# Patient Record
Sex: FEMALE | Race: WHITE | NOT HISPANIC OR LATINO | Employment: UNEMPLOYED | URBAN - METROPOLITAN AREA
[De-identification: names, ages, dates, MRNs, and addresses within clinical notes are randomized per-mention and may not be internally consistent; named-entity substitution may affect disease eponyms.]

---

## 2019-08-23 LAB
HPV ABSTRACT: NORMAL
PAP-ABSTRACT: ABNORMAL

## 2021-03-01 LAB
CHOLESTEROL (EXTERNAL): 238 MG/DL
HDLC SERPL-MCNC: 83 MG/DL
HEP C HIM: NORMAL
HIV 1&2 EXT: NORMAL
LDL CHOLESTEROL CALCULATED (EXTERNAL): 138 MG/DL
NON HDL CHOLESTEROL (EXTERNAL): 155 MG/DL
TRIGLYCERIDES (EXTERNAL): 87 MG/DL

## 2022-06-06 LAB — INR (EXTERNAL): 0.9

## 2022-10-21 ENCOUNTER — TRANSFERRED RECORDS (OUTPATIENT)
Dept: HEALTH INFORMATION MANAGEMENT | Facility: CLINIC | Age: 47
End: 2022-10-21

## 2023-01-31 ENCOUNTER — TRANSFERRED RECORDS (OUTPATIENT)
Dept: HEALTH INFORMATION MANAGEMENT | Facility: CLINIC | Age: 48
End: 2023-01-31

## 2023-02-08 ENCOUNTER — TRANSFERRED RECORDS (OUTPATIENT)
Dept: HEALTH INFORMATION MANAGEMENT | Facility: CLINIC | Age: 48
End: 2023-02-08

## 2023-04-19 ENCOUNTER — TRANSCRIBE ORDERS (OUTPATIENT)
Dept: OTHER | Age: 48
End: 2023-04-19

## 2023-04-19 ENCOUNTER — PATIENT OUTREACH (OUTPATIENT)
Dept: ONCOLOGY | Facility: CLINIC | Age: 48
End: 2023-04-19
Payer: COMMERCIAL

## 2023-04-19 DIAGNOSIS — C54.1 ENDOMETRIAL STROMAL SARCOMA (H): Primary | ICD-10-CM

## 2023-04-19 NOTE — PROGRESS NOTES
"New Patient Hematology / Oncology Nurse Navigator Note     Referral Date: 4/19/23    Referring provider: Self-referred    Referring Clinic/Organization: Self Referred. Currently receiving care at [facility]. Requesting 2nd opinion with Dr. Chowdary      Referred to: GynOnc    Requested provider (if applicable): Dr. 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  No findings of recurrence in the abdomen or pelvis.   2.  A CT chest was performed at the same time which will be reported separately.  IMPRESSION:   1.  No thoracic metastatic disease.   2.  Interval left breast implant rupture.    3/20/23 Office Visit with Oncology LEÓN at  [facility]:  \"Plan will be to see her back in 6 months for exam only. I encouraged her to start walking and doing some lower intensity workouts for her swelling and weight gain, as she was doing cross fit but became nauseous doing cross fit, a may need a little bit lighter intensity work out. I feel the achiness in her right lower abdomen is likely from scar tissue\"-- BOOKMARKED     2/10/20 Path (see report for details)-- BOOKMARKED    Clinical Assessment / Barriers to Care (Per Nurse):  Pt lives in [place], [place]      Records Location: Care Everywhere     Records Needed:   Records from [facility] per protocol    Additional testing needed prior to consult:   N/A    Referral updates and Plan:   OUTGOING CALL to pt:  Introduced my role as nurse navigator with [facility] Hematology/Oncology dept and that we have recd the self-referral to Dr Chowdary.   Pt confirms they are aware of the referral and ready to schedule  Provided contact information if future questions arise.     -Transferred pt to NPS line [phone] to schedule appt per scheduling instructions.    Will review with Dr. Chowdary to confirm appropriate to see pt vs scheduling with sarcoma specialist.     Rosalee " Milton, BSN, RN, PHN, OCN  Hematology/Oncology Nurse Navigator  [facility]  [phone]

## 2023-05-09 NOTE — TELEPHONE ENCOUNTER
Endometrial stromal sarcoma (H) [C54.1]  RECORDS STATUS - ALL OTHER DIAGNOSIS      Action May 9, 2023 3:47 PM TJ   Action Taken Call to PT and she stated that she was originally Dx'd at [facility], had one CT and Consult at [facility] (12/2019) and has had all of her Tx at [facility].  PT stated she has never had chemo or radiation.  PT has no access to a printer, so she is planning to sign an AMOS for [facility] when she comes in on Thursday.  Will plan coordination with check in-IB to Rosalee with this info.    May 11, 2023  The plan for PT to sign AMOS today was unsuccessful.  I call her and she stated that she just bought a printer today and to email her the form.  Her plan is to get this back to me in a couple days after she hooks up the printer.  Form has been emailed to [email or web address]     Records Requested     May 9, 2023 3:50 PM    TJ   Clinic name Comments Request Status Complete/  Received   [facility] All imaging over last 5 years Faxed 5.16.23  2nd Request 5.16.23    [facility] Pathology Case: [id number] Uterus Faxed 5.9.23  Tracking #: [id number]      Imaging Received  May 17, 2023    9:50 AM  TJ   Action: Images from [facility] received and resolved to PACS.  Originally under the name Orquideasanjeev Arevalo     RECORDS RECEIVED FROM: [facility]; [facility]; [facility]   DATE RECEIVED: [facility]   NOTES STATUS DETAILS   OFFICE NOTE from referring provider 4.19.23 [facility] Self-2nd Opinion   OFFICE NOTE from medical oncologist 3.20.23 [facility] Isatu Porras   OFFICE NOTE from other specialist [facility]  7.29.21   [place]:  1.13.20    Noni Stoner, Gyn/Onc      Marcos Toro, Gynecology       OPERATIVE/PROCEDURE REPORT 7.23.21 [facility]/ Colonoscopy   DISCHARGE REPORT from the ER  [facility]  1.1.23; 6.11.22; 6.6.22    MEDICATION LIST [facility]    LABS     PATHOLOGY REPORTS [facility]   12.4.19  Tracking #: [id number]  [facility]:  2.10.20 Re-read, not requested as report in [facility]   Case: [id number] Uterus          Case: JR- Pelvic   ANYTHING RELATED TO  DIAGNOSIS 3.20.23 Most recent labs   IMAGING (NEED IMAGES & REPORT)     CT SCANS [facility]:  3.20.23; 6.11.22; 2.25.22; 3.17.21; 10.2.20; 3.9.20  [facility]:   12.27.19 Report from [place] in [facility] Chest/Abdomen Pelvis       MRI [facility]:  10.21.22   Brain   ULTRASOUND [facility]:  3.24.20 Kidnesy w/Bladder

## 2023-05-10 NOTE — PROGRESS NOTES
Consult Notes on Referred Patient    Date: 2023       Dr. Samantha Anderson MD  No address on file       RE: Orquidea Diggs  : 1975  EVER: 2023    Dear Dr. Referred Self:    I had the pleasure of seeing your patient Orquidea Diggs here at the Gynecologic Cancer Clinic at the [facility] on 2023.  As you know she is a very pleasant 47 year old woman with a recent diagnosis of  ESS.  Given these findings she was subsequently sent to the Gynecologic Cancer Clinic for new patient consultation.     HPI  Ms. Arevalo is a very pleasant 47 y.o. woman with the following oncologic history:    Oncology History   Sarcoma Endometrial Stromal (HCC)   Genetic Testing and Tumor Genotyping   None    Initial Diagnosis   Sarcoma Endometrial Stromal (HCC)    Symptoms began in 2019, right lower quadrant abdominal pain, normal menstrual period. Ultrasound revealed multiple fibroids. Managed with Lupron considered but patient declined.    2019 Surgery and Procedures with OB/GYN  total laparoscopic hysterectomy, bilateral salpingectomy with only right oophorectomy. A presumed fibroid mass wrapped around a ligament. Left ovary appeared normal. Pathology reviewed at [facility] shows low-grade endometrial stromal sarcoma arising in the uterus. [facility] did not see it involving the fallopian tube but such was recorded/reported.    ([facility] Path: 14.5 cm LGESS extending into 1 FT; LVSI +)    2019, CT chest abdomen pelvis shows right and left pelvic masses possibly representing metastatic disease. Chest imaging was not definitive for metastases with some enlarged but not significantly enlarged nodes:  Postoperative changes of hysterectomy, bilateral salpingectomy, and right  oophorectomy.     Within the right adnexal region there is a 3.9 x 3.3 cm hyperenhancing soft  tissue mass concerning for metastatic disease or local recurrence. Expansive  filling defect within the  right internal iliac vein that extends cranially to  the level of the mid right common iliac vein (series 4, image 166; series 605,  image 65). The density is similar to the right adnexal mass, which is concerning  for tumor thrombus. No evidence of DVT in the right external iliac or common  femoral veins.    Within the left adnexa there is a 7.1 x 3.7 cm loculated cystic lesion.     Multiple tiny hypodense hepatic lesions most likely represent benign  hemangiomas/cysts, but are indeterminate for metastases. The gallbladder,  pancreas, adrenal glands, and kidneys are normal. Small splenules. No  lymphadenopathy in the abdomen or pelvis. Normal caliber small and large bowel.  No aggressive osseous lesions.    1/17/2020 Tumor Board   Path review: LGESS arising from the myometrium perhaps extending to uterine cornua but no evidence of the slides submitted that there is any disease on a fallopian tube or ovary  Rad review: Some LGESS may have FDG update on PET; mediastinal node is suspicious   Biopsy of the mediastinal node and if positive, consider resection vs. adjuvant treatment (hormone therapy) after surgery  Return to the OR for resection of residual disease and LSO    2/10/2020 Surgery and Procedures:02/10/2020 EXPLORATORY LAPAROTOMY., LEFT OOPHORECTOMY, RESECTION PELVIC MASS., UPPER VAGINECTOMY, CYSTOSCOPY, INSERTION STENT URETER., Resection of right internal iliac vein with intravenous tumor. Repair of right common iliac vein., Lymphadenectomy     Surgery  Exlap, resection of pelvic disease remaining from original surgery elsewhere. Resection of tumor mass within the right pelvic ileac vein system.   Complete gross resection.   Stage IIB    3/7/2020 - Biological/Targeted/Hormone Therapy   Letrozole 2.5 daily. At first month, nausea is minimal. Hot flashes are ok. Working from home and staying active. 2-3 mi walking 5 days. CrossFit 40 min three times weekly. Baseline bone mineral density is good, no  osteopenia.    3/2023: Letrozole stopped due to side effects     3/20/23: CT CAP    COMPARISON:  Multiple priors, most recently CT 06/11/2022     FINDINGS:     Prior hysterectomy, bilateral salpingo-oophorectomy, right iliac vein resection, and partial   vaginectomy for endometrial stromal sarcoma. Postsurgical changes and surgical clips in the pelvis.     There is tethering of the right distal ureter in the pelvis with mild associated urothelial   thickening and enhancement, for example series 1, image 111. Mild upstream hydroureter without   hydronephrosis. The contralateral ureter is normal. The bladder is within expected limits. No   recurrent soft tissue in the pelvis or along the iliac veins. No new adenopathy or free fluid.     Multiple hypoattenuating lesions in the liver are stable dating back to 01/22/2020 and likely   represent benign cysts. No suspicious hepatic masses. Non-cirrhotic morphology of the liver with   patent portal and hepatic veins.     The gallbladder and biliary tree are normal. No suspicious pancreatic masses. The adrenal glands and   kidneys are normal. Normal spleen with 2 small splenules. The bowel is within expected limits.     Tiny fat-containing umbilical hernia. No aggressive osseous lesions.     A CT chest was performed at the same time which will be reported separately.     IMPRESSION:   1.  No findings of recurrence in the abdomen or pelvis.   2.  A CT chest was performed at the same time which will be reported separately.    CT chest:  FINDINGS:   This examination was performed in conjunction with a CT of the abdomen, which will be reported   separately.     No suspicious lung nodule. No bulky thoracic lymphadenopathy. No suspicious osseous lesion. No   pleural effusion. Interval left breast implant rupture. Right breast implant appears similar since   prior exam.     IMPRESSION:   1.  No thoracic metastatic disease.   2.  Interval left breast implant rupture.        Today:  Feels better off Letrozole, no more joint pain and was swollen in fingers and her feet-completely improved. Came off the Effexor which she was taking for hot flashes. Still having hot flashes now but they were not better on the Effexor. Has hot flashes constantly: sweating or freezing. Has had TSH checked in past but normal. Had a consult with holistic NP-feels terrible mentally and physically. Was on 75 mg Effexor weaned off and was terrible for her. Has inner ear issues. Has a hx of anxiety been managing with ativan prn. Has a primary MD who manages that. Has not been pleased with her care team. Attended survivorship conference here which is why she is here. Was previously on anti-depressant : lexapro but hated it and stopped it. Just met with a mental health coordinator with [employer].Was laid off in Feb. Has adult children at home (2 living at home age 32 and 23) causes her anxiety. Hoping to hear from outpatient clinic today. Has back pain some nausea and vertigo and dizziness. Feels like everything 3 x more difficult than it should be. Had some pain in right groin when bends and pressure. Naproxen gives her heart burn but helped the discomfort. Has diarrhea and constipation. Saw GI doctor at [place]. 1 year ago had blood in urine and had cystoscopy June 2022 and Sept 2022-told UTI, took antibiotics.    Review of Systems:    I have reviewed the pertinent positive findings in the review of symptoms with the patient.    Past Medical History:     IBS: diarrhea and constipation  anxiety    Past Surgical History:  See above    Health Maintenance:  Health Maintenance Due   Topic Date Due     YEARLY PREVENTIVE VISIT  Never done     ADVANCE CARE PLANNING  Never done     MAMMO SCREENING  Never done     HEPATITIS B IMMUNIZATION (1 of 3 - 3-dose series) Never done     COLORECTAL CANCER SCREENING  Never done     HIV SCREENING  Never done     HEPATITIS C SCREENING  Never done     PAP  Never done     LIPID  Never done     COVID-19  "Vaccine (3 - Booster for Pfizer series) 12/22/2021     INFLUENZA VACCINE (1) Never done     DTAP/TDAP/TD IMMUNIZATION (2 - Td or Tdap) 10/31/2022     PHQ-2 (once per calendar year)  Never done          She has had a history of abnormal Pap smears.  ASCUS    Last Mammogram: 2022             Result: normal      She has not had a history of abnormal mammograms.    Last Colonoscopy: 2021              Result: abnormal: polyps repeat in 5 years.     TSH 3.09 10/2020         Current Medications:     has a current medication list which includes the following prescription(s): buspirone, lorazepam, meclizine, melatonin, and ondansetron.       Allergies:     Ciprofloxacin: dizzy       Social History:     Social History     Tobacco Use     Smoking status: Not on file     Smokeless tobacco: Not on file   Substance Use Topics     Alcohol use: Not on file       History   Drug Use Not on file       No tobacco, no drugs, occ ETOH    Family History:     The patient's family history is notable for the following:    No breast, colon or ovarian cancer    Physical Exam:     /85 (BP Location: Right arm, Patient Position: Sitting, Cuff Size: Adult Large)   Pulse 67   Temp 97.9  F (36.6  C) (Oral)   Resp 18   Ht 1.644 m (5' 4.72\")   Wt 80 kg (176 lb 6.4 oz)   SpO2 96%   BMI 29.61 kg/m    Body mass index is 29.61 kg/m .    General Appearance: healthy and alert, appears anxious, sweaty with hot flashes     HEENT:   , no palpable nodules or masses        Cardiovascular: regular rate and rhythm, no gallops, rubs or murmurs     Respiratory: lungs clear, no rales, rhonchi or wheezes, normal diaphragmatic excursion    Musculoskeletal: extremities non tender and without edema    Neurological: normal gait, no gross defects     Psychiatric: appropriate mood and affect                               Hematological: normal cervical, supraclavicular and inguinal lymph nodes     Gastrointestinal:       abdomen soft, non-tender, non-distended, " no organomegaly or masses, vertical midline incision well healed. Palpation of right lower quadrant can reproduce pain with palpation to medial aspect of Ant superior iliac spine.    Genitourinary: External genitalia and urethral meatus appears normal.  Vagina is smooth without nodularity or masses.  Cervix absent.  Bimanual exam reveal no masses, nodularity or fullness.         Assessment:    Orquidea Diggs is a 47 year old woman with a diagnosis of ESS with history of letrozole x 3 years. Recently stopped in 3/2023 due to menopausal side effects       Plan:     1.)    ESS: Recently stopped letrozole. Anxiety issues and hot flashes, discussed celexa or restarting Effexor. Patient would benefit from NP survivorship visit and Dr. Dobson consult. Based on imaging [facility]. I reviewed these today with the patient.  -Recommend q 6 mos visits and yearly CTCAP  -Right groin discomfort: encouraged 600 mg ibuprofen x 3 days with foot to see if improvement. Suspect muscle strain discomfort. Encourage PT for pain management if persists.  -NP for survivorship visit   -Primary care MD to set up with   -DXA scan for bone density  -Mental health: encouraged patient to follow up with local [facility] health clinic for counseling and possibly for initiation of anti-depressants. Patient admits she has done this and will follow up with them.   -RTC in 6 months with me.     2.) Genetic risk factors were assessed and the patient does not meet the qualifications for a referral.      3.) Labs and/or tests ordered include:  None today     4.) Health maintenance issues addressed today include colonoscopy        Thank you for allowing us to participate in the care of your patient.         Sincerely,    Sheba Chowdary MD    Department of Ob/Gyn and Women's Health  Division of Gynecologic Oncology  [facility]  [phone]      CC  Patient Care Team:  Katlin Garcia MD as PCP - General Chowdary,  Sheba Laird MD as MD (Gynecologic Oncology)  SELF, REFERRED

## 2023-05-11 ENCOUNTER — PRE VISIT (OUTPATIENT)
Dept: ONCOLOGY | Facility: CLINIC | Age: 48
End: 2023-05-11
Payer: COMMERCIAL

## 2023-05-11 ENCOUNTER — PATIENT OUTREACH (OUTPATIENT)
Dept: ONCOLOGY | Facility: CLINIC | Age: 48
End: 2023-05-11
Payer: COMMERCIAL

## 2023-05-11 ENCOUNTER — ONCOLOGY VISIT (OUTPATIENT)
Dept: ONCOLOGY | Facility: CLINIC | Age: 48
End: 2023-05-11
Attending: OBSTETRICS & GYNECOLOGY
Payer: COMMERCIAL

## 2023-05-11 VITALS
WEIGHT: 176.4 LBS | HEIGHT: 65 IN | DIASTOLIC BLOOD PRESSURE: 85 MMHG | SYSTOLIC BLOOD PRESSURE: 125 MMHG | HEART RATE: 67 BPM | OXYGEN SATURATION: 96 % | TEMPERATURE: 97.9 F | BODY MASS INDEX: 29.39 KG/M2 | RESPIRATION RATE: 18 BRPM

## 2023-05-11 DIAGNOSIS — C54.1 ENDOMETRIAL CANCER (H): Primary | ICD-10-CM

## 2023-05-11 DIAGNOSIS — M89.8X9 CANCER TREATMENT-INDUCED BONE LOSS: ICD-10-CM

## 2023-05-11 DIAGNOSIS — Z76.89 ENCOUNTER TO ESTABLISH CARE: ICD-10-CM

## 2023-05-11 DIAGNOSIS — T45.1X5A CANCER TREATMENT-INDUCED BONE LOSS: ICD-10-CM

## 2023-05-11 PROCEDURE — G0463 HOSPITAL OUTPT CLINIC VISIT: HCPCS | Performed by: OBSTETRICS & GYNECOLOGY

## 2023-05-11 PROCEDURE — 99203 OFFICE O/P NEW LOW 30 MIN: CPT | Performed by: OBSTETRICS & GYNECOLOGY

## 2023-05-11 RX ORDER — ONDANSETRON 4 MG/1
4 TABLET, FILM COATED ORAL EVERY 8 HOURS PRN
COMMUNITY
Start: 2023-05-09 | End: 2024-02-19

## 2023-05-11 RX ORDER — LORAZEPAM 0.5 MG/1
0.5 TABLET ORAL PRN
COMMUNITY
Start: 2022-09-20

## 2023-05-11 RX ORDER — BUSPIRONE HYDROCHLORIDE 7.5 MG/1
7.5 TABLET ORAL
COMMUNITY
Start: 2023-05-09 | End: 2023-10-12

## 2023-05-11 RX ORDER — PHENOL 1.4 %
10 AEROSOL, SPRAY (ML) MUCOUS MEMBRANE
COMMUNITY
End: 2023-10-12

## 2023-05-11 RX ORDER — MECLIZINE HYDROCHLORIDE 25 MG/1
25 TABLET ORAL
COMMUNITY
Start: 2023-01-01 | End: 2023-10-12

## 2023-05-11 ASSESSMENT — PAIN SCALES - GENERAL: PAINLEVEL: NO PAIN (0)

## 2023-05-11 NOTE — PROGRESS NOTES
received referral for Dr Dobson in the PM&R clinic.     Referred for: hx endometrial cancer deconditioned    Scheduling instructions updated and sent to New Patient Scheduling for completion.

## 2023-05-11 NOTE — LETTER
2023         RE: Orquidea Diggs  [address] [address]        Dear Colleague,    Thank you for referring your patient, Orquidea Diggs, to the [facility]. Please see a copy of my visit note below.                            Consult Notes on Referred Patient    Date: 2023       Dr. Samantha Anderson MD  No address on file       RE: Orquidea Diggs  : 1975  EVER: 2023    Dear Dr. Referred Self:    I had the pleasure of seeing your patient Orquidea Diggs here at the Gynecologic Cancer Clinic at the [facility] on 2023.  As you know she is a very pleasant 47 year old woman with a recent diagnosis of  ESS.  Given these findings she was subsequently sent to the Gynecologic Cancer Clinic for new patient consultation.     HPI  Ms. Arevalo is a very pleasant 47 y.o. woman with the following oncologic history:    Oncology History   Sarcoma Endometrial Stromal (HCC)   Genetic Testing and Tumor Genotyping   None    Initial Diagnosis   Sarcoma Endometrial Stromal (HCC)    Symptoms began in 2019, right lower quadrant abdominal pain, normal menstrual period. Ultrasound revealed multiple fibroids. Managed with Lupron considered but patient declined.    2019 Surgery and Procedures with OB/GYN  total laparoscopic hysterectomy, bilateral salpingectomy with only right oophorectomy. A presumed fibroid mass wrapped around a ligament. Left ovary appeared normal. 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 [facility] did not see it involving the fallopian tube but such was recorded/reported.    ([facility]: 14.5 cm LGESS extending into 1 FT; LVSI +)    2019, CT chest abdomen pelvis shows right and left pelvic masses possibly representing metastatic disease. Chest imaging was not definitive for metastases with some enlarged but not significantly enlarged  nodes:  Postoperative changes of hysterectomy, bilateral salpingectomy, and right  oophorectomy.     Within the right adnexal region there is a 3.9 x 3.3 cm hyperenhancing soft  tissue mass concerning for metastatic disease or local recurrence. Expansive  filling defect within the right internal iliac vein that extends cranially to  the level of the mid right common iliac vein (series 4, image 166; series 605,  image 65). The density is similar to the right adnexal mass, which is concerning  for tumor thrombus. No evidence of DVT in the right external iliac or common  femoral veins.    Within the left adnexa there is a 7.1 x 3.7 cm loculated cystic lesion.     Multiple tiny hypodense hepatic lesions most likely represent benign  hemangiomas/cysts, but are indeterminate for metastases. The gallbladder,  pancreas, adrenal glands, and kidneys are normal. Small splenules. No  lymphadenopathy in the abdomen or pelvis. Normal caliber small and large bowel.  No aggressive osseous lesions.    1/17/2020 Tumor Board   Path review: LGESS arising from the myometrium perhaps extending to uterine cornua but no evidence of the slides submitted that there is any disease on a fallopian tube or ovary  Rad review: Some LGESS may have FDG update on PET; mediastinal node is suspicious   Biopsy of the mediastinal node and if positive, consider resection vs. adjuvant treatment (hormone therapy) after surgery  Return to the OR for resection of residual disease and LSO    2/10/2020 Surgery and Procedures:02/10/2020 EXPLORATORY LAPAROTOMY., LEFT OOPHORECTOMY, RESECTION PELVIC MASS., UPPER VAGINECTOMY, CYSTOSCOPY, INSERTION STENT URETER., Resection of right internal iliac vein with intravenous tumor. Repair of right common iliac vein., Lymphadenectomy     Surgery  Exlap, resection of pelvic disease remaining from original surgery elsewhere. Resection of tumor mass within the right pelvic ileac vein system.   Complete gross resection.   Stage  IIB    3/7/2020 - Biological/Targeted/Hormone Therapy   Letrozole 2.5 daily. At first month, nausea is minimal. Hot flashes are ok. Working from home and staying active. 2-3 mi walking 5 days. CrossFit 40 min three times weekly. Baseline bone mineral density is good, no osteopenia.    3/2023: Letrozole stopped due to side effects     3/20/23: CT CAP    COMPARISON:  Multiple priors, most recently CT 06/11/2022     FINDINGS:     Prior hysterectomy, bilateral salpingo-oophorectomy, right iliac vein resection, and partial   vaginectomy for endometrial stromal sarcoma. Postsurgical changes and surgical clips in the pelvis.     There is tethering of the right distal ureter in the pelvis with mild associated urothelial   thickening and enhancement, for example series 1, image 111. Mild upstream hydroureter without   hydronephrosis. The contralateral ureter is normal. The bladder is within expected limits. No   recurrent soft tissue in the pelvis or along the iliac veins. No new adenopathy or free fluid.     Multiple hypoattenuating lesions in the liver are stable dating back to 01/22/2020 and likely   represent benign cysts. No suspicious hepatic masses. Non-cirrhotic morphology of the liver with   patent portal and hepatic veins.     The gallbladder and biliary tree are normal. No suspicious pancreatic masses. The adrenal glands and   kidneys are normal. Normal spleen with 2 small splenules. The bowel is within expected limits.     Tiny fat-containing umbilical hernia. No aggressive osseous lesions.     A CT chest was performed at the same time which will be reported separately.     IMPRESSION:   1.  No findings of recurrence in the abdomen or pelvis.   2.  A CT chest was performed at the same time which will be reported separately.    CT chest:  FINDINGS:   This examination was performed in conjunction with a CT of the abdomen, which will be reported   separately.     No suspicious lung nodule. No bulky thoracic  lymphadenopathy. No suspicious osseous lesion. No   pleural effusion. Interval left breast implant rupture. Right breast implant appears similar since   prior exam.     IMPRESSION:   1.  No thoracic metastatic disease.   2.  Interval left breast implant rupture.        Today: Feels better off Letrozole, no more joint pain and was swollen in fingers and her feet-completely improved. Came off the Effexor which she was taking for hot flashes. Still having hot flashes now but they were not better on the Effexor. Has hot flashes constantly: sweating or freezing. Has had TSH checked in past but normal. Had a consult with holistic NP-feels terrible mentally and physically. Was on 75 mg Effexor weaned off and was terrible for her. Has inner ear issues. Has a hx of anxiety been managing with ativan prn. Has a primary MD who manages that. Has not been pleased with her care team. Attended survivorship conference here which is why she is here. Was previously on anti-depressant : lexapro but hated it and stopped it. Just met with a mental health coordinator with [employer].Was laid off in Feb. Has adult children at home (2 living at home age 32 and 23) causes her anxiety. Hoping to hear from outpatient clinic today. Has back pain some nausea and vertigo and dizziness. Feels like everything 3 x more difficult than it should be. Had some pain in right groin when bends and pressure. Naproxen gives her heart burn but helped the discomfort. Has diarrhea and constipation. Saw GI doctor at [facility]. 1 year ago had blood in urine and had cystoscopy June 2022 and Sept 2022-told UTI, took antibiotics.    Review of Systems:    I have reviewed the pertinent positive findings in the review of symptoms with the patient.    Past Medical History:     IBS: diarrhea and constipation  anxiety    Past Surgical History:  See above    Health Maintenance:  Health Maintenance Due   Topic Date Due    YEARLY PREVENTIVE VISIT  Never done    ADVANCE CARE PLANNING   "Never done    MAMMO SCREENING  Never done    HEPATITIS B IMMUNIZATION (1 of 3 - 3-dose series) Never done    COLORECTAL CANCER SCREENING  Never done    HIV SCREENING  Never done    HEPATITIS C SCREENING  Never done    PAP  Never done    LIPID  Never done    COVID-19 Vaccine (3 - Booster for Pfizer series) 12/22/2021    INFLUENZA VACCINE (1) Never done    DTAP/TDAP/TD IMMUNIZATION (2 - Td or Tdap) 10/31/2022    PHQ-2 (once per calendar year)  Never done          She has had a history of abnormal Pap smears.  ASCUS    Last Mammogram: 2022             Result: normal      She has not had a history of abnormal mammograms.    Last Colonoscopy: 2021              Result: abnormal: polyps repeat in 5 years.     TSH 3.09 10/2020         Current Medications:     has a current medication list which includes the following prescription(s): buspirone, lorazepam, meclizine, melatonin, and ondansetron.       Allergies:     Ciprofloxacin: dizzy       Social History:     Social History     Tobacco Use    Smoking status: Not on file    Smokeless tobacco: Not on file   Substance Use Topics    Alcohol use: Not on file       History   Drug Use Not on file       No tobacco, no drugs, occ ETOH    Family History:     The patient's family history is notable for the following:    No breast, colon or ovarian cancer    Physical Exam:     /85 (BP Location: Right arm, Patient Position: Sitting, Cuff Size: Adult Large)   Pulse 67   Temp 97.9  F (36.6  C) (Oral)   Resp 18   Ht 1.644 m (5' 4.72\")   Wt 80 kg (176 lb 6.4 oz)   SpO2 96%   BMI 29.61 kg/m    Body mass index is 29.61 kg/m .    General Appearance: healthy and alert, appears anxious, sweaty with hot flashes     HEENT:   , no palpable nodules or masses        Cardiovascular: regular rate and rhythm, no gallops, rubs or murmurs     Respiratory: lungs clear, no rales, rhonchi or wheezes, normal diaphragmatic excursion    Musculoskeletal: extremities non tender and without " edema    Neurological: normal gait, no gross defects     Psychiatric: appropriate mood and affect                               Hematological: normal cervical, supraclavicular and inguinal lymph nodes     Gastrointestinal:       abdomen soft, non-tender, non-distended, no organomegaly or masses, vertical midline incision well healed. Palpation of right lower quadrant can reproduce pain with palpation to medial aspect of Ant superior iliac spine.    Genitourinary: External genitalia and urethral meatus appears normal.  Vagina is smooth without nodularity or masses.  Cervix absent.  Bimanual exam reveal no masses, nodularity or fullness.         Assessment:    Orquidea Diggs is a 47 year old woman with a diagnosis of ESS with history of letrozole x 3 years. Recently stopped in 3/2023 due to menopausal side effects       Plan:     1.)    ESS: Recently stopped letrozole. Anxiety issues and hot flashes, discussed celexa or restarting Effexor. Patient would benefit from NP survivorship visit and Dr. Dobson consult. Based on imaging JOSR. I reviewed these today with the patient.  -Recommend q 6 mos visits and yearly CTCAP  -Right groin discomfort: encouraged 600 mg ibuprofen x 3 days with foot to see if improvement. Suspect muscle strain discomfort. Encourage PT for pain management if persists.  -NP for survivorship visit   -Primary care MD to set up with   -DXA scan for bone density  -Mental health: encouraged patient to follow up with local [facility] mental health clinic for counseling and possibly for initiation of anti-depressants. Patient admits she has done this and will follow up with them.   -RTC in 6 months with me.     2.) Genetic risk factors were assessed and the patient does not meet the qualifications for a referral.      3.) Labs and/or tests ordered include:  None today     4.) Health maintenance issues addressed today include colonoscopy        Thank you for allowing us to participate in the care of your  patient.         Sincerely,    Sheba Chowdary MD    Department of Ob/Gyn and Women's Health  Division of Gynecologic Oncology  [facility]  [phone]      CC  Patient Care Team:  Katlin Garcia MD as PCP - Sheba Hutchinson MD as MD (Gynecologic Oncology)  SELF, REFERRED

## 2023-05-11 NOTE — TELEPHONE ENCOUNTER
Slides from [facility]/ [facility] received 5/11/23 and were sent to 5th floor path lab.

## 2023-05-11 NOTE — NURSING NOTE
"Oncology Rooming Note    May 11, 2023 8:20 AM   Orquidea Diggs is a 47 year old female who presents for:    Chief Complaint   Patient presents with     Oncology Clinic Visit     NEW - ENDOMETRIAL STROMAL SARCOMA     Initial Vitals: /85 (BP Location: Right arm, Patient Position: Sitting, Cuff Size: Adult Large)   Pulse 67   Temp 97.9  F (36.6  C) (Oral)   Resp 18   Ht 1.644 m (5' 4.72\")   Wt 80 kg (176 lb 6.4 oz)   SpO2 96%   BMI 29.61 kg/m   Estimated body mass index is 29.61 kg/m  as calculated from the following:    Height as of this encounter: 1.644 m (5' 4.72\").    Weight as of this encounter: 80 kg (176 lb 6.4 oz). Body surface area is 1.91 meters squared.  No Pain (0) Comment: Data Unavailable   No LMP recorded. Patient has had a hysterectomy.  Allergies reviewed: Yes  Medications reviewed: Yes    Medications: Medication refills not needed today.  Pharmacy name entered into "[employer]: [id number] - [place], [place] - [address]    Clinical concerns: New patient.        Suzan Hanks CMA              "

## 2023-05-12 ENCOUNTER — LAB REQUISITION (OUTPATIENT)
Dept: LAB | Facility: CLINIC | Age: 48
End: 2023-05-12
Payer: COMMERCIAL

## 2023-05-12 PROCEDURE — 88321 CONSLTJ&REPRT SLD PREP ELSWR: CPT | Mod: GC | Performed by: PATHOLOGY

## 2023-05-12 NOTE — NURSING NOTE
Return appt in  6 months  Radiology for dexa scan now, CT scan in 1 year,  orders entered, to be scheduled  Referral to cancer rehab, primary care, surviviorship

## 2023-05-12 NOTE — PATIENT INSTRUCTIONS
It was a pleasure seeing you in clinic today-please reach out if there are any further questions that arise following today's visit.  During business hours, you may reach me at [phone].  For urgent/emergent questions after business hours, you may reach the on-call Gynecologic Oncology Resident through the [facility]  at [phone].    All normal test results are usually communicated via letter or [employer] message.  Abnormal results (those that require a change in the previously discussed plan of care) are usually communicated via a phone call.    I recommend signing up for [employer] access if you have not already done so.  This allows you online access to your lab results and also helps you communicate efficiently with your clinic should any questions arise in your care.    Follow up appointment in 6 months with MD scheduling will call you to help make an appointment  referral to primary care, radiology for dexa scan cancer rehab, surviviorship with NP, scheduling will call you to help make an appointment    Dr Epi Goldberg RN  Phone:  [phone]  Fax:  [phone]

## 2023-05-23 ENCOUNTER — TRANSFERRED RECORDS (OUTPATIENT)
Dept: MULTI SPECIALTY CLINIC | Facility: CLINIC | Age: 48
End: 2023-05-23
Payer: COMMERCIAL

## 2023-05-23 LAB
ALT SERPL-CCNC: 64 IU/L (ref 8–45)
AST SERPL-CCNC: 46 IU/L (ref 2–40)
CREATININE (EXTERNAL): 1.04 MG/DL (ref 0.57–1.11)
GFR ESTIMATED (EXTERNAL): 66 ML/MIN/1.73M2
GLUCOSE (EXTERNAL): 87 MG/DL (ref 70–99)
POTASSIUM (EXTERNAL): 4.2 MMOL/L (ref 3.5–5)
TSH SERPL-ACNC: 1.7 UIU/ML (ref 0.35–4.94)

## 2023-05-30 LAB
PATH REPORT.COMMENTS IMP SPEC: ABNORMAL
PATH REPORT.COMMENTS IMP SPEC: YES
PATH REPORT.FINAL DX SPEC: ABNORMAL
PATH REPORT.GROSS SPEC: ABNORMAL
PATH REPORT.MICROSCOPIC SPEC OTHER STN: ABNORMAL
PATH REPORT.MICROSCOPIC SPEC OTHER STN: ABNORMAL
PATH REPORT.RELEVANT HX SPEC: ABNORMAL
PATH REPORT.RELEVANT HX SPEC: ABNORMAL
PATH REPORT.SITE OF ORIGIN SPEC: ABNORMAL

## 2023-05-31 PROBLEM — C54.1 ENDOMETRIAL STROMAL SARCOMA (H): Status: ACTIVE | Noted: 2020-01-13

## 2023-05-31 PROBLEM — K92.1 BLOOD IN STOOL: Status: ACTIVE | Noted: 2021-03-17

## 2023-05-31 PROBLEM — I82.409 DEEP VEIN THROMBOSIS (DVT) OF LOWER EXTREMITY (H): Status: ACTIVE | Noted: 2020-01-24

## 2023-05-31 PROBLEM — N13.30 HYDRONEPHROSIS: Status: ACTIVE | Noted: 2020-03-26

## 2023-05-31 PROBLEM — R59.1 LYMPHADENOPATHY: Status: ACTIVE | Noted: 2020-01-20

## 2023-05-31 PROBLEM — R87.610 ATYPICAL SQUAMOUS CELLS OF UNDETERMINED SIGNIFICANCE (ASCUS) ON PAPANICOLAOU SMEAR OF CERVIX: Status: ACTIVE | Noted: 2019-10-23

## 2023-06-02 ENCOUNTER — HEALTH MAINTENANCE LETTER (OUTPATIENT)
Age: 48
End: 2023-06-02

## 2023-06-14 ENCOUNTER — TRANSFERRED RECORDS (OUTPATIENT)
Dept: HEALTH INFORMATION MANAGEMENT | Facility: CLINIC | Age: 48
End: 2023-06-14

## 2023-09-22 ASSESSMENT — ANXIETY QUESTIONNAIRES
5. BEING SO RESTLESS THAT IT IS HARD TO SIT STILL: NOT AT ALL
1. FEELING NERVOUS, ANXIOUS, OR ON EDGE: SEVERAL DAYS
GAD7 TOTAL SCORE: 6
7. FEELING AFRAID AS IF SOMETHING AWFUL MIGHT HAPPEN: SEVERAL DAYS
2. NOT BEING ABLE TO STOP OR CONTROL WORRYING: SEVERAL DAYS
6. BECOMING EASILY ANNOYED OR IRRITABLE: SEVERAL DAYS
3. WORRYING TOO MUCH ABOUT DIFFERENT THINGS: SEVERAL DAYS
IF YOU CHECKED OFF ANY PROBLEMS ON THIS QUESTIONNAIRE, HOW DIFFICULT HAVE THESE PROBLEMS MADE IT FOR YOU TO DO YOUR WORK, TAKE CARE OF THINGS AT HOME, OR GET ALONG WITH OTHER PEOPLE: SOMEWHAT DIFFICULT
4. TROUBLE RELAXING: SEVERAL DAYS

## 2023-09-22 ASSESSMENT — PATIENT HEALTH QUESTIONNAIRE - PHQ9
SUM OF ALL RESPONSES TO PHQ QUESTIONS 1-9: 13
10. IF YOU CHECKED OFF ANY PROBLEMS, HOW DIFFICULT HAVE THESE PROBLEMS MADE IT FOR YOU TO DO YOUR WORK, TAKE CARE OF THINGS AT HOME, OR GET ALONG WITH OTHER PEOPLE: SOMEWHAT DIFFICULT
SUM OF ALL RESPONSES TO PHQ QUESTIONS 1-9: 13

## 2023-10-12 ENCOUNTER — OFFICE VISIT (OUTPATIENT)
Dept: FAMILY MEDICINE | Facility: CLINIC | Age: 48
End: 2023-10-12
Attending: OBSTETRICS & GYNECOLOGY
Payer: COMMERCIAL

## 2023-10-12 VITALS
RESPIRATION RATE: 20 BRPM | HEART RATE: 58 BPM | DIASTOLIC BLOOD PRESSURE: 76 MMHG | BODY MASS INDEX: 29.99 KG/M2 | HEIGHT: 65 IN | SYSTOLIC BLOOD PRESSURE: 118 MMHG | WEIGHT: 180 LBS | TEMPERATURE: 98 F | OXYGEN SATURATION: 96 %

## 2023-10-12 DIAGNOSIS — G89.29 CHRONIC BILATERAL LOW BACK PAIN WITHOUT SCIATICA: ICD-10-CM

## 2023-10-12 DIAGNOSIS — H92.03 OTALGIA OF BOTH EARS: ICD-10-CM

## 2023-10-12 DIAGNOSIS — M54.50 CHRONIC BILATERAL LOW BACK PAIN WITHOUT SCIATICA: ICD-10-CM

## 2023-10-12 DIAGNOSIS — Z76.89 ENCOUNTER TO ESTABLISH CARE: ICD-10-CM

## 2023-10-12 DIAGNOSIS — R42 VERTIGO: ICD-10-CM

## 2023-10-12 DIAGNOSIS — M25.50 MULTIPLE JOINT PAIN: Primary | ICD-10-CM

## 2023-10-12 DIAGNOSIS — R39.81 FUNCTIONAL URINARY INCONTINENCE: ICD-10-CM

## 2023-10-12 PROBLEM — K92.1 BLOOD IN STOOL: Status: RESOLVED | Noted: 2021-03-17 | Resolved: 2023-10-12

## 2023-10-12 LAB
ALBUMIN SERPL BCG-MCNC: 4.6 G/DL (ref 3.5–5.2)
ALP SERPL-CCNC: 89 U/L (ref 35–104)
ALT SERPL W P-5'-P-CCNC: 65 U/L (ref 0–50)
ANION GAP SERPL CALCULATED.3IONS-SCNC: 13 MMOL/L (ref 7–15)
AST SERPL W P-5'-P-CCNC: 50 U/L (ref 0–45)
BILIRUB SERPL-MCNC: 0.5 MG/DL
BUN SERPL-MCNC: 14.6 MG/DL (ref 6–20)
CALCIUM SERPL-MCNC: 9.8 MG/DL (ref 8.6–10)
CHLORIDE SERPL-SCNC: 102 MMOL/L (ref 98–107)
CREAT SERPL-MCNC: 1 MG/DL (ref 0.51–0.95)
CRP SERPL-MCNC: <3 MG/L
DEPRECATED HCO3 PLAS-SCNC: 26 MMOL/L (ref 22–29)
EGFRCR SERPLBLD CKD-EPI 2021: 69 ML/MIN/1.73M2
ERYTHROCYTE [SEDIMENTATION RATE] IN BLOOD BY WESTERGREN METHOD: 8 MM/HR (ref 0–20)
GLUCOSE SERPL-MCNC: 88 MG/DL (ref 70–99)
POTASSIUM SERPL-SCNC: 4.8 MMOL/L (ref 3.4–5.3)
PROT SERPL-MCNC: 7.6 G/DL (ref 6.4–8.3)
SODIUM SERPL-SCNC: 141 MMOL/L (ref 135–145)
VIT D+METAB SERPL-MCNC: 41 NG/ML (ref 20–50)

## 2023-10-12 PROCEDURE — 86200 CCP ANTIBODY: CPT | Performed by: FAMILY MEDICINE

## 2023-10-12 PROCEDURE — 80053 COMPREHEN METABOLIC PANEL: CPT | Performed by: FAMILY MEDICINE

## 2023-10-12 PROCEDURE — 99204 OFFICE O/P NEW MOD 45 MIN: CPT | Mod: 25 | Performed by: FAMILY MEDICINE

## 2023-10-12 PROCEDURE — 85652 RBC SED RATE AUTOMATED: CPT | Performed by: FAMILY MEDICINE

## 2023-10-12 PROCEDURE — 86140 C-REACTIVE PROTEIN: CPT | Performed by: FAMILY MEDICINE

## 2023-10-12 PROCEDURE — 90471 IMMUNIZATION ADMIN: CPT | Performed by: FAMILY MEDICINE

## 2023-10-12 PROCEDURE — 36415 COLL VENOUS BLD VENIPUNCTURE: CPT | Performed by: FAMILY MEDICINE

## 2023-10-12 PROCEDURE — 82306 VITAMIN D 25 HYDROXY: CPT | Performed by: FAMILY MEDICINE

## 2023-10-12 PROCEDURE — 86431 RHEUMATOID FACTOR QUANT: CPT | Performed by: FAMILY MEDICINE

## 2023-10-12 PROCEDURE — 90715 TDAP VACCINE 7 YRS/> IM: CPT | Performed by: FAMILY MEDICINE

## 2023-10-12 RX ORDER — MECLIZINE HYDROCHLORIDE 25 MG/1
25 TABLET ORAL 3 TIMES DAILY PRN
Qty: 20 TABLET | Refills: 1 | Status: SHIPPED | OUTPATIENT
Start: 2023-10-12 | End: 2024-02-19

## 2023-10-12 RX ORDER — BUPROPION HYDROCHLORIDE 150 MG/1
150 TABLET ORAL EVERY MORNING
COMMUNITY
Start: 2023-10-03 | End: 2024-04-25

## 2023-10-12 RX ORDER — CYCLOBENZAPRINE HCL 10 MG
10 TABLET ORAL
Qty: 30 TABLET | Refills: 3 | Status: SHIPPED | OUTPATIENT
Start: 2023-10-12 | End: 2024-02-19

## 2023-10-12 ASSESSMENT — ANXIETY QUESTIONNAIRES
5. BEING SO RESTLESS THAT IT IS HARD TO SIT STILL: NOT AT ALL
1. FEELING NERVOUS, ANXIOUS, OR ON EDGE: MORE THAN HALF THE DAYS
6. BECOMING EASILY ANNOYED OR IRRITABLE: SEVERAL DAYS
IF YOU CHECKED OFF ANY PROBLEMS ON THIS QUESTIONNAIRE, HOW DIFFICULT HAVE THESE PROBLEMS MADE IT FOR YOU TO DO YOUR WORK, TAKE CARE OF THINGS AT HOME, OR GET ALONG WITH OTHER PEOPLE: VERY DIFFICULT
4. TROUBLE RELAXING: SEVERAL DAYS
GAD7 TOTAL SCORE: 9
GAD7 TOTAL SCORE: 9
2. NOT BEING ABLE TO STOP OR CONTROL WORRYING: MORE THAN HALF THE DAYS
7. FEELING AFRAID AS IF SOMETHING AWFUL MIGHT HAPPEN: SEVERAL DAYS
3. WORRYING TOO MUCH ABOUT DIFFERENT THINGS: MORE THAN HALF THE DAYS

## 2023-10-12 NOTE — PROGRESS NOTES
Assessment & Plan     (M25.50) Multiple joint pain  (primary encounter diagnosis)  Comment: pt has multiply joints pain for years: shoulder, hands, hips, knees, ankles. No swelling and erythema. No injury. Exam: generalized joints tenderness. No swelling, erythema. Non smoker. Unknown etiology. Ddx: OA, RA, vit deficiency ?   Plan: Comprehensive metabolic panel (BMP + Alb, Alk         Phos, ALT, AST, Total. Bili, TP), Cyclic         Citrullinated Peptide Antibody IgG, Rheumatoid         factor, ESR: Erythrocyte sedimentation rate,         CRP, inflammation, Vitamin D Deficiency        Will follow-up lab. Advise regular exercise and otc multiply vitamins with minerals.     (Z76.89) Encounter to establish care  Comment: pt switch care with us  Plan: advise to have pe if she is over due.     (H92.03) Otalgia of both ears  Comment: pt has chronic ears pain comes and goes. Right ear is worse. Left ear has ringing. Denies hearing loss. Pt state she had ent consult in the past but no treatment. Exam is not remarkable. Unknown etiology.   Plan: Adult ENT  Referral        Will have ent consult.     (R39.81) Functional urinary incontinence  Comment: pt has urinary incontinence for years and getting worse. She had hysterectomy. No dysuria and blood in urine.   Plan: Adult Urology  Referral        Will have consult with urology for possible surgery    (R42) Vertigo  Comment: pt has vertigo sometimes. No history of seizure, syncope, head injury. Sometimes she has left ear ringing. No hearing loss. Exam is not remarkable. Vertigo vs meniere's disease. She takes meclizine prn with help  Plan: meclizine (ANTIVERT) 25 MG tablet        Will take meclizine prn and follow-up with ent    (M54.50,  G89.29) Chronic bilateral low back pain without sciatica  Comment: pt has chronic low back pain bilateral. No injury and heavy lifting. No shooting pain and numbness and tingling to legs. No fever and weight loss. Good  "bladder and bm control. She takes flexeril prn with help. Exam not remarkable.   Plan: cyclobenzaprine (FLEXERIL) 10 MG tablet        We discussed the pt and she declined. Back rom exercise as tolerate and avoid heavy lifting. Follow-up if pain gets worse          BMI:   Estimated body mass index is 29.95 kg/m  as calculated from the following:    Height as of this encounter: 1.651 m (5' 5\").    Weight as of this encounter: 81.6 kg (180 lb).   Weight management plan: Discussed healthy diet and exercise guidelines    See Patient Instructions    Holly Moreno MD  [facility]    Yvrose Nugent is a 47 year old, presenting for the following health issues:  Establish Care        10/12/2023     7:53 AM   Additional Questions   Roomed by Gen ANSON CMA       History of Present Illness       Reason for visit:  Looking for a new PCP    She eats 0-1 servings of fruits and vegetables daily.She consumes 0 sweetened beverage(s) daily.She exercises with enough effort to increase her heart rate 30 to 60 minutes per day.  She exercises with enough effort to increase her heart rate 4 days per week.   She is taking medications regularly.      Chronic/Recurring Back Pain Follow Up    Where is your back pain located? (Select all that apply) low back bilateral  How would you describe your back pain?  dull ache  Where does your back pain spread? nowhere  Since your last clinic visit for back pain, how has your pain changed? unchanged  Does your back pain interfere with your job? Pt is disabled due to mental disorder.   Since your last visit, have you tried any new treatment? No        Review of Systems   Constitutional, HEENT, cardiovascular, pulmonary, gi and gu systems are negative, except as otherwise noted.      Objective    /76   Pulse 58   Temp 98  F (36.7  C) (Oral)   Resp 20   Ht 1.651 m (5' 5\")   Wt 81.6 kg (180 lb)   LMP  (LMP Unknown)   SpO2 96%   BMI 29.95 kg/m    Body mass index is " 29.95 kg/m .  Physical Exam   GENERAL: healthy, alert and no distress  HENT: ear canals and TM's normal, nose and mouth without ulcers or lesions  NECK: no adenopathy, no asymmetry, masses, or scars and thyroid normal to palpation  RESP: lungs clear to auscultation - no rales, rhonchi or wheezes  CV: regular rate and rhythm, normal S1 S2, no S3 or S4, no murmur, click or rub, no peripheral edema and peripheral pulses strong  ABDOMEN: soft, nontender, no hepatosplenomegaly, no masses and bowel sounds normal  MS: no gross musculoskeletal defects noted, no edema   Cervical, thoracic and lumbar spine exam   with mild  tenderness on low lumber, no masses or kyphoscoliosis. Full range of motion without pain is noted.          Prior to immunization administration, verified patients identity using patient s name and date of birth. Please see Immunization Activity for additional information.     Screening Questionnaire for Adult Immunization    Are you sick today?   No   Do you have allergies to medications, food, a vaccine component or latex?   Yes   Have you ever had a serious reaction after receiving a vaccination?   No   Do you have a long-term health problem with heart, lung, kidney, or metabolic disease (e.g., diabetes), asthma, a blood disorder, no spleen, complement component deficiency, a cochlear implant, or a spinal fluid leak?  Are you on long-term aspirin therapy?   No   Do you have cancer, leukemia, HIV/AIDS, or any other immune system problem?   No   Do you have a parent, brother, or sister with an immune system problem?   No   In the past 3 months, have you taken medications that affect  your immune system, such as prednisone, other steroids, or anticancer drugs; drugs for the treatment of rheumatoid arthritis, Crohn s disease, or psoriasis; or have you had radiation treatments?   No   Have you had a seizure, or a brain or other nervous system problem?   No   During the past year, have you received a  transfusion of blood or blood    products, or been given immune (gamma) globulin or antiviral drug?   No   For women: Are you pregnant or is there a chance you could become       pregnant during the next month?   No   Have you received any vaccinations in the past 4 weeks?   No     Immunization questionnaire was positive for at least one answer.  Notified Dr. Moreno.      Patient instructed to remain in clinic for 15 minutes afterwards, and to report any adverse reactions.     Screening performed by Racquel Ortiz MA on 10/12/2023 at 8:28 AM.

## 2023-10-13 LAB
CCP AB SER IA-ACNC: 0.7 U/ML
RHEUMATOID FACT SER NEPH-ACNC: <6 IU/ML

## 2023-10-13 ASSESSMENT — ANXIETY QUESTIONNAIRES: GAD7 TOTAL SCORE: 9

## 2023-10-17 ENCOUNTER — MYC MEDICAL ADVICE (OUTPATIENT)
Dept: FAMILY MEDICINE | Facility: CLINIC | Age: 48
End: 2023-10-17
Payer: COMMERCIAL

## 2023-10-24 ENCOUNTER — PATIENT OUTREACH (OUTPATIENT)
Dept: ONCOLOGY | Facility: CLINIC | Age: 48
End: 2023-10-24
Payer: COMMERCIAL

## 2023-10-24 NOTE — TELEPHONE ENCOUNTER
MEDICAL RECORDS REQUEST   [facility]  Urology Clinic  [address] [place] [address]  PHONE: [phone]  Fax: [phone]        FUTURE VISIT INFORMATION                                                   Orquidea Diggs, : 1975 scheduled for future visit at [facility]    APPOINTMENT INFORMATION:  Date: 10/26/2023  Provider:  Basia Tavares MD  Reason for Visit/Diagnosis: Incontinence    REFERRAL INFORMATION:  Referring provider:  Holly Moreno MD in [facility]      RECORDS REQUESTED FOR VISIT                                                     NOTES  STATUS/DETAILS   OFFICE NOTE from referring provider  yes 10/12/2023 -- Holly Moreno MD in [facility]   MEDICATION LIST  yes   LABS     URINALYSIS (UA)  yes     PRE-VISIT CHECKLIST      Joint diagnostic appointment coordinated correctly          (ensure right order & amount of time) Yes   RECORD COLLECTION COMPLETE Yes

## 2023-10-25 ENCOUNTER — PRE VISIT (OUTPATIENT)
Dept: UROLOGY | Facility: CLINIC | Age: 48
End: 2023-10-25
Payer: COMMERCIAL

## 2023-10-25 NOTE — TELEPHONE ENCOUNTER
Reason for visit: Incontinence consult     Relevant information: history of joint pain, history of hysterectomy    Records/imaging/labs/orders: all records available    Pt called: no need for a call    At [facility]: collect a urine/pvr      Elicia Horn CMA  10/25/2023  8:27 AM

## 2023-10-26 ENCOUNTER — OFFICE VISIT (OUTPATIENT)
Dept: UROLOGY | Facility: CLINIC | Age: 48
End: 2023-10-26
Attending: FAMILY MEDICINE
Payer: COMMERCIAL

## 2023-10-26 ENCOUNTER — PRE VISIT (OUTPATIENT)
Dept: UROLOGY | Facility: CLINIC | Age: 48
End: 2023-10-26

## 2023-10-26 VITALS
HEART RATE: 74 BPM | DIASTOLIC BLOOD PRESSURE: 81 MMHG | BODY MASS INDEX: 28.93 KG/M2 | HEIGHT: 66 IN | WEIGHT: 180 LBS | SYSTOLIC BLOOD PRESSURE: 125 MMHG

## 2023-10-26 DIAGNOSIS — N39.46 MIXED STRESS AND URGE URINARY INCONTINENCE: ICD-10-CM

## 2023-10-26 DIAGNOSIS — M79.18 MYALGIA OF PELVIC FLOOR: Primary | ICD-10-CM

## 2023-10-26 DIAGNOSIS — N32.81 OAB (OVERACTIVE BLADDER): ICD-10-CM

## 2023-10-26 DIAGNOSIS — M62.89 HIGH-TONE PELVIC FLOOR DYSFUNCTION: ICD-10-CM

## 2023-10-26 PROBLEM — R39.81 FUNCTIONAL URINARY INCONTINENCE: Status: ACTIVE | Noted: 2023-10-26

## 2023-10-26 PROCEDURE — 99204 OFFICE O/P NEW MOD 45 MIN: CPT | Performed by: UROLOGY

## 2023-10-26 RX ORDER — MIRABEGRON 25 MG/1
25 TABLET, FILM COATED, EXTENDED RELEASE ORAL DAILY
Qty: 30 TABLET | Refills: 3 | Status: SHIPPED | OUTPATIENT
Start: 2023-10-26 | End: 2024-02-27

## 2023-10-26 ASSESSMENT — PAIN SCALES - GENERAL: PAINLEVEL: NO PAIN (0)

## 2023-10-26 NOTE — TELEPHONE ENCOUNTER
FUTURE VISIT INFORMATION      FUTURE VISIT INFORMATION:  Date: 12/20/23  Time: 2:50 PM  [place] [facility] [place]  REFERRAL INFORMATION:  Referring provider:  Holly Moreno MD  Referring providers clinic:    [facility]  Reason for visit/diagnosis  Otalgia of both ears. Ref by Holly Moreno MD. CSC verified. Records in [facility]     RECORDS REQUESTED FROM:       Clinic name Comments Records Status Imaging Status   [facility] 10/12/23 OV with Holly Moreno MD [facility]  MRN: [id number] 9/13/22 OV with Antonio Farmer M.D.    9/13/22 audiogram    MR brain 10/21/22 CE Pending req 11/28/23    PACS   [facility] 12/23/20 OV with Luis Alberto Grey MD   CE            November 28, 2023 12:27 PM - Request for images pushed to [place] from [facility]  November 29, 2023 1:20 PM - Image in PACS [facility]

## 2023-10-26 NOTE — PATIENT INSTRUCTIONS
Monitor your blood pressure after you start the medication    Websites with free information:    [employer] patient website: www.voicesforpfd.org    Total Control Program: www.totalcontrolprogram.com    Please see one of the dedicated pelvic floor physical therapist. If you have not heard from the scheduling office within 2 business days, please call [phone] for [employer] [facility] [place]    Please return to see me in 3 months, sooner if needed    It was a pleasure meeting with you today.  Thank you for allowing me and my team the privilege of caring for you today.  YOU are the reason we are here, and I truly hope we provided you with the excellent service you deserve.  Please let us know if there is anything else we can do for you so that we can be sure you are leaving completely satisfied with your care experience.

## 2023-10-26 NOTE — LETTER
10/26/2023       RE: Orquidea Diggs  [address] [id number]     Dear Colleague,    Thank you for referring your patient, Orquidea Diggs, to the [facility] [place] at [facility]. Please see a copy of my visit note below.    October 26, 2023    Referring Provider: Holly Moreno MD  [address]  [place] [address]    Primary Care Provider: Katlin Garcia    Assessment & Plan    Mixed stress and urge urinary incontinence  We discussed the etiologies of stress and urge incontinence and how they differ. We discussed that the options of treating stress incontinence to include observation, weight loss, pelvic floor physical therapy, incontinence pessary, urethral bulking agents, and surgical correction most commonly with midurethral sling or autologous rectus fascial sling. We then discussed that urge incontinence treatments include observation, weight loss, medications most commonly anticholinergics, physical therapy, biofeedback, intravesical botulinum toxin, percutaneous tibial nerve stimulation and sacral neuromodulation.     We discussed how her pelvic floor symptoms are related to the physical exam findings and her pelvic floor myofascial dysfunction.  We discussed how the recommended treatment is dedicated pelvic floor therapy.  We discussed how the pelvic floor physical therapy works and patient is agreeable.  Referral was placed.      - Physical Therapy Referral; Future  - mirabegron (MYRBETRIQ) 25 MG 24 hr tablet; Take 1 tablet (25 mg) by mouth daily    Myalgia of pelvic floor/High-tone pelvic floor dysfunction  We discussed how her pelvic floor symptoms are related to the physical exam findings and her pelvic floor myofascial dysfunction.  We discussed how the recommended treatment is dedicated pelvic floor therapy.  We discussed how the pelvic floor physical therapy works and patient is agreeable.   Referral was placed.      - Physical Therapy Referral; Future    OAB (overactive bladder)  Bothered enough that wants medications.  Discussed anticholinergics versus beta 3 agonists  - mirabegron (MYRBETRIQ) 25 MG 24 hr tablet; Take 1 tablet (25 mg) by mouth daily    Return in about 3 months (around 1/26/2024).    20 minutes were spent on this day of the encounter in reviewing the EMR including reviewing Dr Chowdary's note, direct patient care including prescription medications, coordination of care and documentation    Basia Tavares MD MPH  (she/her/hers)   of Urology  [facility]    HPI:  Orquidea Diggs is a 47 year old female who presents for evaluation of her pelvic floor symptoms.  She has mixed urinary incontinence for quite some time, has now been bothersome enough to come in.    History of endometrial cancer s/p JIMBO/BSO, note from Dr Chowdary from 5/11/23 reviewed.    Denies gross hematuria, UTI, vaginal bleeding, vaginal bulge    Social History     Socioeconomic History    Marital status:      Spouse name: Not on file    Number of children: Not on file    Years of education: Not on file    Highest education level: Not on file   Occupational History    Not on file   Tobacco Use    Smoking status: Never     Passive exposure: Past    Smokeless tobacco: Never   Substance and Sexual Activity    Alcohol use: Yes     Comment: occasionally    Drug use: Not on file    Sexual activity: Not on file   Other Topics Concern    Not on file   Social History Narrative    Not on file     Social Determinants of Health     Financial Resource Strain: Low Risk  (10/12/2023)    Financial Resource Strain     Within the past 12 months, have you or your family members you live with been unable to get utilities (heat, electricity) when it was really needed?: No   Food Insecurity: Low Risk  (10/12/2023)    Food Insecurity     Within the past 12 months, did you worry that your food would run out  "before you got money to buy more?: No     Within the past 12 months, did the food you bought just not last and you didn t have money to get more?: No   Transportation Needs: Low Risk  (10/12/2023)    Transportation Needs     Within the past 12 months, has lack of transportation kept you from medical appointments, getting your medicines, non-medical meetings or appointments, work, or from getting things that you need?: No   Physical Activity: Not on file   Stress: Not on file   Social Connections: Not on file   Interpersonal Safety: Low Risk  (10/12/2023)    Interpersonal Safety     Do you feel physically and emotionally safe where you currently live?: Yes     Within the past 12 months, have you been hit, slapped, kicked or otherwise physically hurt by someone?: No     Within the past 12 months, have you been humiliated or emotionally abused in other ways by your partner or ex-partner?: No   Housing Stability: Low Risk  (10/12/2023)    Housing Stability     Do you have housing? : Yes     Are you worried about losing your housing?: No     No family history on file.    ROS    Allergies   Allergen Reactions    Ciprofloxacin Dizziness     Current Outpatient Medications   Medication    mirabegron (MYRBETRIQ) 25 MG 24 hr tablet    buPROPion (WELLBUTRIN XL) 150 MG 24 hr tablet    cyclobenzaprine (FLEXERIL) 10 MG tablet    LORazepam (ATIVAN) 0.5 MG tablet    meclizine (ANTIVERT) 25 MG tablet    ondansetron (ZOFRAN) 4 MG tablet    sertraline (ZOLOFT) 50 MG tablet     No current facility-administered medications for this visit.     /81   Pulse 74   Ht 1.676 m (5' 6\")   Wt 81.6 kg (180 lb)   LMP  (LMP Unknown)   BMI 29.05 kg/m    GENERAL: healthy, alert and no distress  EYES: Eyes grossly normal to inspection, conjunctivae and sclerae normal  HENT: normal cephalic/atraumatic.  External ears, nose and mouth without ulcers or lesions.  RESP: no audible wheeze, cough, or visible cyanosis.  No visible retractions or " increased work of breathing.  Able to speak fully in complete sentences.  NEURO: Cranial nerves grossly intact, mentation intact and speech normal  PSYCH: mentation appears normal, affect normal/bright, judgement and insight intact, normal speech and appearance well-groomed  ABD: soft, nontender, nondistended, no organomegaly  : normal external genitalia.  Pelvic exam remarkabe for high tone pelvic floor with myofascial tenderness    PVR 1 mL by bladder scan    CC  Patient Care Team:  Katlin Garcia MD as PCP - General  Sheba Chowdary MD as MD (Gynecologic Oncology)  Sheba Chowdary MD as Assigned Cancer Care Provider  Holly Guerrero MD as Assigned PCP  Katie Hurd AuD (Audiology)  Effie Hanson NP as Nurse Practitioner  Holly Guerrero MD as Referring Physician (Family Medicine)  Basia Tavares MD as MD (Urology)  HOLLY GUERRERO

## 2023-10-26 NOTE — PROGRESS NOTES
October 26, 2023    Referring Provider: Holly Moreno MD  [address] [place] [address]    Primary Care Provider: Katlin Garcia    Assessment & Plan     Mixed stress and urge urinary incontinence  We discussed the etiologies of stress and urge incontinence and how they differ. We discussed that the options of treating stress incontinence to include observation, weight loss, pelvic floor physical therapy, incontinence pessary, urethral bulking agents, and surgical correction most commonly with midurethral sling or autologous rectus fascial sling. We then discussed that urge incontinence treatments include observation, weight loss, medications most commonly anticholinergics, physical therapy, biofeedback, intravesical botulinum toxin, percutaneous tibial nerve stimulation and sacral neuromodulation.     We discussed how her pelvic floor symptoms are related to the physical exam findings and her pelvic floor myofascial dysfunction.  We discussed how the recommended treatment is dedicated pelvic floor therapy.  We discussed how the pelvic floor physical therapy works and patient is agreeable.  Referral was placed.      - Physical Therapy Referral; Future  - mirabegron (MYRBETRIQ) 25 MG 24 hr tablet; Take 1 tablet (25 mg) by mouth daily    Myalgia of pelvic floor/High-tone pelvic floor dysfunction  We discussed how her pelvic floor symptoms are related to the physical exam findings and her pelvic floor myofascial dysfunction.  We discussed how the recommended treatment is dedicated pelvic floor therapy.  We discussed how the pelvic floor physical therapy works and patient is agreeable.  Referral was placed.      - Physical Therapy Referral; Future    OAB (overactive bladder)  Bothered enough that wants medications.  Discussed anticholinergics versus beta 3 agonists  - mirabegron (MYRBETRIQ) 25 MG 24 hr tablet; Take 1 tablet (25 mg) by mouth daily    Return in about 3 months (around 1/26/2024).    20  minutes were spent on this day of the encounter in reviewing the EMR including reviewing Dr Chowdary's note, direct patient care including prescription medications, coordination of care and documentation    Basia Tavares MD MPH  (she/her/hers)   of Urology  [facility]    HPI:  Orquidea Diggs is a 47 year old female who presents for evaluation of her pelvic floor symptoms.  She has mixed urinary incontinence for quite some time, has now been bothersome enough to come in.    History of endometrial cancer s/p JIMBO/BSO, note from Dr Chowdary from 5/11/23 reviewed.    Denies gross hematuria, UTI, vaginal bleeding, vaginal bulge    Social History     Socioeconomic History    Marital status:      Spouse name: Not on file    Number of children: Not on file    Years of education: Not on file    Highest education level: Not on file   Occupational History    Not on file   Tobacco Use    Smoking status: Never     Passive exposure: Past    Smokeless tobacco: Never   Substance and Sexual Activity    Alcohol use: Yes     Comment: occasionally    Drug use: Not on file    Sexual activity: Not on file   Other Topics Concern    Not on file   Social History Narrative    Not on file     Social Determinants of Health     Financial Resource Strain: Low Risk  (10/12/2023)    Financial Resource Strain     Within the past 12 months, have you or your family members you live with been unable to get utilities (heat, electricity) when it was really needed?: No   Food Insecurity: Low Risk  (10/12/2023)    Food Insecurity     Within the past 12 months, did you worry that your food would run out before you got money to buy more?: No     Within the past 12 months, did the food you bought just not last and you didn t have money to get more?: No   Transportation Needs: Low Risk  (10/12/2023)    Transportation Needs     Within the past 12 months, has lack of transportation kept you from medical appointments,  "getting your medicines, non-medical meetings or appointments, work, or from getting things that you need?: No   Physical Activity: Not on file   Stress: Not on file   Social Connections: Not on file   Interpersonal Safety: Low Risk  (10/12/2023)    Interpersonal Safety     Do you feel physically and emotionally safe where you currently live?: Yes     Within the past 12 months, have you been hit, slapped, kicked or otherwise physically hurt by someone?: No     Within the past 12 months, have you been humiliated or emotionally abused in other ways by your partner or ex-partner?: No   Housing Stability: Low Risk  (10/12/2023)    Housing Stability     Do you have housing? : Yes     Are you worried about losing your housing?: No     No family history on file.    ROS    Allergies   Allergen Reactions    Ciprofloxacin Dizziness     Current Outpatient Medications   Medication    mirabegron (MYRBETRIQ) 25 MG 24 hr tablet    buPROPion (WELLBUTRIN XL) 150 MG 24 hr tablet    cyclobenzaprine (FLEXERIL) 10 MG tablet    LORazepam (ATIVAN) 0.5 MG tablet    meclizine (ANTIVERT) 25 MG tablet    ondansetron (ZOFRAN) 4 MG tablet    sertraline (ZOLOFT) 50 MG tablet     No current facility-administered medications for this visit.     /81   Pulse 74   Ht 1.676 m (5' 6\")   Wt 81.6 kg (180 lb)   LMP  (LMP Unknown)   BMI 29.05 kg/m    GENERAL: healthy, alert and no distress  EYES: Eyes grossly normal to inspection, conjunctivae and sclerae normal  HENT: normal cephalic/atraumatic.  External ears, nose and mouth without ulcers or lesions.  RESP: no audible wheeze, cough, or visible cyanosis.  No visible retractions or increased work of breathing.  Able to speak fully in complete sentences.  NEURO: Cranial nerves grossly intact, mentation intact and speech normal  PSYCH: mentation appears normal, affect normal/bright, judgement and insight intact, normal speech and appearance well-groomed  ABD: soft, nontender, nondistended, no " organomegaly  : normal external genitalia.  Pelvic exam remarkabe for high tone pelvic floor with myofascial tenderness    PVR 1 mL by bladder scan    CC  Patient Care Team:  Katlin Garcia MD as PCP - General  Sheba Chowdary MD as MD (Gynecologic Oncology)  Sheba Chowdary MD as Assigned Cancer Care Provider  Holly Guerrero MD as Assigned PCP  Katie Hurd AuD (Audiology)  Effie Hanson NP as Nurse Practitioner  Holly Guerrero MD as Referring Physician (Family Medicine)  Basia Tavares MD as MD (Urology)  HOLLY GUERRERO

## 2023-10-26 NOTE — NURSING NOTE
"Chief Complaint   Patient presents with    Consult For     Leakage & options        Blood pressure 125/81, pulse 74, height 1.676 m (5' 6\"), weight 81.6 kg (180 lb). Body mass index is 29.05 kg/m .    Patient Active Problem List   Diagnosis    Uterine leiomyoma    Lymphadenopathy    Hydronephrosis    Generalized anxiety disorder    Endometrial stromal sarcoma (H)    Deep vein thrombosis (DVT) of lower extremity (H)    Atypical squamous cells of undetermined significance (ASCUS) on Papanicolaou smear of cervix    Major depressive disorder, single episode, moderate (H)       Allergies   Allergen Reactions    Ciprofloxacin Dizziness       Current Outpatient Medications   Medication Sig Dispense Refill    buPROPion (WELLBUTRIN XL) 150 MG 24 hr tablet Take 150 mg by mouth every morning      cyclobenzaprine (FLEXERIL) 10 MG tablet Take 1 tablet (10 mg) by mouth nightly as needed for muscle spasms 30 tablet 3    LORazepam (ATIVAN) 0.5 MG tablet Take 0.5 mg by mouth      meclizine (ANTIVERT) 25 MG tablet Take 1 tablet (25 mg) by mouth 3 times daily as needed for dizziness 20 tablet 1    ondansetron (ZOFRAN) 4 MG tablet TAKE 1 TABLET BY MOUTH THREE TIMES DAILY AS NEEDED FOR NAUSEA      sertraline (ZOLOFT) 50 MG tablet Take 75 mg by mouth daily         Social History     Tobacco Use    Smoking status: Never     Passive exposure: Past    Smokeless tobacco: Never   Substance Use Topics    Alcohol use: Yes     Comment: occasionally       Ara Mazariegos  10/26/2023  2:39 PM     "

## 2023-11-03 ENCOUNTER — TELEPHONE (OUTPATIENT)
Dept: UROLOGY | Facility: CLINIC | Age: 48
End: 2023-11-03

## 2023-11-03 NOTE — TELEPHONE ENCOUNTER
Left message to schedule next available in person appointment for PVR with Dr. Tavares.  Per checkout notes to schedule 3 month follow up but Dr. Tavares is booked past time. Got approved to schedule next available.

## 2023-11-03 NOTE — TELEPHONE ENCOUNTER
----- Message from Yumiko Hill RN sent at 11/2/2023  2:49 PM CDT -----  Regarding: RE: 3 month follow up  Okay   Schedule for February please  thanks  ----- Message -----  From: Carmen Navarro  Sent: 10/26/2023   3:29 PM CDT  To: Yumiko Hill RN  Subject: 3 month follow up                                Per checkout notes Dr Tavares would like 3 month follow up. She has no return spots till end of February. Pt has to be in person for PVR.

## 2023-11-08 NOTE — PROGRESS NOTES
Consult Notes on Referred Patient    Date: 23         Dr. Samantha Anderson MD  No address on file       RE: Orquidea Diggs  : 1975  EVER: 23      Dear Dr. Referred Self:    I had the pleasure of seeing your patient Orquidea Diggs here at the Gy[facility]ologic Cancer Clinic at the [facility] on 23.  As you know she is a very pleasant 47 year old woman with a recent diagnosis of  ESS.  She presents today in follow up.    HPI  Ms. Arevalo is a very pleasant 47 y.o. woman with the following oncologic history:    Oncology History   Sarcoma Endometrial Stromal (HCC)   Genetic Testing and Tumor Genotyping   None    Initial Diagnosis   Sarcoma Endometrial Stromal (HCC)    Symptoms began in 2019, right lower quadrant abdominal pain, normal menstrual period. Ultrasound revealed multiple fibroids. Managed with Lupron considered but patient declined.    2019 Surgery and Procedures with OB/GYN  total laparoscopic hysterectomy, bilateral salpingectomy with only right oophorectomy. A presumed fibroid mass wrapped around a ligament. Left ovary appeared normal. 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 [facility] did not see it involving the fallopian tube but such was recorded/reported.    ([facility]: 14.5 cm LGESS extending into 1 FT; LVSI +)    2019, CT chest abdomen pelvis shows right and left pelvic masses possibly representing metastatic disease. Chest imaging was not definitive for metastases with some enlarged but not significantly enlarged nodes:  Postoperative changes of hysterectomy, bilateral salpingectomy, and right  oophorectomy.     Within the right adnexal region there is a 3.9 x 3.3 cm hyperenhancing soft  tissue mass concerning for metastatic disease or local recurrence. Expansive  filling defect within the right internal iliac vein that extends cranially to  the level of the mid right  common iliac vein (series 4, image 166; series 605,  image 65). The density is similar to the right adnexal mass, which is concerning  for tumor thrombus. No evidence of DVT in the right external iliac or common  femoral veins.    Within the left adnexa there is a 7.1 x 3.7 cm loculated cystic lesion.     Multiple tiny hypodense hepatic lesions most likely represent benign  hemangiomas/cysts, but are indeterminate for metastases. The gallbladder,  pancreas, adrenal glands, and kidneys are normal. Small splenules. No  lymphadenopathy in the abdomen or pelvis. Normal caliber small and large bowel.  No aggressive osseous lesions.    1/17/2020 Tumor Board   Path review: LGESS arising from the myometrium perhaps extending to uterine cornua but no evidence of the slides submitted that there is any disease on a fallopian tube or ovary  Rad review: Some LGESS may have FDG update on PET; mediastinal node is suspicious   Biopsy of the mediastinal node and if positive, consider resection vs. adjuvant treatment (hormone therapy) after surgery  Return to the OR for resection of residual disease and LSO    2/10/2020 Surgery and Procedures:02/10/2020 EXPLORATORY LAPAROTOMY., LEFT OOPHORECTOMY, RESECTION PELVIC MASS., UPPER VAGINECTOMY, CYSTOSCOPY, INSERTION STENT URETER., Resection of right internal iliac vein with intravenous tumor. Repair of right common iliac vein., Lymphadenectomy     Surgery  Exlap, resection of pelvic disease remaining from original surgery elsewhere. Resection of tumor mass within the right pelvic iliac vein system.   Complete gross resection.   Stage IIB    3/7/2020 - Biological/Targeted/Hormone Therapy   Letrozole 2.5 daily. At first month, nausea is minimal. Hot flashes are ok. Working from home and staying active. 2-3 mi walking 5 days. CrossFit 40 min three times weekly. Baseline bone mineral density is good, no osteopenia.    3/2023: Letrozole stopped due to side effects     3/20/23: CT  CAP    COMPARISON:  Multiple priors, most recently CT 06/11/2022     FINDINGS:     Prior hysterectomy, bilateral salpingo-oophorectomy, right iliac vein resection, and partial   vaginectomy for endometrial stromal sarcoma. Postsurgical changes and surgical clips in the pelvis.     There is tethering of the right distal ureter in the pelvis with mild associated urothelial   thickening and enhancement, for example series 1, image 111. Mild upstream hydroureter without   hydronephrosis. The contralateral ureter is normal. The bladder is within expected limits. No   recurrent soft tissue in the pelvis or along the iliac veins. No new adenopathy or free fluid.     Multiple hypoattenuating lesions in the liver are stable dating back to 01/22/2020 and likely   represent benign cysts. No suspicious hepatic masses. Non-cirrhotic morphology of the liver with   patent portal and hepatic veins.     The gallbladder and biliary tree are normal. No suspicious pancreatic masses. The adrenal glands and   kidneys are normal. Normal spleen with 2 small splenules. The bowel is within expected limits.     Tiny fat-containing umbilical hernia. No aggressive osseous lesions.     A CT chest was performed at the same time which will be reported separately.     IMPRESSION:   1.  No findings of recurrence in the abdomen or pelvis.   2.  A CT chest was performed at the same time which will be reported separately.    CT chest:  FINDINGS:   This examination was performed in conjunction with a CT of the abdomen, which will be reported   separately.     No suspicious lung nodule. No bulky thoracic lymphadenopathy. No suspicious osseous lesion. No   pleural effusion. Interval left breast implant rupture. Right breast implant appears similar since   prior exam.     IMPRESSION:   1.  No thoracic metastatic disease.   2.  Interval left breast implant rupture.    11/2023: DXA scan  NORMAL. Bone mineral density measurements are within normal limits  using T score.      Today:  Off work since 5/23 due to depression and anxiety, gaining weight, looking for long term disability, was seen in ED for tremors-told conversion disorder common with anxiety/depression-having physical symptoms. Multiple things contributing to her stress and anxiety. Seeing a therapist. Does not trust her body or her doctors-constantly questions what being told by physicians. On sertraline and Wellbutrin. Has a psychiatrist. Has therapy appointment today and 2 mos from now with psychiatrist. Intermittent right groin discomfort, not constant-told musculoskeletal. Uses ice and heat. Has hot flashes. Looking for long term disability and social security-denied once.     Review of Systems:    I have reviewed the pertinent positive findings in the review of symptoms with the patient.    Past Medical History:     IBS: diarrhea and constipation  anxiety    Past Surgical History:  See above    Health Maintenance:  Health Maintenance Due   Topic Date Due    YEARLY PREVENTIVE VISIT  Never done    ADVANCE CARE PLANNING  Never done    DEPRESSION ACTION PLAN  Never done    MAMMO SCREENING  Never done    HEPATITIS B IMMUNIZATION (1 of 3 - 3-dose series) Never done    Pneumococcal Vaccine: Pediatrics (0 to 5 Years) and At-Risk Patients (6 to 64 Years) (1 - PCV) Never done    COVID-19 Vaccine (3 - Pfizer risk series) 11/24/2021    INFLUENZA VACCINE (1) Never done          She has had a history of abnormal Pap smears.  ASCUS    Last Mammogram: 2022             Result: normal      She has not had a history of abnormal mammograms.    Last Colonoscopy: 2021              Result: abnormal: polyps repeat in 5 years.     TSH 3.09 10/2020         Current Medications:     has a current medication list which includes the following prescription(s): bupropion, cyclobenzaprine, lorazepam, meclizine, mirabegron, ondansetron, and sertraline.       Allergies:     Ciprofloxacin: dizzy       Social History:     Social History      Tobacco Use    Smoking status: Never     Passive exposure: Past    Smokeless tobacco: Never   Substance Use Topics    Alcohol use: Yes     Comment: occasionally       History   Drug Use Not on file       No tobacco, no drugs, occ ETOH    Family History:     The patient's family history is notable for the following:    No breast, colon or ovarian cancer    Physical Exam:     /71   Pulse 68   Temp 97.9  F (36.6  C) (Oral)   Resp 18   Wt 83.4 kg (183 lb 12.8 oz)   LMP  (LMP Unknown)   SpO2 96%   BMI 29.67 kg/m    Body mass index is 29.67 kg/m .    General Appearance: healthy and alert, appears anxious, sweaty with hot flashes     HEENT:   , no palpable nodules or masses        Cardiovascular: regular rate and rhythm, no gallops, rubs or murmurs     Respiratory: lungs clear, no rales, rhonchi or wheezes, normal diaphragmatic excursion    Musculoskeletal: extremities non tender and without edema    Neurological: normal gait, no gross defects     Psychiatric: appropriate mood and affect                               Hematological: normal cervical, supraclavicular and inguinal lymph nodes     Gastrointestinal:       abdomen soft, non-tender, non-distended, no organomegaly or masses, vertical midline incision well healed. Palpation of right lower quadrant can reproduce pain with palpation to medial aspect of Ant superior iliac spine.    Genitourinary: External genitalia and urethral meatus appears normal.  Vagina is smooth without nodularity or masses.  Cervix absent.  Bimanual exam reveal no masses, nodularity or fullness.         Assessment:    Orquidea Diggs is a 47 year old woman with a diagnosis of ESS with history of letrozole x 3 years. Recently stopped in 3/2023 due to menopausal side effects. Having significant anxiety, depression now-multi-factorial in nature.   -Stress/urge incontinence-following with Dr. Tavares now.       Plan:     1.)    ESS: Recently stopped letrozole. Anxiety issues and  hot flashes, discussed celexa or restarting Effexor. Patient would benefit from NP survivorship visit and Dr. Dobson consult. Based on imaging JOSR. I reviewed these today with the patient.  -Recommend q 6 mos visits and yearly CTCAP-due 3/2024  -Right groin discomfort: encouraged 600 mg ibuprofen x 3 days with foot to see if improvement. Suspect muscle strain discomfort. Intermittent in nature, still present on and off.   -NP for survivorship visit   -Primary care MD set up with one looking at seeing a new person in January.   -DXA scan for bone density in 11/23 normal  -Mental health: encouraged patient to follow up with local [facility] clinic for counseling and possibly for initiation of anti-depressants. Patient admits she has done this and will follow up with them.   -RTC in 6 months with me. CT scan in 3/2024  -Discussed hot flashes and that I do not recommend HRT due to the LGESS and the hormonal responsiveness to this tumor. Discussed Fezolinetant and NK3 receptor antagonist for hot flashes, she will discuss with her psychiatrist as perhaps would help with some of the anxiety she is experiencing.      2.) Genetic risk factors were assessed and the patient does not meet the qualifications for a referral.      3.) Labs and/or tests ordered include:  None today     4.) Health maintenance issues addressed today include colonoscopy        Thank you for allowing us to participate in the care of your patient.         Sincerely,    Sheba Chowdary MD    Department of Ob/Gyn and Women's Health  Division of Gynecologic Oncology  [facility]  [phone]    Of a 30 minute appointment, more than 50% was spent in counseling the patient.    CC  Patient Care Team:  Katlin Garcia MD as PCP - General  Sheba Chowdary MD as MD (Gynecologic Oncology)  Sheba Chowdary MD as Assigned Cancer Care Provider  Holly Moreno MD as Assigned PCP  Katie Hurd AuD  (Audiology)  Effie Hanson, NP as Nurse Practitioner  Holly Moreno MD as Referring Physician (Family Medicine)  Basia Tavares MD as MD (Urology)  SELF, REFERRED

## 2023-11-09 ENCOUNTER — ONCOLOGY VISIT (OUTPATIENT)
Dept: ONCOLOGY | Facility: CLINIC | Age: 48
End: 2023-11-09
Attending: OBSTETRICS & GYNECOLOGY
Payer: COMMERCIAL

## 2023-11-09 VITALS
DIASTOLIC BLOOD PRESSURE: 71 MMHG | HEART RATE: 68 BPM | OXYGEN SATURATION: 96 % | SYSTOLIC BLOOD PRESSURE: 115 MMHG | WEIGHT: 183.8 LBS | TEMPERATURE: 97.9 F | BODY MASS INDEX: 29.67 KG/M2 | RESPIRATION RATE: 18 BRPM

## 2023-11-09 DIAGNOSIS — C54.1 ENDOMETRIAL CANCER (H): Primary | ICD-10-CM

## 2023-11-09 PROCEDURE — 99214 OFFICE O/P EST MOD 30 MIN: CPT | Performed by: OBSTETRICS & GYNECOLOGY

## 2023-11-09 PROCEDURE — 99213 OFFICE O/P EST LOW 20 MIN: CPT | Performed by: OBSTETRICS & GYNECOLOGY

## 2023-11-09 ASSESSMENT — PAIN SCALES - GENERAL: PAINLEVEL: MODERATE PAIN (4)

## 2023-11-09 NOTE — PROGRESS NOTES
Pt left message on voice mail  Wants to set up bone density exam at [facility] in [place]  Please fax order  [phone]

## 2023-11-09 NOTE — NURSING NOTE
"Oncology Rooming Note    November 9, 2023 10:24 AM   Orquidea Diggs is a 47 year old female who presents for:    Chief Complaint   Patient presents with    Oncology Clinic Visit     Endometrial stromal sarcoma     Initial Vitals: /71   Pulse 68   Temp 97.9  F (36.6  C) (Oral)   Resp 18   Wt 83.4 kg (183 lb 12.8 oz)   LMP  (LMP Unknown)   SpO2 96%   BMI 29.67 kg/m   Estimated body mass index is 29.67 kg/m  as calculated from the following:    Height as of 10/26/23: 1.676 m (5' 6\").    Weight as of this encounter: 83.4 kg (183 lb 12.8 oz). Body surface area is 1.97 meters squared.  Moderate Pain (4) Comment: Data Unavailable   No LMP recorded (lmp unknown). Patient has had a hysterectomy.  Allergies reviewed: Yes  Medications reviewed: Yes    Medications: Medication refills not needed today.  Pharmacy name entered into [facility]: [facility] [address] [place], [place] - [address]    Clinical concerns: none       Vidhi Aldrich MA             "

## 2023-11-09 NOTE — LETTER
2023         RE: Orquidea Diggs  [address] [facility] [address]        Dear Colleague,    Thank you for referring your patient, Orquidea Diggs, to the [facility]. Please see a copy of my visit note below.                            Consult Notes on Referred Patient    Date: 23         Dr. Samantha Anderson MD  No address on file       RE: Orquidea Diggs  : 1975  EVER: 23      Dear Dr. Referred Self:    I had the pleasure of seeing your patient Orquidea Diggs here at the Gynecologic Cancer Clinic at the [facility] on 23.  As you know she is a very pleasant 47 year old woman with a recent diagnosis of  ESS.  She presents today in follow up.    HPI  Ms. Arevalo is a very pleasant 47 y.o. woman with the following oncologic history:    Oncology History   Sarcoma Endometrial Stromal (HCC)   Genetic Testing and Tumor Genotyping   None    Initial Diagnosis   Sarcoma Endometrial Stromal (HCC)    Symptoms began in 2019, right lower quadrant abdominal pain, normal menstrual period. Ultrasound revealed multiple fibroids. Managed with Lupron considered but patient declined.    2019 Surgery and Procedures with OB/GYN  total laparoscopic hysterectomy, bilateral salpingectomy with only right oophorectomy. A presumed fibroid mass wrapped around a ligament. Left ovary appeared normal. 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 [facility] did not see it involving the fallopian tube but such was recorded/reported.    ([facility]: 14.5 cm LGESS extending into 1 FT; LVSI +)    2019, CT chest abdomen pelvis shows right and left pelvic masses possibly representing metastatic disease. Chest imaging was not definitive for metastases with some enlarged but not significantly enlarged nodes:  Postoperative changes of hysterectomy, bilateral salpingectomy, and right  oophorectomy.      Within the right adnexal region there is a 3.9 x 3.3 cm hyperenhancing soft  tissue mass concerning for metastatic disease or local recurrence. Expansive  filling defect within the right internal iliac vein that extends cranially to  the level of the mid right common iliac vein (series 4, image 166; series 605,  image 65). The density is similar to the right adnexal mass, which is concerning  for tumor thrombus. No evidence of DVT in the right external iliac or common  femoral veins.    Within the left adnexa there is a 7.1 x 3.7 cm loculated cystic lesion.     Multiple tiny hypodense hepatic lesions most likely represent benign  hemangiomas/cysts, but are indeterminate for metastases. The gallbladder,  pancreas, adrenal glands, and kidneys are normal. Small splenules. No  lymphadenopathy in the abdomen or pelvis. Normal caliber small and large bowel.  No aggressive osseous lesions.    1/17/2020 Tumor Board   Path review: LGESS arising from the myometrium perhaps extending to uterine cornua but no evidence of the slides submitted that there is any disease on a fallopian tube or ovary  Rad review: Some LGESS may have FDG update on PET; mediastinal node is suspicious   Biopsy of the mediastinal node and if positive, consider resection vs. adjuvant treatment (hormone therapy) after surgery  Return to the OR for resection of residual disease and LSO    2/10/2020 Surgery and Procedures:02/10/2020 EXPLORATORY LAPAROTOMY., LEFT OOPHORECTOMY, RESECTION PELVIC MASS., UPPER VAGINECTOMY, CYSTOSCOPY, INSERTION STENT URETER., Resection of right internal iliac vein with intravenous tumor. Repair of right common iliac vein., Lymphadenectomy     Surgery  Exlap, resection of pelvic disease remaining from original surgery elsewhere. Resection of tumor mass within the right pelvic iliac vein system.   Complete gross resection.   Stage IIB    3/7/2020 - Biological/Targeted/Hormone Therapy   Letrozole 2.5 daily. At first month,  nausea is minimal. Hot flashes are ok. Working from home and staying active. 2-3 mi walking 5 days. CrossFit 40 min three times weekly. Baseline bone mineral density is good, no osteopenia.    3/2023: Letrozole stopped due to side effects     3/20/23: CT CAP    COMPARISON:  Multiple priors, most recently CT 06/11/2022     FINDINGS:     Prior hysterectomy, bilateral salpingo-oophorectomy, right iliac vein resection, and partial   vaginectomy for endometrial stromal sarcoma. Postsurgical changes and surgical clips in the pelvis.     There is tethering of the right distal ureter in the pelvis with mild associated urothelial   thickening and enhancement, for example series 1, image 111. Mild upstream hydroureter without   hydronephrosis. The contralateral ureter is normal. The bladder is within expected limits. No   recurrent soft tissue in the pelvis or along the iliac veins. No new adenopathy or free fluid.     Multiple hypoattenuating lesions in the liver are stable dating back to 01/22/2020 and likely   represent benign cysts. No suspicious hepatic masses. Non-cirrhotic morphology of the liver with   patent portal and hepatic veins.     The gallbladder and biliary tree are normal. No suspicious pancreatic masses. The adrenal glands and   kidneys are normal. Normal spleen with 2 small splenules. The bowel is within expected limits.     Tiny fat-containing umbilical hernia. No aggressive osseous lesions.     A CT chest was performed at the same time which will be reported separately.     IMPRESSION:   1.  No findings of recurrence in the abdomen or pelvis.   2.  A CT chest was performed at the same time which will be reported separately.    CT chest:  FINDINGS:   This examination was performed in conjunction with a CT of the abdomen, which will be reported   separately.     No suspicious lung nodule. No bulky thoracic lymphadenopathy. No suspicious osseous lesion. No   pleural effusion. Interval left breast implant  rupture. Right breast implant appears similar since   prior exam.     IMPRESSION:   1.  No thoracic metastatic disease.   2.  Interval left breast implant rupture.    11/2023: DXA scan  NORMAL. Bone mineral density measurements are within normal limits using T score.      Today:  Off work since 5/23 due to depression and anxiety, gaining weight, looking for long term disability, was seen in ED for tremors-told conversion disorder common with anxiety/depression-having physical symptoms. Multiple things contributing to her stress and anxiety. Seeing a therapist. Does not trust her body or her doctors-constantly questions what being told by physicians. On sertraline and Wellbutrin. Has a psychiatrist. Has therapy appointment today and 2 mos from now with psychiatrist. Intermittent right groin discomfort, not constant-told musculoskeletal. Uses ice and heat. Has hot flashes. Looking for long term disability and social security-denied once.     Review of Systems:    I have reviewed the pertinent positive findings in the review of symptoms with the patient.    Past Medical History:     IBS: diarrhea and constipation  anxiety    Past Surgical History:  See above    Health Maintenance:  Health Maintenance Due   Topic Date Due    YEARLY PREVENTIVE VISIT  Never done    ADVANCE CARE PLANNING  Never done    DEPRESSION ACTION PLAN  Never done    MAMMO SCREENING  Never done    HEPATITIS B IMMUNIZATION (1 of 3 - 3-dose series) Never done    Pneumococcal Vaccine: Pediatrics (0 to 5 Years) and At-Risk Patients (6 to 64 Years) (1 - PCV) Never done    COVID-19 Vaccine (3 - Pfizer risk series) 11/24/2021    INFLUENZA VACCINE (1) Never done          She has had a history of abnormal Pap smears.  ASCUS    Last Mammogram: 2022             Result: normal      She has not had a history of abnormal mammograms.    Last Colonoscopy: 2021              Result: abnormal: polyps repeat in 5 years.     TSH 3.09 10/2020         Current Medications:      has a current medication list which includes the following prescription(s): bupropion, cyclobenzaprine, lorazepam, meclizine, mirabegron, ondansetron, and sertraline.       Allergies:     Ciprofloxacin: dizzy       Social History:     Social History     Tobacco Use    Smoking status: Never     Passive exposure: Past    Smokeless tobacco: Never   Substance Use Topics    Alcohol use: Yes     Comment: occasionally       History   Drug Use Not on file       No tobacco, no drugs, occ ETOH    Family History:     The patient's family history is notable for the following:    No breast, colon or ovarian cancer    Physical Exam:     /71   Pulse 68   Temp 97.9  F (36.6  C) (Oral)   Resp 18   Wt 83.4 kg (183 lb 12.8 oz)   LMP  (LMP Unknown)   SpO2 96%   BMI 29.67 kg/m    Body mass index is 29.67 kg/m .    General Appearance: healthy and alert, appears anxious, sweaty with hot flashes     HEENT:   , no palpable nodules or masses        Cardiovascular: regular rate and rhythm, no gallops, rubs or murmurs     Respiratory: lungs clear, no rales, rhonchi or wheezes, normal diaphragmatic excursion    Musculoskeletal: extremities non tender and without edema    Neurological: normal gait, no gross defects     Psychiatric: appropriate mood and affect                               Hematological: normal cervical, supraclavicular and inguinal lymph nodes     Gastrointestinal:       abdomen soft, non-tender, non-distended, no organomegaly or masses, vertical midline incision well healed. Palpation of right lower quadrant can reproduce pain with palpation to medial aspect of Ant superior iliac spine.    Genitourinary: External genitalia and urethral meatus appears normal.  Vagina is smooth without nodularity or masses.  Cervix absent.  Bimanual exam reveal no masses, nodularity or fullness.         Assessment:    Orquidea Diggs is a 47 year old woman with a diagnosis of ESS with history of letrozole x 3 years. Recently  stopped in 3/2023 due to menopausal side effects. Having significant anxiety, depression now-multi-factorial in nature.   -Stress/urge incontinence-following with Dr. Tavares now.       Plan:     1.)    ESS: Recently stopped letrozole. Anxiety issues and hot flashes, discussed celexa or restarting Effexor. Patient would benefit from NP survivorship visit and Dr. Dobson consult. Based on imaging JOSR. I reviewed these today with the patient.  -Recommend q 6 mos visits and yearly CTCAP-due 3/2024  -Right groin discomfort: encouraged 600 mg ibuprofen x 3 days with foot to see if improvement. Suspect muscle strain discomfort. Intermittent in nature, still present on and off.   -NP for survivorship visit   -Primary care MD set up with one looking at seeing a new person in January.   -DXA scan for bone density in 11/23 normal  -Mental health: encouraged patient to follow up with local [facility] health clinic for counseling and possibly for initiation of anti-depressants. Patient admits she has done this and will follow up with them.   -RTC in 6 months with me. CT scan in 3/2024  -Discussed hot flashes and that I do not recommend HRT due to the LGESS and the hormonal responsiveness to this tumor. Discussed Fezolinetant and NK3 receptor antagonist for hot flashes, she will discuss with her psychiatrist as perhaps would help with some of the anxiety she is experiencing.      2.) Genetic risk factors were assessed and the patient does not meet the qualifications for a referral.      3.) Labs and/or tests ordered include:  None today     4.) Health maintenance issues addressed today include colonoscopy        Thank you for allowing us to participate in the care of your patient.         Sincerely,    Sheba Chowdary MD    Department of Ob/Gyn and Women's Health  Division of Gynecologic Oncology  [facility]  [phone]    Of a 30 minute appointment, more than 50% was spent in  counseling the patient.    CC  Patient Care Team:  Katlin Garcia MD as PCP - General

## 2023-11-17 NOTE — PATIENT INSTRUCTIONS
It was a pleasure seeing you in clinic today-please reach out if there are any further questions that arise following today's visit.  During business hours, you may reach me at [phone].  For urgent/emergent questions after business hours, you may reach the on-call Gynecologic Oncology Resident through the [facility]  at [phone].    All normal test results are usually communicated via letter or [employer] message.  Abnormal results (those that require a change in the previously discussed plan of care) are usually communicated via a phone call.    I recommend signing up for [facility] access if you have not already done so.  This allows you online access to your lab results and also helps you communicate efficiently with your clinic should any questions arise in your care.    Follow up appointment in 6 months with MD scheduling will call you to help make an appointment  referral to radiology for ct scan prior to md appointment, scheduling will call you to help make an appointment      Dr Epi Goldberg RN  Phone:  [phone]  Fax:  [phone]

## 2023-11-20 ENCOUNTER — TRANSFERRED RECORDS (OUTPATIENT)
Dept: HEALTH INFORMATION MANAGEMENT | Facility: CLINIC | Age: 48
End: 2023-11-20

## 2023-11-29 ENCOUNTER — THERAPY VISIT (OUTPATIENT)
Dept: PHYSICAL THERAPY | Facility: CLINIC | Age: 48
End: 2023-11-29
Attending: UROLOGY
Payer: COMMERCIAL

## 2023-11-29 DIAGNOSIS — M62.89 HIGH-TONE PELVIC FLOOR DYSFUNCTION: ICD-10-CM

## 2023-11-29 DIAGNOSIS — N39.46 MIXED STRESS AND URGE URINARY INCONTINENCE: ICD-10-CM

## 2023-11-29 DIAGNOSIS — M79.18 MYALGIA OF PELVIC FLOOR: ICD-10-CM

## 2023-11-29 DIAGNOSIS — N39.46 MIXED INCONTINENCE: Primary | ICD-10-CM

## 2023-11-29 PROCEDURE — 97535 SELF CARE MNGMENT TRAINING: CPT | Mod: GP | Performed by: PHYSICAL THERAPIST

## 2023-11-29 PROCEDURE — 97161 PT EVAL LOW COMPLEX 20 MIN: CPT | Mod: GP | Performed by: PHYSICAL THERAPIST

## 2023-11-29 NOTE — PROGRESS NOTES
PHYSICAL THERAPY EVALUATION  Type of Visit: Evaluation    See electronic medical record for Abuse and Falls Screening details.    Subjective       Pt reports her incontinence started with stress incontinence, now having it with walking, pt also reporting some abdominal pressure at times. Pt reports she sometimes feels like she doesn't empty her bladder but PVR was normal. Pt reports she voids often small amounts very frequentlu about every hour. Leaking daily, 1+ times. Also struggles with urgency, especially with overnight voiding, only getting up once per night. Pt has pinched nerve in her back which gives her some R flank pain, also might have nerve pain from a surgery in 2020 to remove a tumor. Has a large abdominal incision from this. Sometimes has pain with intercourse at initial penetration, doesn't use lubricant, unable to be on hormonal medications/topical estrogen per oncologist. Pt has IBS, tends to lean towards constipation, sometimes having bowel movement 2-3x per week, type 1-2 on bristol chart.  Pt started Myrbetriq about a month ago, hasn't been helping yet.     Presenting condition or subjective complaint: Overactive bladder and urine leakage  Date of onset: 10/26/23 (MD order)    Relevant medical history: Bladder or bowel problems; Cancer; Depression; Dizziness; Hearing problems; Incontinence; Mental Illness; Migraines or headaches; Overweight   Dates & types of surgery: Tubal ligation 2001, breast augmentation 2007, hysterectomy 2019, tumor removal 2020    Prior diagnostic imaging/testing results: MRI; CT scan; X-ray; Bone scan     Prior therapy history for the same diagnosis, illness or injury: No        Living Environment  Social support: With a significant other or spouse   Type of home: House   Stairs to enter the home: Yes       Ramp: No   Stairs inside the home: Yes       Help at home: None  Equipment owned:       Employment: No    Hobbies/Interests:      Patient goals for therapy: Wear  something other than black leggings    Pain assessment: Pain present     Objective      PELVIC EVALUATION  ADDITIONAL HISTORY:  Sex assigned at birth: Female  Gender identity: Female    Pronouns: She/Her Hers      Bladder History:  Feels bladder filling: No  Triggers for feeling of inability to wait to go to the bathroom: No    How long can you wait to urinate: Depends  Gets up at night to urinate: Yes    Can stop the flow of urine when urinating: Sometimes  Volume of urine usually released: Small   Other issues: Slow or hesitant urine stream; Trouble emptying bladder completely; Dribbling after urinating  Number of bladder infections in last 12 months:    Fluid intake per day: 60-80      Medications taken for bladder: Yes Myrbetriq   Activities causing urine leak: Cough; Sneeze; Jump; Run; Hurrying to the bathroom due to a strong urge to urinate (pee); Other activities Walking and random  Amount of urine typically leaked: Enough to have to bathe and change clothes  Pads used to help with leaking: No        Bowel History:  Frequency of bowel movement: Very inconsistent  Consistency of stool: Hard    Ignores the urge to defecate: No  Other bowel issues: Pain when pooping; Straining to have bowel movement  Length of time spent trying to have a bowel movement: Depends    Sexual Function History:  Sexual orientation: Straight    Sexually active: Yes  Lubrication used: No No  Pelvic pain: Walking; Standing; Sitting; Pelvic exams    Pain or difficulty with orgasms/erection/ejaculation: No    State of menopause: During menopause (I am in menopause now)  Hormone medications: No      Are you currently pregnant: No, Number of previous pregnancies: 3, Number of deliveries: 3, If you have delivered before, did you have any of these issues during delivery: Tearing; Episiotomy; Vaginal delivery, Have you been diagnosed with pelvic prolapse or abdominal separation: No, Do you get regular exercise: Yes, I do this type of exercise:  Walking, Crossfit, Have you tried pelvic floor strengthening exercises for 4 weeks: No, Do you have any history of trauma that is relevant to your care that you d like to share: No    Discussed reason for referral regarding pelvic health needs and external/internal pelvic floor muscle examination with patient/guardian.  Opportunity provided to ask questions and verbal consent for assessment and intervention was given.    EXAM:   Pt alerting therapist of need to leave 40 minutes early before exam could be completed. Educated on pelvic exam at next visit and pt in agreement with completion.     Assessment & Plan   CLINICAL IMPRESSIONS  Medical Diagnosis: mixed urinary incontinence    Treatment Diagnosis: mixed urinary incontinence   Impression/Assessment: Patient is a 47 year old female with urinary incontinence complaints.  The following significant findings have been identified: Pain, Decreased ROM/flexibility, Decreased strength, Impaired muscle performance, and Decreased activity tolerance. These impairments interfere with their ability to perform self care tasks, recreational activities, household mobility, and community mobility as compared to previous level of function.     Clinical Decision Making (Complexity):  Clinical Presentation: Stable/Uncomplicated  Clinical Presentation Rationale: based on medical and personal factors listed in PT evaluation  Clinical Decision Making (Complexity): Low complexity    PLAN OF CARE  Treatment Interventions:  Interventions: Manual Therapy, Neuromuscular Re-education, Therapeutic Activity, Therapeutic Exercise, Self-Care/Home Management    Long Term Goals     PT Goal 1  Goal Identifier: Urinary Leaking  Goal Description: pt will reduce urinary leaking ot 1+ per day to 1x per week or less  Rationale: to maximize safety and independence with performance of ADLs and functional tasks  Target Date: 02/07/24  PT Goal 2  Goal Identifier: Bowels  Goal Description: pt will have bowel  movements 3+ times per week of Type 3-4 constistency  Rationale: to maximize safety and independence with performance of ADLs and functional tasks  Target Date: 02/07/24      Frequency of Treatment: once per week  Duration of Treatment: 10 weeks    Recommended Referrals to Other Professionals:  NA  Education Assessment:   Learner/Method: Patient  Education Comments: Pt educated on PT role and plan of care    Risks and benefits of evaluation/treatment have been explained.   Patient/Family/caregiver agrees with Plan of Care.     Evaluation Time:     PT Eval, Low Complexity Minutes (20363): 10     Signing Clinician: Dulce Maria Barrientos PT

## 2023-12-02 ENCOUNTER — HEALTH MAINTENANCE LETTER (OUTPATIENT)
Age: 48
End: 2023-12-02

## 2023-12-06 ENCOUNTER — THERAPY VISIT (OUTPATIENT)
Dept: PHYSICAL THERAPY | Facility: CLINIC | Age: 48
End: 2023-12-06
Attending: UROLOGY
Payer: COMMERCIAL

## 2023-12-06 DIAGNOSIS — N39.46 MIXED INCONTINENCE: Primary | ICD-10-CM

## 2023-12-06 PROCEDURE — 97140 MANUAL THERAPY 1/> REGIONS: CPT | Mod: GP | Performed by: PHYSICAL THERAPIST

## 2023-12-06 PROCEDURE — 97535 SELF CARE MNGMENT TRAINING: CPT | Mod: GP | Performed by: PHYSICAL THERAPIST

## 2023-12-13 ENCOUNTER — THERAPY VISIT (OUTPATIENT)
Dept: PHYSICAL THERAPY | Facility: CLINIC | Age: 48
End: 2023-12-13
Attending: UROLOGY
Payer: COMMERCIAL

## 2023-12-13 DIAGNOSIS — N39.46 MIXED INCONTINENCE: Primary | ICD-10-CM

## 2023-12-13 PROCEDURE — 97140 MANUAL THERAPY 1/> REGIONS: CPT | Mod: GP | Performed by: PHYSICAL THERAPIST

## 2023-12-13 PROCEDURE — 97110 THERAPEUTIC EXERCISES: CPT | Mod: GP | Performed by: PHYSICAL THERAPIST

## 2023-12-15 ENCOUNTER — TRANSFERRED RECORDS (OUTPATIENT)
Dept: HEALTH INFORMATION MANAGEMENT | Facility: CLINIC | Age: 48
End: 2023-12-15

## 2023-12-20 ENCOUNTER — PRE VISIT (OUTPATIENT)
Dept: OTOLARYNGOLOGY | Facility: CLINIC | Age: 48
End: 2023-12-20

## 2024-01-11 ENCOUNTER — HOSPITAL ENCOUNTER (OUTPATIENT)
Dept: MAMMOGRAPHY | Facility: CLINIC | Age: 49
Discharge: HOME | End: 2024-01-11
Payer: COMMERCIAL

## 2024-01-11 DIAGNOSIS — Z12.31 VISIT FOR SCREENING MAMMOGRAM: ICD-10-CM

## 2024-01-11 PROCEDURE — 77063 BREAST TOMOSYNTHESIS BI: CPT

## 2024-01-18 PROBLEM — N39.46 MIXED INCONTINENCE: Status: RESOLVED | Noted: 2023-11-29 | Resolved: 2024-01-18

## 2024-01-18 NOTE — PROGRESS NOTES
DISCHARGE  Reason for Discharge: Patient has failed to schedule further appointments.    Equipment Issued: none    Discharge Plan: Patient to continue home program.    Referring Provider:  Basia Tavares         12/13/23 0500   Appointment Info   Signing clinician's name / credentials Dulce Maria Barrientos, PT, DPT   Total/Authorized Visits PT E&T   Visits Used 3   Medical Diagnosis mixed urinary incontinence   PT Tx Diagnosis mixed urinary incontinence   Quick Adds Pelvic Consent   Progress Note/Certification   Onset of illness/injury or Date of Surgery 10/26/23  (MD order)   Therapy Frequency once per week   Predicted Duration 10 weeks   Progress Note Completed Date 11/29/23   GOALS   PT Goals 2   PT Goal 1   Goal Identifier Urinary Leaking   Goal Description pt will reduce urinary leaking from 1+ per day to 1x per week or less   Rationale to maximize safety and independence with performance of ADLs and functional tasks   Target Date 02/07/24   PT Goal 2   Goal Identifier Bowels   Goal Description pt will have bowel movements 3+ times per week of Type 3-4 constistency   Rationale to maximize safety and independence with performance of ADLs and functional tasks   Target Date 02/07/24   Subjective Report   Subjective Report pt reports a few shooting pains after last visit but pain was well controlled. Working on urge supression. Still leaking and wearing pads on ocassion. Typically leaking at least daily. Pt reports bowels aren't going as well this week, might start taking metamucil   Objective Measures   Objective Measures Objective Measure 1   Objective Measure 1   Details abdominal exam completed, mod tension throughout, poor ability to engage abdominals   Treatment Interventions (PT)   Interventions Self Care/Home Management;Therapeutic Procedure/Exercise;Manual Therapy   Therapeutic Procedure/Exercise   Therapeutic Procedures: strength, endurance, ROM, flexibillity minutes (63654) 10   PTRx Ther Proc 1  Adductor Stretch   PTRx Ther Proc 1 - Details cues for form 30 second holds   PTRx Ther Proc 2 Abdominal Brace Transverse Abdominis   PTRx Ther Proc 2 - Details cues for form tactile and verbal. Pt needing reminders to exhale able to complete but not consistently.    Therapeutic Activity   PTRx Ther Act 1 Normal Bowel Habits   PTRx Ther Act 1 - Details No Notes   PTRx Ther Act 2 Toileting Position   PTRx Ther Act 2 - Details No Notes   Manual Therapy   Manual Therapy: Mobilization, MFR, MLD, friction massage minutes (57609) 25   Manual Therapy 1 MFR   Manual Therapy 1 - Details to internal PF at LA and OI R>L, tender for pt but slight improvements with therapy. Also completed to abdominal wall R>L side, some tenderness   Self Care/home Management   Self Care Self Care 2;Self Care 3   Intervention (Other)   PTRx Other  1 General Bladder Information   PTRx Other 1 - Details No Notes   PTRx Other  2 Urge Incontinence Suppression Techniques   PTRx Other 2 - Details No Notes   Education   Learner/Method Patient   Education Comments Pt educated on PT role and plan of care   Plan   Home program see PTRx print   Plan for next session progress internal releases, low back/ hip exam, work toward kegels when able. 360  Breathing   Comments   Pelvic Health Informed Consent Statement Discussed with patient/guardian reason for referral regarding pelvic health needs and external/internal pelvic floor muscle examination.  Opportunity provided to ask questions and verbal consent for assessment and intervention was given.   Total Session Time   Timed Code Treatment Minutes 35   Total Treatment Time (sum of timed and untimed services) 35

## 2024-01-25 ENCOUNTER — HOSPITAL ENCOUNTER (OUTPATIENT)
Dept: MAMMOGRAPHY | Facility: CLINIC | Age: 49
Discharge: HOME | End: 2024-01-25
Attending: FAMILY MEDICINE | Admitting: FAMILY MEDICINE
Payer: COMMERCIAL

## 2024-01-25 DIAGNOSIS — R92.1 BREAST CALCIFICATIONS: ICD-10-CM

## 2024-01-25 PROCEDURE — 77065 DX MAMMO INCL CAD UNI: CPT | Mod: LT

## 2024-01-25 NOTE — LETTER
Orquidea Diggs  [address] [facility] [id number]            January 26, 2024      Date of Exam: 1/25/24    Dear Orquidea:    Thank you for your recent visit.    Breast Imaging Result: Your recent breast imaging examination on 1/25/24 showed a finding that requires additional imaging evaluation. Most such findings are benign (not cancer). If you have already been called back for these tests, please disregard this letter. If not, please call  [phone] to schedule an appointment for these tests if you have not already done so.    A report of your breast imaging results was sent to: [facility] Moreno    Your breast tissue is not dense:  Breast tissue can be either dense or not dense. Dense tissue makes it harder to find breast cancer on a mammogram and also raises the risk of developing breast cancer.  Your breast tissue is not dense. Talk to your healthcare provider about breast density, risks for breast cancer, and your individual situation.    Your breast imaging will become part of your medical file here at [facility] for at least 10 years. You are responsible for informing any new health care team or breast imaging facility of the date and location of this examination.    We appreciate the opportunity to participate in your health care.    Sincerely,  Ruthie Neely DO  [facility]

## 2024-01-25 NOTE — PROGRESS NOTES
Radiologist, Dr Ruthie Neely, recommends stereotactic guided left breast biopsy.     While pt was at [facility], I scheduled pt at [facility], [address] [place] [address]. [phone]. Appt: Friday, 1/26/24, arrival at 12:30pm for 12:45pm appt. I gave patient the address and phone number to above location.     I reviewed the procedure and the written pre and post breast biopsy patient handouts with patient, and I gave patient the written pre and post biopsy patient handouts to take home.     Pt verbalizes understanding of procedure and appt location, date, and time. Calls welcomed.    Divine Carrero, RN, BSN, [facility]  [facility]

## 2024-01-26 ENCOUNTER — ANCILLARY PROCEDURE (OUTPATIENT)
Dept: MAMMOGRAPHY | Facility: CLINIC | Age: 49
End: 2024-01-26
Attending: FAMILY MEDICINE
Payer: COMMERCIAL

## 2024-01-26 DIAGNOSIS — R92.1 BREAST CALCIFICATIONS: ICD-10-CM

## 2024-01-26 PROCEDURE — 19081 BX BREAST 1ST LESION STRTCTC: CPT | Mod: LT

## 2024-01-26 PROCEDURE — 88305 TISSUE EXAM BY PATHOLOGIST: CPT | Mod: TC | Performed by: FAMILY MEDICINE

## 2024-01-26 PROCEDURE — 88305 TISSUE EXAM BY PATHOLOGIST: CPT | Mod: 26 | Performed by: PATHOLOGY

## 2024-01-26 PROCEDURE — 250N000009 HC RX 250: Performed by: FAMILY MEDICINE

## 2024-01-26 RX ORDER — LIDOCAINE HYDROCHLORIDE AND EPINEPHRINE 10; 10 MG/ML; UG/ML
10 INJECTION, SOLUTION INFILTRATION; PERINEURAL ONCE
Status: COMPLETED | OUTPATIENT
Start: 2024-01-26 | End: 2024-01-26

## 2024-01-26 RX ADMIN — LIDOCAINE HYDROCHLORIDE,EPINEPHRINE BITARTRATE 10 ML: 10; .01 INJECTION, SOLUTION INFILTRATION; PERINEURAL at 12:57

## 2024-01-26 RX ADMIN — LIDOCAINE HYDROCHLORIDE 10 ML: 10 INJECTION, SOLUTION INFILTRATION; PERINEURAL at 12:57

## 2024-01-26 NOTE — DISCHARGE INSTRUCTIONS
After Your Breast Biopsy    Bleeding, bruising, and pain  Breast tenderness and some bruising is normal and may last several days. You may wear your bra overnight to support the breast.  You may use an ice pack for pain. Place it over the area for 15 to 20 minutes, several times a day.  You may take over-the-counter pain medicine:  On the day of the biopsy, we recommend Tylenol (acetaminophen) because it does not raise your risk of bleeding.  The next day, you may take an anti-inflammatory medicine (aspirin, ibuprofen, Motrin, Aleve, Advil), unless your doctor tells you not to.  Bandages and showering  Keep your bandage in place until tomorrow morning. Don't get it wet.  If you have small pieces of tape on the skin, leave them in place. They will fall off on their own, or you can remove them after 5 days.  You may shower the next morning after your biopsy.  Activity  No heavy activity (no running, no gym workouts, no lifting, no vacuuming, etc.) on the day of your biopsy.  You may go back to normal activity the next day. But limit what you do if you still have pain or discomfort.  Infection  Infection is rare. Signs of infection include:  Fever (including sweats and chills)  Redness  Pain that gets worse  Fluid draining from the biopsy site  Biopsy results  Results may take up to 5 business days.  A nurse or doctor from the Breast Center will call with your results. The system sends the results to the doctor that ordered your biopsy & MyChart automatically.     If you have not gotten your results in 5 days, please call the Breast Center.  Call the Breast Center with questions or if:   You have bleeding that lasts more than 20 minutes.  You have pain that you can't control.  You have signs of infection (fever, sweats, chills, redness, increasing pain, or drainage).  After hours, please call the doctor who ordered your biopsy.     Breast Center Nurse  [phone]    For informational purposes only. Not to replace the  advice of your health care provider. Copyright   2010 [facility]. All rights reserved. Clinically reviewed by Ruthie Horne, Director, [facility]. [employer] [id number] - REV 08/23.

## 2024-01-30 LAB
PATH REPORT.COMMENTS IMP SPEC: NORMAL
PATH REPORT.FINAL DX SPEC: NORMAL
PATH REPORT.GROSS SPEC: NORMAL
PATH REPORT.MICROSCOPIC SPEC OTHER STN: NORMAL
PATH REPORT.RELEVANT HX SPEC: NORMAL
PHOTO IMAGE: NORMAL

## 2024-02-19 ENCOUNTER — OFFICE VISIT (OUTPATIENT)
Dept: FAMILY MEDICINE | Facility: CLINIC | Age: 49
End: 2024-02-19
Payer: COMMERCIAL

## 2024-02-19 VITALS
HEART RATE: 54 BPM | BODY MASS INDEX: 32.11 KG/M2 | WEIGHT: 188.1 LBS | DIASTOLIC BLOOD PRESSURE: 80 MMHG | SYSTOLIC BLOOD PRESSURE: 102 MMHG | OXYGEN SATURATION: 98 % | HEIGHT: 64 IN | RESPIRATION RATE: 18 BRPM | TEMPERATURE: 98.1 F

## 2024-02-19 DIAGNOSIS — C54.1 ENDOMETRIAL STROMAL SARCOMA (H): ICD-10-CM

## 2024-02-19 DIAGNOSIS — Z98.82 HISTORY OF BREAST AUGMENTATION: ICD-10-CM

## 2024-02-19 DIAGNOSIS — Z90.710 HISTORY OF HYSTERECTOMY FOR CANCER: ICD-10-CM

## 2024-02-19 DIAGNOSIS — Z00.00 ROUTINE GENERAL MEDICAL EXAMINATION AT A HEALTH CARE FACILITY: Primary | ICD-10-CM

## 2024-02-19 DIAGNOSIS — R53.83 FATIGUE, UNSPECIFIED TYPE: ICD-10-CM

## 2024-02-19 DIAGNOSIS — Z00.00 ENCOUNTER FOR MEDICAL EXAMINATION TO ESTABLISH CARE: ICD-10-CM

## 2024-02-19 DIAGNOSIS — M54.50 CHRONIC BILATERAL LOW BACK PAIN WITHOUT SCIATICA: ICD-10-CM

## 2024-02-19 DIAGNOSIS — R42 VERTIGO: ICD-10-CM

## 2024-02-19 DIAGNOSIS — R79.89 ELEVATED SERUM CREATININE: ICD-10-CM

## 2024-02-19 DIAGNOSIS — G89.29 CHRONIC BILATERAL LOW BACK PAIN WITHOUT SCIATICA: ICD-10-CM

## 2024-02-19 DIAGNOSIS — F32.1 MAJOR DEPRESSIVE DISORDER, SINGLE EPISODE, MODERATE (H): ICD-10-CM

## 2024-02-19 PROBLEM — I82.409 DEEP VEIN THROMBOSIS (DVT) OF LOWER EXTREMITY (H): Status: RESOLVED | Noted: 2020-01-24 | Resolved: 2024-02-19

## 2024-02-19 PROBLEM — R87.610 ATYPICAL SQUAMOUS CELLS OF UNDETERMINED SIGNIFICANCE (ASCUS) ON PAPANICOLAOU SMEAR OF CERVIX: Status: RESOLVED | Noted: 2019-10-23 | Resolved: 2024-02-19

## 2024-02-19 PROBLEM — N13.30 HYDRONEPHROSIS: Status: RESOLVED | Noted: 2020-03-26 | Resolved: 2024-02-19

## 2024-02-19 LAB
ALBUMIN SERPL BCG-MCNC: 4.6 G/DL (ref 3.5–5.2)
ALP SERPL-CCNC: 107 U/L (ref 40–150)
ALT SERPL W P-5'-P-CCNC: 58 U/L (ref 0–50)
ANION GAP SERPL CALCULATED.3IONS-SCNC: 9 MMOL/L (ref 7–15)
AST SERPL W P-5'-P-CCNC: 39 U/L (ref 0–45)
BILIRUB SERPL-MCNC: 0.2 MG/DL
BUN SERPL-MCNC: 11.5 MG/DL (ref 6–20)
CALCIUM SERPL-MCNC: 9.8 MG/DL (ref 8.6–10)
CHLORIDE SERPL-SCNC: 104 MMOL/L (ref 98–107)
CREAT SERPL-MCNC: 1.01 MG/DL (ref 0.51–0.95)
DEPRECATED HCO3 PLAS-SCNC: 28 MMOL/L (ref 22–29)
EGFRCR SERPLBLD CKD-EPI 2021: 68 ML/MIN/1.73M2
ERYTHROCYTE [DISTWIDTH] IN BLOOD BY AUTOMATED COUNT: 11.6 % (ref 10–15)
GLUCOSE SERPL-MCNC: 104 MG/DL (ref 70–99)
HBA1C MFR BLD: 5.6 % (ref 0–5.6)
HCT VFR BLD AUTO: 44.2 % (ref 35–47)
HGB BLD-MCNC: 14.6 G/DL (ref 11.7–15.7)
MCH RBC QN AUTO: 29.6 PG (ref 26.5–33)
MCHC RBC AUTO-ENTMCNC: 33 G/DL (ref 31.5–36.5)
MCV RBC AUTO: 90 FL (ref 78–100)
PLATELET # BLD AUTO: 233 10E3/UL (ref 150–450)
POTASSIUM SERPL-SCNC: 4.6 MMOL/L (ref 3.4–5.3)
PROT SERPL-MCNC: 7.4 G/DL (ref 6.4–8.3)
RBC # BLD AUTO: 4.94 10E6/UL (ref 3.8–5.2)
SODIUM SERPL-SCNC: 141 MMOL/L (ref 135–145)
TSH SERPL DL<=0.005 MIU/L-ACNC: 2.29 UIU/ML (ref 0.3–4.2)
WBC # BLD AUTO: 5.7 10E3/UL (ref 4–11)

## 2024-02-19 PROCEDURE — 99214 OFFICE O/P EST MOD 30 MIN: CPT | Mod: 25 | Performed by: NURSE PRACTITIONER

## 2024-02-19 PROCEDURE — 83036 HEMOGLOBIN GLYCOSYLATED A1C: CPT | Performed by: NURSE PRACTITIONER

## 2024-02-19 PROCEDURE — 84443 ASSAY THYROID STIM HORMONE: CPT | Performed by: NURSE PRACTITIONER

## 2024-02-19 PROCEDURE — 85027 COMPLETE CBC AUTOMATED: CPT | Performed by: NURSE PRACTITIONER

## 2024-02-19 PROCEDURE — 36415 COLL VENOUS BLD VENIPUNCTURE: CPT | Performed by: NURSE PRACTITIONER

## 2024-02-19 PROCEDURE — 99396 PREV VISIT EST AGE 40-64: CPT | Performed by: NURSE PRACTITIONER

## 2024-02-19 PROCEDURE — 80053 COMPREHEN METABOLIC PANEL: CPT | Performed by: NURSE PRACTITIONER

## 2024-02-19 RX ORDER — ONDANSETRON 4 MG/1
4 TABLET, FILM COATED ORAL EVERY 8 HOURS PRN
Qty: 30 TABLET | Refills: 4 | Status: SHIPPED | OUTPATIENT
Start: 2024-02-19 | End: 2024-02-28

## 2024-02-19 RX ORDER — CYCLOBENZAPRINE HCL 10 MG
10 TABLET ORAL
Qty: 30 TABLET | Refills: 3 | Status: SHIPPED | OUTPATIENT
Start: 2024-02-19

## 2024-02-19 RX ORDER — MECLIZINE HYDROCHLORIDE 25 MG/1
25 TABLET ORAL 3 TIMES DAILY PRN
Qty: 20 TABLET | Refills: 1 | Status: SHIPPED | OUTPATIENT
Start: 2024-02-19

## 2024-02-19 SDOH — HEALTH STABILITY: PHYSICAL HEALTH: ON AVERAGE, HOW MANY DAYS PER WEEK DO YOU ENGAGE IN MODERATE TO STRENUOUS EXERCISE (LIKE A BRISK WALK)?: 5 DAYS

## 2024-02-19 SDOH — HEALTH STABILITY: PHYSICAL HEALTH: ON AVERAGE, HOW MANY MINUTES DO YOU ENGAGE IN EXERCISE AT THIS LEVEL?: 40 MIN

## 2024-02-19 ASSESSMENT — PATIENT HEALTH QUESTIONNAIRE - PHQ9
SUM OF ALL RESPONSES TO PHQ QUESTIONS 1-9: 13
10. IF YOU CHECKED OFF ANY PROBLEMS, HOW DIFFICULT HAVE THESE PROBLEMS MADE IT FOR YOU TO DO YOUR WORK, TAKE CARE OF THINGS AT HOME, OR GET ALONG WITH OTHER PEOPLE: VERY DIFFICULT
SUM OF ALL RESPONSES TO PHQ QUESTIONS 1-9: 13

## 2024-02-19 ASSESSMENT — SOCIAL DETERMINANTS OF HEALTH (SDOH): HOW OFTEN DO YOU GET TOGETHER WITH FRIENDS OR RELATIVES?: ONCE A WEEK

## 2024-02-19 NOTE — PATIENT INSTRUCTIONS
Preventive Care Advice   This is general advice given by our system to help you stay healthy. However, your care team may have specific advice just for you. Please talk to your care team about your preventive care needs.  Nutrition  Eat 5 or more servings of fruits and vegetables each day.  Try wheat bread, brown rice and whole grain pasta (instead of white bread, rice, and pasta).  Get enough calcium and vitamin D. Check the label on foods and aim for 100% of the RDA (recommended daily allowance).  Lifestyle  Exercise at least 150 minutes each week  (30 minutes a day, 5 days a week).  Do muscle strengthening activities 2 days a week. These help control your weight and prevent disease.  No smoking.  Wear sunscreen to prevent skin cancer.  Have a dental exam and cleaning every 6 months.  Yearly exams  See your health care team every year to talk about:  Any changes in your health.  Any medicines your care team has prescribed.  Preventive care, family planning, and ways to prevent chronic diseases.  Shots (vaccines)   HPV shots (up to age 26), if you've never had them before.  Hepatitis B shots (up to age 59), if you've never had them before.  COVID-19 shot: Get this shot when it's due.  Flu shot: Get a flu shot every year.  Tetanus shot: Get a tetanus shot every 10 years.  Pneumococcal, hepatitis A, and RSV shots: Ask your care team if you need these based on your risk.  Shingles shot (for age 50 and up)  General health tests  Diabetes screening:  Starting at age 35, Get screened for diabetes at least every 3 years.  If you are younger than age 35, ask your care team if you should be screened for diabetes.  Cholesterol test: At age 39, start having a cholesterol test every 5 years, or more often if advised.  Bone density scan (DEXA): At age 50, ask your care team if you should have this scan for osteoporosis (brittle bones).  Hepatitis C: Get tested at least once in your life.  STIs (sexually transmitted  infections)  Before age 24: Ask your care team if you should be screened for STIs.  After age 24: Get screened for STIs if you're at risk. You are at risk for STIs (including HIV) if:  You are sexually active with more than one person.  You don't use condoms every time.  You or a partner was diagnosed with a sexually transmitted infection.  If you are at risk for HIV, ask about PrEP medicine to prevent HIV.  Get tested for HIV at least once in your life, whether you are at risk for HIV or not.  Cancer screening tests  Cervical cancer screening: If you have a cervix, begin getting regular cervical cancer screening tests starting at age 21.  Breast cancer scan (mammogram): If you've ever had breasts, begin having regular mammograms starting at age 40. This is a scan to check for breast cancer.  Colon cancer screening: It is important to start screening for colon cancer at age 45.  Have a colonoscopy test every 10 years (or more often if you're at risk) Or, ask your provider about stool tests like a FIT test every year or Cologuard test every 3 years.  To learn more about your testing options, visit:   https://www.[employer]/832110.pdf.  For help making a decision, visit:   https://bit.ly/io89717.  Prostate cancer screening test: If you have a prostate, ask your care team if a prostate cancer screening test (PSA) at age 55 is right for you.  Lung cancer screening: If you are a current or former smoker ages 50 to 80, ask your care team if ongoing lung cancer screenings are right for you.  For informational purposes only. Not to replace the advice of your health care provider. Copyright   2023 [facility]. All rights reserved. Clinically reviewed by the [facility] Transitions Program. [employer] [id number] - REV 01/24.    Learning About Stress  What is stress?     Stress is your body's response to a hard situation. Your body can have a physical, emotional, or mental response. Stress is a fact of life for  most people, and it affects everyone differently. What causes stress for you may not be stressful for someone else.  A lot of things can cause stress. You may feel stress when you go on a job interview, take a test, or run a race. This kind of short-term stress is normal and even useful. It can help you if you need to work hard or react quickly. For example, stress can help you finish an important job on time.  Long-term stress is caused by ongoing stressful situations or events. Examples of long-term stress include long-term health problems, ongoing problems at work, or conflicts in your family. Long-term stress can harm your health.  How does stress affect your health?  When you are stressed, your body responds as though you are in danger. It makes hormones that speed up your heart, make you breathe faster, and give you a burst of energy. This is called the fight-or-flight stress response. If the stress is over quickly, your body goes back to normal and no harm is done.  But if stress happens too often or lasts too long, it can have bad effects. Long-term stress can make you more likely to get sick, and it can make symptoms of some diseases worse. If you tense up when you are stressed, you may develop neck, shoulder, or low back pain. Stress is linked to high blood pressure and heart disease.  Stress also harms your emotional health. It can make you ricci, tense, or depressed. Your relationships may suffer, and you may not do well at work or school.  What can you do to manage stress?  You can try these things to help manage stress:   Do something active. Exercise or activity can help reduce stress. Walking is a great way to get started. Even everyday activities such as housecleaning or yard work can help.  Try yoga or adama chi. These techniques combine exercise and meditation. You may need some training at first to learn them.  Do something you enjoy. For example, listen to music or go to a movie. Practice your  "hobby or do volunteer work.  Meditate. This can help you relax, because you are not worrying about what happened before or what may happen in the future.  Do guided imagery. Imagine yourself in any setting that helps you feel calm. You can use online videos, books, or a teacher to guide you.  Do breathing exercises. For example:  From a standing position, bend forward from the waist with your knees slightly bent. Let your arms dangle close to the floor.  Breathe in slowly and deeply as you return to a standing position. Roll up slowly and lift your head last.  Hold your breath for just a few seconds in the standing position.  Breathe out slowly and bend forward from the waist.  Let your feelings out. Talk, laugh, cry, and express anger when you need to. Talking with supportive friends or family, a counselor, or a jagjit leader about your feelings is a healthy way to relieve stress. Avoid discussing your feelings with people who make you feel worse.  Write. It may help to write about things that are bothering you. This helps you find out how much stress you feel and what is causing it. When you know this, you can find better ways to cope.  What can you do to prevent stress?  You might try some of these things to help prevent stress:  Manage your time. This helps you find time to do the things you want and need to do.  Get enough sleep. Your body recovers from the stresses of the day while you are sleeping.  Get support. Your family, friends, and community can make a difference in how you experience stress.  Limit your news feed. Avoid or limit time on social media or news that may make you feel stressed.  Do something active. Exercise or activity can help reduce stress. Walking is a great way to get started.  Where can you learn more?  Go to https://www.healthwise.net/patiented  Enter N032 in the search box to learn more about \"Learning About Stress.\"  Current as of: February 26, 2023               Content Version: " 13.8    4250-4977 [employer].   Care instructions adapted under license by your healthcare professional. If you have questions about a medical condition or this instruction, always ask your healthcare professional. [employer] disclaims any warranty or liability for your use of this information.      Learning About Depression Screening  What is depression screening?  Depression screening is a way to see if you have depression symptoms. It may be done by a doctor or counselor. It's often part of a routine checkup. That's because your mental health is just as important as your physical health.  Depression is a mental health condition that affects how you feel, think, and act. You may:  Have less energy.  Lose interest in your daily activities.  Feel sad and grouchy for a long time.  Depression is very common. It affects people of all ages.  Many things can lead to depression. Some people become depressed after they have a stroke or find out they have a major illness like cancer or heart disease. The death of a loved one or a breakup may lead to depression. It can run in families. Most experts believe that a combination of inherited genes and stressful life events can cause it.  What happens during screening?  You may be asked to fill out a form about your depression symptoms. You and the doctor will discuss your answers. The doctor may ask you more questions to learn more about how you think, act, and feel.  What happens after screening?  If you have symptoms of depression, your doctor will talk to you about your options.  Doctors usually treat depression with medicines or counseling. Often, combining the two works best. Many people don't get help because they think that they'll get over the depression on their own. But people with depression may not get better unless they get treatment.  The cause of depression is not well understood. There may be many factors involved. But if you have  "depression, it's not your fault.  A serious symptom of depression is thinking about death or suicide. If you or someone you care about talks about this or about feeling hopeless, get help right away.  It's important to know that depression can be treated. Medicine, counseling, and self-care may help.  Where can you learn more?  Go to https://www.imbookin ([employer]net/patiented  Enter T185 in the search box to learn more about \"Learning About Depression Screening.\"  Current as of: June 25, 2023               Content Version: 13.8    6297-3302 [employer]   Care instructions adapted under license by your healthcare professional. If you have questions about a medical condition or this instruction, always ask your healthcare professional. [employer] disclaims any warranty or liability for your use of this information.      "

## 2024-02-19 NOTE — PROGRESS NOTES
Preventive Care Visit  [facility]  Sheba LawsonraziaaureliaADITYA CNP, Family Medicine  Feb 19, 2024    Assessment & Plan     Routine general medical examination at a health care facility  We discussed healthy lifestyle, nutrition, cardiovascular risk reduction, self care, safety, sunscreen, and timing of cancer screening.  Health maintenance screening and immunizations reviewed with the patient.  Follow up yearly for the annual physical.    No need for further Paps  Up-to-date on mammogram and colonoscopy  DEXA scan completed November 2023    Encounter for medical examination to establish care  Establish care new patient today.  Previously a patient of [facility] and [facility]. Information was pulled from care everywhere to the extent that it was available.  ROIs have been signed from neuro neurology, ENT x 2. Reviewed full surgical, family, medical, social, medication history and presented preventative medicine strategy discussed.  Problem list has been reconciled and updated.     Endometrial stromal sarcoma (H)  History of hysterectomy for cancer  Followed by oncology - Other Endometrial - Every 3 months for 2 years, then every 6 months for an additional 3 years, then annually. Surveillance to include a pelvic and rectal exam with provider visit.  No pap smear indicated.      No longer on letrozole due to side effects    Chronic bilateral low back pain without sciatica  Continue followed by chiropractor and Flexeril as needed.  Refilling today  - cyclobenzaprine (FLEXERIL) 10 MG tablet  Dispense: 30 tablet; Refill: 3    Vertigo  Patient with history of ear pain, vertigo that is accompanied with nausea and vomiting.  Also sometimes postnasal drip and sore throat with this.  Has previously been seen by 2 ENTs, the [facility] though did not engage in any physical therapy there, also seen by neurologist.  AMOS received today and brief HPI done today.  Patient to follow-up with me  to review content from specialist.  Patient should continue to follow-up with ENT and stoppage after migraine treatment for further workup.  And establish an actual correct diagnosis.  Refilling Zofran and meclizine for patient to take as needed  - meclizine (ANTIVERT) 25 MG tablet  Dispense: 20 tablet; Refill: 1  - ondansetron (ZOFRAN) 4 MG tablet  Dispense: 30 tablet; Refill: 4    Fatigue, unspecified type  Elevated serum creatinine  Checking CBC, CMP and TSH.  Patient is breast is concerned about kidney function given her family history of early kidney disease and current medication she is on.  Will also check hemoglobin A1c as patient does have an elevated serum creatinine recorded previously.  - Comprehensive metabolic panel (BMP + Alb, Alk Phos, ALT, AST, Total. Bili, TP)  - CBC with platelets  - TSH with free T4 reflex  - Comprehensive metabolic panel (BMP + Alb, Alk Phos, ALT, AST, Total. Bili, TP)  - CBC with platelets  - TSH with free T4 reflex  - Comprehensive metabolic panel (BMP + Alb, Alk Phos, ALT, AST, Total. Bili, TP)  - Hemoglobin A1c  - Comprehensive metabolic panel (BMP + Alb, Alk Phos, ALT, AST, Total. Bili, TP)  - Hemoglobin A1c    Major depressive disorder, single episode, moderate (H)  Patient followed for all mental health conditions by psychiatrist.  She feels she is stable on Zoloft 75 mg daily, Wellbutrin 150 mg extended release daily as well as Ativan as needed.    Denies active SI            Counseling  Appropriate preventive services were discussed with this patient, including applicable screening as appropriate for fall prevention, nutrition, physical activity, Tobacco-use cessation, weight loss and cognition.  Checklist reviewing preventive services available has been given to the patient.  Reviewed patient's diet, addressing concerns and/or questions.   The patient's PHQ-9 score is consistent with moderate depression. She was provided with information regarding depression.      Patient has been advised of split billing requirements and indicates understanding: Yes        Subjective   Raffi is a 48 year old, presenting for the following:  Physical (Physical, est care, needs help managing chronic care.)        2/19/2024    10:28 AM   Additional Questions   Roomed by LC-LPN   Accompanied by N/A        Health Care Directive  Patient does not have a Health Care Directive or Living Will: Discussed advance care planning with patient; information given to patient to review.    Healthy Habits:     Getting at least 3 servings of Calcium per day:  NO    Bi-annual eye exam:  NO    Dental care twice a year:  Yes    Sleep apnea or symptoms of sleep apnea:  None    Diet:  Regular (no restrictions)    Frequency of exercise:  4-5 days/week    Duration of exercise:  30-45 minutes    Taking medications regularly:  Yes    Barriers to taking medications:  None    Medication side effects:  None    Additional concerns today:  Yes    Other Endometrial - Every 3 months for 2 years, then every 6 months for an additional 3 years, then annually. Surveillance to include a pelvic and rectal exam with provider visit.  No pap smear indicated.      Hx of pressure in ears,throat pain sometimes, and feels fluid drains, virtigo/nausea? Feels brain zaps.   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 Went to the [facility] dizzy and balance clinic - ?semicircular canal dehiscence? Told her to see ENT in [place] - has suggested allergies and no improvement after treatment.  Then ENT Treating her for migraines - engality shot for migraines?    Drinks 80 oz of water    Trying to do crossfit 3 times/week   Fatigue limits her - feeling fatigue all the time.     Still get hot flashes.   Hysterectomy at age 43yo - Bl oophorectomy.     HR 41-54 today - Resting HR 55,     MDD/anxiety/conversion disorder  Janiya MORRIS -  [facility] [place] - wellbutrin 150mg daily XR, ativan for flights - rare, zoloft 75mg daily.  Passive SI.     Back -flexeril PRN (l5?) - chiropractor  - initially rx'd at urgent care - has periodic episodes.     OAB - mirabegron 25mg - sees urology, stress incont?  Not patricia eif this is working. Has follow-up with uro.     Weight gain  Wt Readings from Last 10 Encounters:   02/19/24 85.3 kg (188 lb 1.6 oz)   11/09/23 83.4 kg (183 lb 12.8 oz)   10/26/23 81.6 kg (180 lb)   10/12/23 81.6 kg (180 lb)   05/11/23 80 kg (176 lb 6.4 oz)       Kidney function - father with CKD.    Colon CA - repeat in 7/2026 - hx of polyp   Mammo - UTD - s/p FNA Bio     DEXA - DXA RESULTS 11/1/23  -Lumbar Spine: L1-L4: BMD: 1.218 g/cm2. T-score: 0.3. Z-score: 0.1.   -RIGHT Hip Total: BMD: 1.043 g/cm2. T-score: 0.3. Z-score: 0.4.   -RIGHT Hip Femoral neck: BMD: 1.019 g/cm2. T-score: -0.1. Z-score: 0.3.   -LEFT Hip Total: BMD: 1.048 g/cm2. T-score: 0.3. Z-score: 0.4.   -LEFT Hip Femoral neck: BMD: 1.055 g/cm2. T-score: 0.1. Z-score: 0.6.     Breast augmentation 2007.   Saline implant s/p rupture of left breat - no revision               2/19/2024   General Health   How would you rate your overall physical health? (!) FAIR   Feel stress (tense, anxious, or unable to sleep) Very much   (!) STRESS CONCERN      2/19/2024   Nutrition   Three or more servings of calcium each day? (!) NO   Diet: Regular (no restrictions)   How many servings of fruit and vegetables per day? (!) 0-1   How many sweetened beverages each day? 0-1         2/19/2024   Exercise   Days per week of moderate/strenous exercise 5 days   Average minutes spent exercising at this level 40 min         2/19/2024   Social Factors   Frequency of gathering with friends or relatives Once a week   Worry food won't last until get money to buy more No   Food not last or not have enough money for food? No   Do you have housing?  Yes   Are you worried about losing your housing? No    Lack of transportation? No   Unable to get utilities (heat,electricity)? No         2/19/2024   Dental   Dentist two times every year? Yes         2/19/2024   TB Screening   Were you born outside of [place]?  No       Today's PHQ-9 Score:       2/19/2024     7:14 AM   PHQ-9 SCORE   PHQ-9 Total Score MyChart 13 (Moderate depression)   PHQ-9 Total Score 13         2/19/2024   Substance Use   Alcohol more than 3/day or more than 7/wk No   Do you use any other substances recreationally? No     Social History     Tobacco Use    Smoking status: Never     Passive exposure: Past    Smokeless tobacco: Never   Substance Use Topics    Alcohol use: Yes     Comment: Occasional 2x month    Drug use: Never          Mammogram Screening - Patient under 40 years of age: Routine Mammogram Screening not recommended.         2/19/2024   STI Screening   New sexual partner(s) since last STI/HIV test? No     History of abnormal Pap smear: Pap no longer indicated status post malignant hysterectomy and bilateral oophorectomy        8/23/2019     4:20 PM   PAP / HPV   PAP-ABSTRACT See Scanned Document           This result is from an external source.     The 10-year ASCVD risk score (Elva PRITCHARD, et al., 2019) is: 0.5%    Values used to calculate the score:      Age: 48 years      Sex: Female      Is Non- : No      Diabetic: No      Tobacco smoker: No      Systolic Blood Pressure: 102 mmHg      Is BP treated: No      HDL Cholesterol: 83 mg/dL      Total Cholesterol: 238 mg/dL       Reviewed and updated as needed this visit by Provider        Surg Hx  Fam Hx            Past Medical History:   Diagnosis Date    Arthritis 2020    Joint pain in fingers, ankles, back, wrists, shoukders, etc    Atypical squamous cells of undetermined significance (ASCUS) on Papanicolaou smear of cervix     Formatting of this note might be different from the original.  02/04/2010 ASCUS/HPV Negative.    10/31/2012 ASCUS/HPV Negative    08/23/2019  "ASCUS/HPV Negative   Plan: Pap/HPV due 8/2020    Cancer (H) 12/4/2019    Endometrial Stromal Sarcoma    Depressive disorder 5/2023    Major depressive elisode and anxiety    Hydronephrosis      Past Surgical History:   Procedure Laterality Date    ABDOMEN SURGERY  2019 and 2029    Hysterectomy and tumour removal - BL oopherectomy    BREAST SURGERY  2007    Breast augmentation    COLONOSCOPY  2021    Polyps removed-repeat in 5 years    GENITOURINARY SURGERY  2001    Tibal ligation    HYSTERECTOMY VAGINAL, BILATERAL SALPINGO-OOPHERECTOMY, COMBINED      December 2019- and Feb 2020    HYSTERECTOMY, PAP NO LONGER INDICATED      at 43yo         Review of Systems  Constitutional, HEENT, cardiovascular, pulmonary, gi and gu systems are negative, except as otherwise noted.     Objective    Exam  /80 (BP Location: Right arm, Patient Position: Sitting, Cuff Size: Adult Regular)   Pulse 54   Temp 98.1  F (36.7  C) (Oral)   Resp 18   Ht 1.631 m (5' 4.2\")   Wt 85.3 kg (188 lb 1.6 oz)   LMP  (LMP Unknown)   SpO2 98%   BMI 32.09 kg/m     Estimated body mass index is 32.09 kg/m  as calculated from the following:    Height as of this encounter: 1.631 m (5' 4.2\").    Weight as of this encounter: 85.3 kg (188 lb 1.6 oz).    Physical Exam  GENERAL: alert and no distress  EYES: Eyes grossly normal to inspection, PERRL and conjunctivae and sclerae normal  HENT: ear canals and TM's normal, nose and mouth without ulcers or lesions  NECK: no adenopathy, no asymmetry, masses, or scars  RESP: lungs clear to auscultation - no rales, rhonchi or wheezes  CV: regular rate and rhythm, normal S1 S2, no S3 or S4, no murmur, click or rub, no peripheral edema  ABDOMEN: soft, nontender, no hepatosplenomegaly, no masses and bowel sounds normal  MS: no gross musculoskeletal defects noted, no edema  SKIN: no suspicious lesions or rashes  NEURO: Normal strength and tone, mentation intact and speech normal  PSYCH: mentation appears normal, " affect normal/bright      Signed Electronically by: ADITYA Crain CNP    Answers submitted by the patient for this visit:  Patient Health Questionnaire (Submitted on 2/19/2024)  If you checked off any problems, how difficult have these problems made it for you to do your work, take care of things at home, or get along with other people?: Very difficult  PHQ9 TOTAL SCORE: 13

## 2024-02-21 DIAGNOSIS — N32.81 OAB (OVERACTIVE BLADDER): ICD-10-CM

## 2024-02-21 DIAGNOSIS — N39.46 MIXED STRESS AND URGE URINARY INCONTINENCE: ICD-10-CM

## 2024-02-23 NOTE — RESULT ENCOUNTER NOTE
Your CBC (complete blood count) which looks at your hemoglobin and other blood cell types looks good- no concerns for anemia or infection.    Your glucose control as measured by the HbA1c is normal, no signs of diabetes or prediabetes. This A1c measures your average blood sugar levels over the past 3 months and is not needed to be a fasting test.     Your thyroid function as measured by the TSH was normal.    Your comprehensive metabolic panel does reveal slightly elevated glucose and slightly elevated ALT which is a liver function test.  Additionally your creatinine is pretty consistent with your past lab draw done 4 months ago nothing here to explain your persistent vertigo/fatigue.     I will see you at your follow-up    Please let me know if you have any questions or concerns.    Thank you for trusting us with your care. It was a pleasure seeing you.  ADITYA Crain CNP on 2/23/2024 at 12:39 PM

## 2024-02-27 ENCOUNTER — VIRTUAL VISIT (OUTPATIENT)
Dept: UROLOGY | Facility: CLINIC | Age: 49
End: 2024-02-27
Payer: COMMERCIAL

## 2024-02-27 ENCOUNTER — TELEPHONE (OUTPATIENT)
Dept: UROLOGY | Facility: CLINIC | Age: 49
End: 2024-02-27

## 2024-02-27 DIAGNOSIS — N39.46 MIXED STRESS AND URGE URINARY INCONTINENCE: ICD-10-CM

## 2024-02-27 DIAGNOSIS — N32.81 OAB (OVERACTIVE BLADDER): ICD-10-CM

## 2024-02-27 PROCEDURE — 99214 OFFICE O/P EST MOD 30 MIN: CPT | Mod: 95 | Performed by: UROLOGY

## 2024-02-27 RX ORDER — MIRABEGRON 25 MG/1
25 TABLET, FILM COATED, EXTENDED RELEASE ORAL DAILY
Qty: 30 TABLET | Refills: 0 | OUTPATIENT
Start: 2024-02-27

## 2024-02-27 RX ORDER — MIRABEGRON 25 MG/1
25 TABLET, FILM COATED, EXTENDED RELEASE ORAL DAILY
Qty: 30 TABLET | Refills: 3 | Status: SHIPPED | OUTPATIENT
Start: 2024-02-27 | End: 2024-07-29

## 2024-02-27 NOTE — PATIENT INSTRUCTIONS
Websites with free information:    [employer] patient website: www.voicesforpfd.org    Total Control Program: www.totalcontrolprogram.com    Please do the nurse visit to make sure you are emptying    It was a pleasure meeting with you today.  Thank you for allowing me and my team the privilege of caring for you today.  YOU are the reason we are here, and I truly hope we provided you with the excellent service you deserve.  Please let us know if there is anything else we can do for you so that we can be sure you are leaving completely satisfied with your care experience.    Cystoscopy    Cystoscopy is a procedure that lets your doctor look directly inside your urethra and bladder. It can be used to:  Help diagnose a problem with your urethra, bladder, or kidneys.  Take a sample (biopsy) of bladder or urethral tissue.  Treat certain problems (such as removing kidney stones).  Place a stent to bypass an obstruction.  Take special X-rays of the kidneys.  Based on the findings, your doctor may recommend other tests or treatments.  What is a cystoscope?  A cystoscope is a telescope-like instrument that contains lenses and fiberoptics (small glass wires that make bright light). The cystoscope may be straight and rigid, or flexible to bend around curves in the urethra. The doctor may look directly into the cystoscope, or project the image onto a monitor.  Getting ready  Ask your doctor if you should stop taking any medicines before the procedure.  Follow any other instructions your doctor gives you.  Tell your doctor before the exam if you:  Take any medicines, such as aspirin or blood thinners  Have allergies to any medicines  Are pregnant   The procedure  Cystoscopy is done in the doctor s office, surgery center, or hospital. The doctor and a nurse are present during the procedure. It takes only a few minutes, longer if a biopsy, X-ray, or treatment needs to be done.  During the procedure:  You lie on  an exam table on your back, knees bent and legs apart. You are covered with a drape.  Your urethra and the area around it are washed. Anesthetic jelly may be applied to numb the urethra.  The cystoscope is inserted. A sterile fluid is put into the bladder to expand it. You may feel pressure from this fluid.  When the procedure is done, the cystoscope is removed.  After the procedure   Once you re home:  Drink plenty of fluids.  You may have burning or light bleeding when you urinate--this is normal.  Medicines may be prescribed to ease any discomfort or prevent infection. Take these as directed.  Call your doctor if you have heavy bleeding or blood clots, burning that lasts more than a day, a fever over 100 F  (38  C), or trouble urinating.  Date Last Reviewed: 1/1/2017 2000-2017 [employer]. [address] [place] [address]. All rights reserved. This information is not intended as a substitute for professional medical care. Always follow your healthcare professional's instructions.

## 2024-02-27 NOTE — LETTER
2/27/2024       RE: Orquidea Arevalo  [address] [facility] [address]     Dear Colleague,    Thank you for referring your patient, Orquidea Arevalo, to the [facility] at [facility]. Please see a copy of my visit note below.    Virtual Visit Details    Type of service:  Video Visit     Originating Location (pt. Location): Home    Distant Location (provider location):  On-site  Platform used for Video Visit: Malik    February 27, 2024    Raffi was seen today for recheck.    Diagnoses and all orders for this visit:    Mixed stress and urge urinary incontinence  -     mirabegron (MYRBETRIQ) 25 MG 24 hr tablet; Take 1 tablet (25 mg) by mouth daily    OAB (overactive bladder)  -     mirabegron (MYRBETRIQ) 25 MG 24 hr tablet; Take 1 tablet (25 mg) by mouth daily    Continue medication for now    Needs PVR and if okay will try higher dose of the mirabegron    If not better will plan on cystoscopy and VUDS as next step given her prior history of endometrial cancer and surgeyr    14 minutes were spent today on the day of the encounter in reviewing the EMR including recent PCP visit, direct patient care including prescription medications, coordination of care and documentation    Basia Tavares MD MPH  (she/her/hers)   of Urology  [facility]      Subjective    Frequency and urgency is a bit better but the leakage is not    Denies gross hematuria or UTI    She denies any changes in health since last visit    BP recent checked at her PCP visit was 102/80 (note from JEANNE BURGESS CNP reviewed)    LMP  (LMP Unknown)   GENERAL: healthy, alert and no distress  EYES: Eyes grossly normal to inspection, conjunctivae and sclerae normal  HENT: normal cephalic/atraumatic.  External ears, nose and mouth without ulcers or lesions.  RESP: no audible wheeze, cough, or visible cyanosis.  No visible retractions or increased work  of breathing.  Able to speak fully in complete sentences.  NEURO: Cranial nerves grossly intact, mentation intact and speech normal  PSYCH: mentation appears normal, affect normal/bright, judgement and insight intact, normal speech and appearance well-groomed    CC  Patient Care Team:  Sheba Jane APRN CNP as PCP - General (Family Medicine)  Sheba Chowdary MD as MD (Gynecologic Oncology)  Sheba Chowdary MD as Assigned Cancer Care Provider  Holly Moreno MD as Assigned PCP  Katie Hurd AuD (Audiology)  Effie Hanson, NP as Nurse Practitioner  Holly Moreno MD as Referring Physician (Family Medicine)  Basia Tavares MD as MD (Urology)  Basia Tavares MD as Assigned Surgical Provider

## 2024-02-27 NOTE — NURSING NOTE
Is the patient currently in the state of [place]? YES    Visit mode:VIDEO    If the visit is dropped, the patient can be reconnected by: VIDEO VISIT: Text to cell phone:   Telephone Information:   Mobile [phone]    and VIDEO VISIT: Send to e-mail at: [email or web address]    Will anyone else be joining the visit? NO  (If patient encounters technical issues they should call [phone])    How would you like to obtain your AVS? MyChart    Are changes needed to the allergy or medication list? No    Reason for visit: JULIANNA TAMEZ

## 2024-02-27 NOTE — PROGRESS NOTES
Virtual Visit Details    Type of service:  Video Visit     Originating Location (pt. Location): Home    Distant Location (provider location):  On-site  Platform used for Video Visit: Himanshu    February 27, 2024    Raffi was seen today for recheck.    Diagnoses and all orders for this visit:    Mixed stress and urge urinary incontinence  -     mirabegron (MYRBETRIQ) 25 MG 24 hr tablet; Take 1 tablet (25 mg) by mouth daily    OAB (overactive bladder)  -     mirabegron (MYRBETRIQ) 25 MG 24 hr tablet; Take 1 tablet (25 mg) by mouth daily    Continue medication for now    Needs PVR and if okay will try higher dose of the mirabegron    If not better will plan on cystoscopy and VUDS as next step given her prior history of endometrial cancer and surgeyr    14 minutes were spent today on the day of the encounter in reviewing the EMR including recent PCP visit, direct patient care including prescription medications, coordination of care and documentation    Basia Tavares MD MPH  (she/her/hers)   of Urology  [facility]      Subjective    Frequency and urgency is a bit better but the leakage is not    Denies gross hematuria or UTI    She denies any changes in health since last visit    BP recent checked at her PCP visit was 102/80 (note from JEANNE BURGESS CNP reviewed)    LMP  (LMP Unknown)   GENERAL: healthy, alert and no distress  EYES: Eyes grossly normal to inspection, conjunctivae and sclerae normal  HENT: normal cephalic/atraumatic.  External ears, nose and mouth without ulcers or lesions.  RESP: no audible wheeze, cough, or visible cyanosis.  No visible retractions or increased work of breathing.  Able to speak fully in complete sentences.  NEURO: Cranial nerves grossly intact, mentation intact and speech normal  PSYCH: mentation appears normal, affect normal/bright, judgement and insight intact, normal speech and appearance well-groomed    CC  Patient Care Team:  Sheba Jane APRN CNP  as PCP - General (Family Medicine)  Sheba Chowdary MD as MD (Gynecologic Oncology)  Sheba Chowdary MD as Assigned Cancer Care Provider  Holly Moreno MD as Assigned PCP  Katie Hurd AuD (Audiology)  Effie Hanson, NP as Nurse Practitioner  Holly Moreno MD as Referring Physician (Family Medicine)  Basia Tavares MD as MD (Urology)  Basia Tavares MD as Assigned Surgical Provider

## 2024-02-28 ENCOUNTER — OFFICE VISIT (OUTPATIENT)
Dept: FAMILY MEDICINE | Facility: CLINIC | Age: 49
End: 2024-02-28
Payer: COMMERCIAL

## 2024-02-28 ENCOUNTER — TELEPHONE (OUTPATIENT)
Dept: UROLOGY | Facility: CLINIC | Age: 49
End: 2024-02-28

## 2024-02-28 VITALS
OXYGEN SATURATION: 98 % | TEMPERATURE: 98 F | HEIGHT: 64 IN | BODY MASS INDEX: 31.74 KG/M2 | SYSTOLIC BLOOD PRESSURE: 122 MMHG | DIASTOLIC BLOOD PRESSURE: 82 MMHG | RESPIRATION RATE: 18 BRPM | WEIGHT: 185.9 LBS | HEART RATE: 63 BPM

## 2024-02-28 DIAGNOSIS — R79.89 ELEVATED SERUM CREATININE: ICD-10-CM

## 2024-02-28 DIAGNOSIS — R00.1 BRADYCARDIA: ICD-10-CM

## 2024-02-28 DIAGNOSIS — G89.29 CHRONIC BILATERAL LOW BACK PAIN WITHOUT SCIATICA: ICD-10-CM

## 2024-02-28 DIAGNOSIS — Z12.4 CERVICAL CANCER SCREENING: Primary | ICD-10-CM

## 2024-02-28 DIAGNOSIS — F32.1 MAJOR DEPRESSIVE DISORDER, SINGLE EPISODE, MODERATE (H): ICD-10-CM

## 2024-02-28 DIAGNOSIS — R42 VERTIGO: ICD-10-CM

## 2024-02-28 DIAGNOSIS — G47.19 EXCESSIVE DAYTIME SLEEPINESS: ICD-10-CM

## 2024-02-28 DIAGNOSIS — R00.1 SLOW HEART RATE: ICD-10-CM

## 2024-02-28 DIAGNOSIS — R53.83 FATIGUE, UNSPECIFIED TYPE: ICD-10-CM

## 2024-02-28 DIAGNOSIS — M54.50 CHRONIC BILATERAL LOW BACK PAIN WITHOUT SCIATICA: ICD-10-CM

## 2024-02-28 DIAGNOSIS — D25.9 UTERINE LEIOMYOMA, UNSPECIFIED LOCATION: ICD-10-CM

## 2024-02-28 PROCEDURE — 99214 OFFICE O/P EST MOD 30 MIN: CPT | Mod: 25 | Performed by: NURSE PRACTITIONER

## 2024-02-28 PROCEDURE — 93010 ELECTROCARDIOGRAM REPORT: CPT | Performed by: INTERNAL MEDICINE

## 2024-02-28 PROCEDURE — 99207 E-CONSULT TO SLEEP MEDICINE (ADULT OUTPT PROVIDER TO SPECIALIST WRITTEN QUESTION & RESPONSE): CPT | Performed by: NURSE PRACTITIONER

## 2024-02-28 PROCEDURE — 93005 ELECTROCARDIOGRAM TRACING: CPT | Performed by: NURSE PRACTITIONER

## 2024-02-28 RX ORDER — ONDANSETRON 4 MG/1
4 TABLET, FILM COATED ORAL EVERY 8 HOURS PRN
Qty: 30 TABLET | Refills: 4 | Status: SHIPPED | OUTPATIENT
Start: 2024-02-28

## 2024-02-28 NOTE — TELEPHONE ENCOUNTER
----- Message from Susan Nicolas sent at 2/28/2024  8:28 AM CST -----  Regarding: BV nurse visit  Nurse visit in [facility] for PVR, please give 3 day bladder diary and hat that time    CSF  2/27/24

## 2024-02-28 NOTE — PROGRESS NOTES
Assessment & Plan   Vertigo  Patient with history of ear pain, vertigo that is accompanied with nausea and vomiting.  Also sometimes postnasal drip and sore throat with this.  Has previously been seen by 2 ENTs, the [facility] though did not engage in any physical therapy there, also seen by neurologist.  AMOS received today and brief HPI done today.  Patient to follow-up with me to review content from specialist (have not received this).      Patient should continue to follow-up with ENT and stoppage after migraine treatment for further workup.  And establish an actual correct diagnosis.     Refilling zofan, pt to continue meclizine PRN    Consider 3rd opinion    - ondansetron (ZOFRAN) 4 MG tablet  Dispense: 30 tablet; Refill: 4  - EKG 12-lead, tracing only  - Cortisol    - meclizine (ANTIVERT) 25 MG tablet  Dispense: 20 tablet; Refill: 1  - ondansetron (ZOFRAN) 4 MG tablet  Dispense: 30 tablet; Refill: 4     Fatigue, unspecified type  Elevated serum creatinine  Normal A1c, CBC, CMP ( outside of creatinine) and TSH - reviewed.      - Comprehensive metabolic panel (BMP + Alb, Alk Phos, ALT, AST, Total. Bili, TP)  - CBC with platelets  - TSH with free T4 reflex  - Comprehensive metabolic panel (BMP + Alb, Alk Phos, ALT, AST, Total. Bili, TP)  - CBC with platelets  - TSH with free T4 reflex  - Comprehensive metabolic panel (BMP + Alb, Alk Phos, ALT, AST, Total. Bili, TP)  - Hemoglobin A1c  - Comprehensive metabolic panel (BMP + Alb, Alk Phos, ALT, AST, Total. Bili, TP)  - Hemoglobin A1c    Bradycardia  EKG shows Sinus arcadio  Reviewed EKG and also previous scan from her phone showing mold aortic athlesclertosis  - Cortisol    Elevated serum creatinine  family history of early kidney disease and current medication she is on.   -denies chronic or high use of NSAIDS,  no HTN or DM2  -consider referral to renal if worsens  Recheck in 3 months    Excessive daytime sleepiness  Pt with reported RR/min at 7 per  "her phone - also records low oxygenation of 85% per pt - would like to have sleep study  Order placed - she does score an 8 on epworth sleepiness scale today   -referral placed to at home test  - Adult E-Consult to Sleep Medicine (Outpt Provider to Specialist Written Question & Response)                        Yvrose Nugent is a 48 year old, presenting for the following health issues:  Follow Up (Follow up on weight management, low heart rate, and EKG, and patient would also like to discuss heart rate and oxygen she collected.)        2/28/2024     2:09 PM   Additional Questions   Roomed by LC-LPN   Accompanied by N/A     History of Present Illness       Vascular Disease:  She presents for follow up of vascular disease.     She never takes nitroglycerin. She is not taking daily aspirin.    She eats 0-1 servings of fruits and vegetables daily.She consumes 0 sweetened beverage(s) daily.She exercises with enough effort to increase her heart rate 30 to 60 minutes per day.  She exercises with enough effort to increase her heart rate 4 days per week.   She is taking medications regularly.     Other Endometrial - Every 3 months for 2 years, then every 6 months for an additional 3 years, then annually. Surveillance to include a pelvic and rectal exam with provider visit.  No pap smear indicated.       Hx of pressure in ears,throat pain sometimes, and feels fluid drains, virtigo/nausea? Feels brain zaps.   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 Went to the [facility] dizzy and balance clinic - ?semicircular canal dehiscence? Told her to see ENT in [place] - has suggested allergies and no improvement after treatment.  Then ENT Treating her for migraines - engality shot for migraines?    -still gets \"zaps,\" (atypical migraine) left ear ringing remains -worse whit turning head or opening jaw. Symptoms " precede stating mental health medications.      Drinks 80 oz of water     Trying to do crossfit 3 times/week   Fatigue limits her - feeling fatigue all the time.      Still get hot flashes.   Hysterectomy at age 45yo - Bl oophorectomy.   Letrazole for 3 years     HR 41-54 today - Resting HR 55,      MDD/anxiety/conversion disorder  Janiya MORRIS - [facility] - wellbutrin 150mg daily XR, ativan for flights - rare, zoloft 75mg daily.  Passive SI.     Medical cannabis to sleep  3 years     Back -flexeril PRN (l5?) - chiropractor  - initially rx'd at urgent care - has periodic episodes.      OAB - mirabegron 25mg - sees urology, stress incont?  Not sure if this is working. Has follow-up with uro.        Weight gain      Wt Readings from Last 10 Encounters:   02/19/24 85.3 kg (188 lb 1.6 oz)   11/09/23 83.4 kg (183 lb 12.8 oz)   10/26/23 81.6 kg (180 lb)   10/12/23 81.6 kg (180 lb)   05/11/23 80 kg (176 lb 6.4 oz)         Kidney function - father with CKD.     Colon CA - repeat in 7/2026 - hx of polyp   Mammo - UTD - s/p FNA Bio      DEXA - DXA RESULTS 11/1/23  -Lumbar Spine: L1-L4: BMD: 1.218 g/cm2. T-score: 0.3. Z-score: 0.1.   -RIGHT Hip Total: BMD: 1.043 g/cm2. T-score: 0.3. Z-score: 0.4.   -RIGHT Hip Femoral neck: BMD: 1.019 g/cm2. T-score: -0.1. Z-score: 0.3.   -LEFT Hip Total: BMD: 1.048 g/cm2. T-score: 0.3. Z-score: 0.4.   -LEFT Hip Femoral neck: BMD: 1.055 g/cm2. T-score: 0.1. Z-score: 0.6.      Breast augmentation 2007.   Saline implant s/p rupture of left breat - no revision      Grantham sleepiness score - moderate - 8.     Get pain in fingers, shoulder, ankle.s hips - even without movement, has never has injuries.     [facility]   Specialist ENT - third window syndrome, other ENT said nope it's allergies.                   Objective    /82 (BP Location: Right arm, Patient Position: Sitting, Cuff Size: Adult Regular)   Pulse 63   Temp 98  F (36.7  C) (Oral)   Resp  "18   Ht 1.631 m (5' 4.2\")   Wt 84.3 kg (185 lb 14.4 oz)   LMP  (LMP Unknown)   SpO2 98%   BMI 31.71 kg/m    Body mass index is 31.71 kg/m .  Physical Exam   GENERAL: alert and no distress  RESP: easy work of breathing  CV: arcadio cardia - no murmur  MS: no gross musculoskeletal defects noted, no edema  PSYCH: mentation appears normal, affect normal/bright            Signed Electronically by: ADITYA Crain CNP    "

## 2024-02-29 ENCOUNTER — E-CONSULT (OUTPATIENT)
Dept: SLEEP MEDICINE | Facility: CLINIC | Age: 49
End: 2024-02-29
Payer: COMMERCIAL

## 2024-02-29 ENCOUNTER — LAB (OUTPATIENT)
Dept: LAB | Facility: CLINIC | Age: 49
End: 2024-02-29
Payer: COMMERCIAL

## 2024-02-29 DIAGNOSIS — R06.83 SNORING: Primary | ICD-10-CM

## 2024-02-29 DIAGNOSIS — R53.83 FATIGUE, UNSPECIFIED TYPE: ICD-10-CM

## 2024-02-29 DIAGNOSIS — R42 VERTIGO: ICD-10-CM

## 2024-02-29 DIAGNOSIS — R00.1 BRADYCARDIA: ICD-10-CM

## 2024-02-29 DIAGNOSIS — R00.1 SLOW HEART RATE: ICD-10-CM

## 2024-02-29 DIAGNOSIS — G47.19 EXCESSIVE DAYTIME SLEEPINESS: ICD-10-CM

## 2024-02-29 LAB
ATRIAL RATE - MUSE: 53 BPM
CORTIS SERPL-MCNC: 13.2 UG/DL
DIASTOLIC BLOOD PRESSURE - MUSE: NORMAL MMHG
INTERPRETATION ECG - MUSE: NORMAL
P AXIS - MUSE: 29 DEGREES
PR INTERVAL - MUSE: 150 MS
QRS DURATION - MUSE: 86 MS
QT - MUSE: 460 MS
QTC - MUSE: 431 MS
R AXIS - MUSE: 51 DEGREES
SYSTOLIC BLOOD PRESSURE - MUSE: NORMAL MMHG
T AXIS - MUSE: 58 DEGREES
VENTRICULAR RATE- MUSE: 53 BPM

## 2024-02-29 PROCEDURE — 36415 COLL VENOUS BLD VENIPUNCTURE: CPT

## 2024-02-29 PROCEDURE — 99451 NTRPROF PH1/NTRNET/EHR 5/>: CPT | Performed by: INTERNAL MEDICINE

## 2024-02-29 PROCEDURE — 82533 TOTAL CORTISOL: CPT

## 2024-02-29 NOTE — PROGRESS NOTES
2/29/2024     E-Consult has been accepted.    Interprofessional consultation requested by:  Sheba Jane APRN CNP      Clinical Question/Purpose: MY CLINICAL QUESTION IS: pt wanting home sleep study -day time sleepiness, chronic fatigue, not sure if snores    Patient assessment and information reviewed: Pertinent medical information available in the medical records was reviewed in detail along with recent laboratory data particularly comprehensive metabolic panels dated 02/19/2024 and 10/12/2023.  The pretest probability for obstructive sleep apnea is moderate.  In the absence of significant cardiopulmonary comorbidities a type III home sleep study is a reasonable screening test for sleep disordered breathing.    Recommendations:  1) NOx 3, home sleep study has been ordered to screen for and determine the severity of possible sleep apnea.  2) Patient to be seen in sleep providers clinic for more comprehensive evaluation.      The recommendations provided in this E-Consult are based on a review of clinical data pertinent to the clinical question presented, without a review of the patient's complete medical record or, the benefit of a comprehensive in-person or virtual patient evaluation. This consultation should not replace the clinical judgement and evaluation of the provider ordering this E-Consult. Any new clinical issues, or changes in patient status since the filing of this E-Consult will need to be taken into account when assessing these recommendations. Please contact me if you have further questions.    My total time spent reviewing clinical information and formulating assessment was 11 minutes.        Darci Carlos MD

## 2024-03-01 ENCOUNTER — MYC MEDICAL ADVICE (OUTPATIENT)
Dept: FAMILY MEDICINE | Facility: CLINIC | Age: 49
End: 2024-03-01
Payer: COMMERCIAL

## 2024-03-01 DIAGNOSIS — R55 PRE-SYNCOPE: ICD-10-CM

## 2024-03-01 DIAGNOSIS — R53.83 FATIGUE, UNSPECIFIED TYPE: ICD-10-CM

## 2024-03-01 DIAGNOSIS — R00.1 BRADYCARDIA: Primary | ICD-10-CM

## 2024-03-04 ENCOUNTER — HOSPITAL ENCOUNTER (OUTPATIENT)
Dept: CT IMAGING | Facility: CLINIC | Age: 49
Discharge: HOME | End: 2024-03-04
Attending: OBSTETRICS & GYNECOLOGY | Admitting: OBSTETRICS & GYNECOLOGY
Payer: COMMERCIAL

## 2024-03-04 DIAGNOSIS — C54.1 ENDOMETRIAL CANCER (H): ICD-10-CM

## 2024-03-04 PROCEDURE — 71260 CT THORAX DX C+: CPT

## 2024-03-04 PROCEDURE — 250N000011 HC RX IP 250 OP 636: Performed by: OBSTETRICS & GYNECOLOGY

## 2024-03-04 RX ORDER — IOPAMIDOL 755 MG/ML
90 INJECTION, SOLUTION INTRAVASCULAR ONCE
Status: COMPLETED | OUTPATIENT
Start: 2024-03-04 | End: 2024-03-04

## 2024-03-04 RX ADMIN — IOPAMIDOL 90 ML: 755 INJECTION, SOLUTION INTRAVENOUS at 13:19

## 2024-03-14 ENCOUNTER — ONCOLOGY VISIT (OUTPATIENT)
Dept: ONCOLOGY | Facility: CLINIC | Age: 49
End: 2024-03-14
Attending: OBSTETRICS & GYNECOLOGY
Payer: COMMERCIAL

## 2024-03-14 ENCOUNTER — OFFICE VISIT (OUTPATIENT)
Dept: CARDIOLOGY | Facility: CLINIC | Age: 49
End: 2024-03-14
Payer: COMMERCIAL

## 2024-03-14 VITALS
DIASTOLIC BLOOD PRESSURE: 88 MMHG | HEIGHT: 64 IN | SYSTOLIC BLOOD PRESSURE: 118 MMHG | WEIGHT: 185 LBS | OXYGEN SATURATION: 98 % | BODY MASS INDEX: 31.58 KG/M2 | HEART RATE: 68 BPM

## 2024-03-14 VITALS
WEIGHT: 185 LBS | BODY MASS INDEX: 31.76 KG/M2 | HEART RATE: 59 BPM | SYSTOLIC BLOOD PRESSURE: 116 MMHG | TEMPERATURE: 97.8 F | OXYGEN SATURATION: 99 % | RESPIRATION RATE: 16 BRPM | DIASTOLIC BLOOD PRESSURE: 79 MMHG

## 2024-03-14 DIAGNOSIS — R53.82 CHRONIC FATIGUE: ICD-10-CM

## 2024-03-14 DIAGNOSIS — R00.1 BRADYCARDIA: Primary | ICD-10-CM

## 2024-03-14 DIAGNOSIS — R10.84 ABDOMINAL PAIN, GENERALIZED: ICD-10-CM

## 2024-03-14 DIAGNOSIS — C54.1 ENDOMETRIAL CANCER (H): Primary | ICD-10-CM

## 2024-03-14 PROCEDURE — 99204 OFFICE O/P NEW MOD 45 MIN: CPT | Performed by: INTERNAL MEDICINE

## 2024-03-14 PROCEDURE — 99214 OFFICE O/P EST MOD 30 MIN: CPT | Performed by: OBSTETRICS & GYNECOLOGY

## 2024-03-14 PROCEDURE — 99213 OFFICE O/P EST LOW 20 MIN: CPT | Performed by: OBSTETRICS & GYNECOLOGY

## 2024-03-14 RX ORDER — ACETAMINOPHEN 500 MG
500-1000 TABLET ORAL EVERY 6 HOURS PRN
COMMUNITY

## 2024-03-14 RX ORDER — IBUPROFEN 200 MG
200 TABLET ORAL EVERY 4 HOURS PRN
COMMUNITY

## 2024-03-14 ASSESSMENT — PAIN SCALES - GENERAL: PAINLEVEL: MODERATE PAIN (5)

## 2024-03-14 NOTE — PROGRESS NOTES
SERVICE DATE: March 14, 2024      PRIMARY CARE AND REFERRING PROVIDER:  Sheba Jane  [address] [facility] [address]    REASON FOR VISIT:  Bradycardia.    HISTORY OF PRESENT ILLNESS:  Orquidea Arevalo is a pleasant 48 year old female, new to cardiology.  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  She is currently unemployed and on disability.    She is being referred by her primary for incidental finding of bradycardia of 50s and chronic fatigue.  She was at her recent PCP visit, tells me that her resting heart rate was noted to be 41, an EKG was obtained which I personally reviewed (dated 2/28/2024).  It showed sinus bradycardia of 53 bpm, normal conduction, normal cardiac intervals.  I noticed that today blood pressure is 118/88, pulse is 68 bpm. Raffi says that through most of her adult life her heart rate has been in the 50s.  Historically she has been very athletic doing triathlons, running, CrossFit etc.  Even currently, she does CrossFit 2-4 times a week and has no cardiovascular symptoms.    For almost 2 years she has had chronic vertigo, dizziness and fatigue associated with postnasal drip and daytime somnolence.  Recently referred to sleep clinic.  She is been evaluated by 2 separate ENT teams, balance center and neurology with negative workup. She is on bupropion, Flexeril, sertraline.      Patient is concerned that her mother also had sinus bradycardia through most of her adult life and in her late 70s needed a pacemaker.    Labs reviewed - normal renal panel with normal electrolytes and a creatinine of 1.0, normal CBC, normal TSH.    On exam - Regular heart sounds, no murmur, no carotid bruit.  Lungs are clear to auscultation.      DIAGNOSES/ASSESSMENT:  Sinus bradycardia in a patient who has been athletic for many years.   "Likely physiologic.  She herself states that her heart rate has been in the 50s for many years and this is certainly the case when going back and reviewing her records.  ECG shows sinus bradycardia with normal conduction and normal cardiac intervals.  Will get a heart monitor to confirm.  Chronic fatigue.  Unlikely to be cardiac given that she is able to do high intensity exercise.  She is very troubled by this.  Will get a transthoracic echocardiogram.  Agree with sleep clinic referral.  Other symptoms of daytime somnolence, dizziness, vertigo with negative workup.    PLAN:  Detailed discussion with patient.  I counseled her about physiologic bradycardia, reassured her that this is unlikely to be pathological or causative for her symptoms, as she is able to do high intensity exercises like CrossFit.  Also reassured her that bradycardia does not run in families.  And that her mother got a pacemake in her 70s, which does not necessitate that she herself would need one.  7-day Zio patch heart monitor.  Echocardiogram.  If abnormal results, we will contact her to follow-up with the cardiology [facility].  Otherwise, refer back to PCP.      Mark Pelaez MD      New patient.  The level of medical decision making during this visit was of moderate complexity.      Vitals: /88 (BP Location: Right arm, Patient Position: Sitting, Cuff Size: Adult Regular)   Pulse 68   Ht 1.626 m (5' 4\")   Wt 83.9 kg (185 lb)   LMP  (LMP Unknown)   SpO2 98%   BMI 31.76 kg/m    Wt Readings from Last 5 Encounters:   03/14/24 83.9 kg (185 lb)   02/28/24 84.3 kg (185 lb 14.4 oz)   02/19/24 85.3 kg (188 lb 1.6 oz)   11/09/23 83.4 kg (183 lb 12.8 oz)   10/26/23 81.6 kg (180 lb)         Orders Placed This Encounter   Procedures    Echocardiogram Complete           CURRENT MEDICATIONS:  Current Outpatient Medications   Medication Sig Dispense Refill    acetaminophen (TYLENOL) 500 MG tablet Take 500-1,000 mg by mouth every 6 hours as " needed for mild pain      buPROPion (WELLBUTRIN XL) 150 MG 24 hr tablet Take 150 mg by mouth every morning      cyclobenzaprine (FLEXERIL) 10 MG tablet Take 1 tablet (10 mg) by mouth nightly as needed for muscle spasms 30 tablet 3    ibuprofen (ADVIL/MOTRIN) 200 MG tablet Take 200 mg by mouth every 4 hours as needed for pain      LORazepam (ATIVAN) 0.5 MG tablet Take 0.5 mg by mouth as needed      meclizine (ANTIVERT) 25 MG tablet Take 1 tablet (25 mg) by mouth 3 times daily as needed for dizziness 20 tablet 1    mirabegron (MYRBETRIQ) 25 MG 24 hr tablet Take 1 tablet (25 mg) by mouth daily 30 tablet 3    ondansetron (ZOFRAN) 4 MG tablet Take 1 tablet (4 mg) by mouth every 8 hours as needed for nausea or vomiting 30 tablet 4    sertraline (ZOLOFT) 50 MG tablet Take 75 mg by mouth daily           ALLERGIES:  Allergies   Allergen Reactions    Ciprofloxacin Dizziness       PAST MEDICAL HISTORY:    Past Medical History:   Diagnosis Date    Arthritis 2020    Joint pain in fingers, ankles, back, wrists, shoukders, etc    Atypical squamous cells of undetermined significance (ASCUS) on Papanicolaou smear of cervix     Formatting of this note might be different from the original.  02/04/2010 ASCUS/HPV Negative.    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    Depressive disorder 5/2023    Major depressive elisode and anxiety    Hydronephrosis        PAST SURGICAL HISTORY:    Past Surgical History:   Procedure Laterality Date    ABDOMEN SURGERY  2019 and 2029    Hysterectomy and tumour removal - BL oopherectomy    BREAST SURGERY  2007    Breast augmentation    COLONOSCOPY  2021    Polyps removed-repeat in 5 years    GENITOURINARY SURGERY  2001    Tibal ligation    HYSTERECTOMY VAGINAL, BILATERAL SALPINGO-OOPHERECTOMY, COMBINED      December 2019- and Feb 2020    HYSTERECTOMY, PAP NO LONGER INDICATED      at 43yo       FAMILY  HISTORY:    Family History   Problem Relation Age of Onset    Hypertension Mother     Depression Mother     Obesity Mother     Heart Surgery Mother 70        PPM placement  - Martin in the 40s and then 130s?    Atrial fibrillation Mother     Hypertension Father     Other Cancer Father         Basal cell on face and neck    Substance Abuse Father     Mental Illness Brother         Boderline personality disorder    Substance Abuse Brother     Obesity Brother     Anxiety Disorder Maternal Grandmother     Diabetes Maternal Grandfather     Cancer Paternal Grandfather         ?

## 2024-03-14 NOTE — LETTER
3/14/2024    Sheba JaneADITYA CNP  [address] [facility] [address]    RE: Orquidea Fosteron       Dear Colleague,     I had the pleasure of seeing Orquidea Arevalo in the [facility].    SERVICE DATE: March 14, 2024      PRIMARY CARE AND REFERRING PROVIDER:  EduinaureliaSheba  [address] [facility] [address]    REASON FOR VISIT:  Bradycardia.    HISTORY OF PRESENT ILLNESS:  Orquidea Arevalo is a pleasant 48 year old female, new to cardiology.  She has a history of endometrial sarcoma treated at [facility], chronic vertigo and dizziness for the last 1.5 years with negative ENT and neurology workup, PTSD, anxiety, depression, never smoker, BMI 32.  She is currently unemployed and on disability.    She is being referred by her primary for incidental finding of bradycardia of 50s and chronic fatigue.  She was at her recent PCP visit, tells me that her resting heart rate was noted to be 41, an EKG was obtained which I personally reviewed (dated 2/28/2024).  It showed sinus bradycardia of 53 bpm, normal conduction, normal cardiac intervals.  I noticed that today blood pressure is 118/88, pulse is 68 bpm. Raffi says that through most of her adult life her heart rate has been in the 50s.  Historically she has been very athletic doing triathlons, running, CrossFit etc.  Even currently, she does CrossFit 2-4 times a week and has no cardiovascular symptoms.    For almost 2 years she has had chronic vertigo, dizziness and fatigue associated with postnasal drip and daytime somnolence.  Recently referred to sleep clinic.  She is been evaluated by 2 separate ENT teams, balance center and neurology with negative workup. She is on bupropion, Flexeril, sertraline.      Patient is concerned that her mother also had sinus bradycardia through most of her adult life and in her late 70s needed a pacemaker.    Labs reviewed - normal renal panel with normal electrolytes and a creatinine of 1.0, normal  "CBC, normal TSH.    On exam - Regular heart sounds, no murmur, no carotid bruit.  Lungs are clear to auscultation.      DIAGNOSES/ASSESSMENT:  Sinus bradycardia in a patient who has been athletic for many years.  Likely physiologic.  She herself states that her heart rate has been in the 50s for many years and this is certainly the case when going back and reviewing her records.  ECG shows sinus bradycardia with normal conduction and normal cardiac intervals.  Will get a heart monitor to confirm.  Chronic fatigue.  Unlikely to be cardiac given that she is able to do high intensity exercise.  She is very troubled by this.  Will get a transthoracic echocardiogram.  Agree with sleep clinic referral.  Other symptoms of daytime somnolence, dizziness, vertigo with negative workup.    PLAN:  Detailed discussion with patient.  I counseled her about physiologic bradycardia, reassured her that this is unlikely to be pathological or causative for her symptoms, as she is able to do high intensity exercises like CrossFit.  Also reassured her that bradycardia does not run in families.  And many patients in their late 70s need pacemakers which does not necessitate that she herself would get 1.  7-day Zio patch heart monitor.  Echocardiogram.  If abnormal results, we will contact her to follow-up with the cardiology [facility].  Otherwise, refer back to PCP.      Mark Pelaez MD      New patient.  The level of medical decision making during this visit was of moderate complexity.      Vitals: /88 (BP Location: Right arm, Patient Position: Sitting, Cuff Size: Adult Regular)   Pulse 68   Ht 1.626 m (5' 4\")   Wt 83.9 kg (185 lb)   LMP  (LMP Unknown)   SpO2 98%   BMI 31.76 kg/m    Wt Readings from Last 5 Encounters:   03/14/24 83.9 kg (185 lb)   02/28/24 84.3 kg (185 lb 14.4 oz)   02/19/24 85.3 kg (188 lb 1.6 oz)   11/09/23 83.4 kg (183 lb 12.8 oz)   10/26/23 81.6 kg (180 lb)         Orders Placed This Encounter "   Procedures    Echocardiogram Complete           CURRENT MEDICATIONS:  Current Outpatient Medications   Medication Sig Dispense Refill    acetaminophen (TYLENOL) 500 MG tablet Take 500-1,000 mg by mouth every 6 hours as needed for mild pain      buPROPion (WELLBUTRIN XL) 150 MG 24 hr tablet Take 150 mg by mouth every morning      cyclobenzaprine (FLEXERIL) 10 MG tablet Take 1 tablet (10 mg) by mouth nightly as needed for muscle spasms 30 tablet 3    ibuprofen (ADVIL/MOTRIN) 200 MG tablet Take 200 mg by mouth every 4 hours as needed for pain      LORazepam (ATIVAN) 0.5 MG tablet Take 0.5 mg by mouth as needed      meclizine (ANTIVERT) 25 MG tablet Take 1 tablet (25 mg) by mouth 3 times daily as needed for dizziness 20 tablet 1    mirabegron (MYRBETRIQ) 25 MG 24 hr tablet Take 1 tablet (25 mg) by mouth daily 30 tablet 3    ondansetron (ZOFRAN) 4 MG tablet Take 1 tablet (4 mg) by mouth every 8 hours as needed for nausea or vomiting 30 tablet 4    sertraline (ZOLOFT) 50 MG tablet Take 75 mg by mouth daily           ALLERGIES:  Allergies   Allergen Reactions    Ciprofloxacin Dizziness       PAST MEDICAL HISTORY:    Past Medical History:   Diagnosis Date    Arthritis 2020    Joint pain in fingers, ankles, back, wrists, shoukders, etc    Atypical squamous cells of undetermined significance (ASCUS) on Papanicolaou smear of cervix     Formatting of this note might be different from the original.  02/04/2010 ASCUS/HPV Negative.    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    Depressive disorder 5/2023    Major depressive elisode and anxiety    Hydronephrosis        PAST SURGICAL HISTORY:    Past Surgical History:   Procedure Laterality Date    ABDOMEN SURGERY  2019 and 2029    Hysterectomy and tumour removal - BL oopherectomy    BREAST SURGERY  2007    Breast augmentation    COLONOSCOPY  2021    Polyps removed-repeat in 5  years    GENITOURINARY SURGERY  2001    Tibal ligation    HYSTERECTOMY VAGINAL, BILATERAL SALPINGO-OOPHERECTOMY, COMBINED      December 2019- and Feb 2020    HYSTERECTOMY, PAP NO LONGER INDICATED      at 43yo       FAMILY HISTORY:    Family History   Problem Relation Age of Onset    Hypertension Mother     Depression Mother     Obesity Mother     Heart Surgery Mother 70        PPM placement  - Martin in the 40s and then 130s?    Atrial fibrillation Mother     Hypertension Father     Other Cancer Father         Basal cell on face and neck    Substance Abuse Father     Mental Illness Brother         Boderline personality disorder    Substance Abuse Brother     Obesity Brother     Anxiety Disorder Maternal Grandmother     Diabetes Maternal Grandfather     Cancer Paternal Grandfather         ?       Thank you for allowing me to participate in the care of your patient.      Sincerely,   Mark Pelaez MD   [facility]  cc:   ADITYA Crain CNP  [address] [place] [address]

## 2024-03-14 NOTE — NURSING NOTE
"Oncology Rooming Note    March 14, 2024 2:47 PM   Orquidea Arevalo is a 48 year old female who presents for:    Chief Complaint   Patient presents with    Oncology Clinic Visit     Endometrial stromal sarcoma     Initial Vitals: /79   Pulse 59   Temp 97.8  F (36.6  C) (Oral)   Resp 16   Wt 83.9 kg (185 lb)   LMP  (LMP Unknown)   SpO2 99%   BMI 31.76 kg/m   Estimated body mass index is 31.76 kg/m  as calculated from the following:    Height as of an earlier encounter on 3/14/24: 1.626 m (5' 4\").    Weight as of this encounter: 83.9 kg (185 lb). Body surface area is 1.95 meters squared.  Moderate Pain (5) Comment: Data Unavailable   No LMP recorded (lmp unknown). Patient has had a hysterectomy.  Allergies reviewed: Yes  Medications reviewed: Yes    Medications: Medication refills not needed today.  Pharmacy name entered into [facility]Life: [facility] [address] [place] [address] [id number]    Frailty Screening:   Is the patient here for a new oncology consult visit in cancer care? 2. No      Clinical concerns: Pt would like to discuss fatigue and weight gain.        Radha Johnson CMA              "

## 2024-03-14 NOTE — PROGRESS NOTES
Consult Notes on Referred Patient    Date: 23    RE: Orquidea Diggs  : 1975  EVER: 23    Orquidea Diggs is a very pleasant 48 year old woman with a diagnosis of ESS.  She presents today in follow up.    Today:  Patient is doing okay. Has been very fatigued for quite a while now and feels it is worsening. This is being worked up by her PCP. She just saw cardiology today for workup of her bradycardia to see if this is contributing - however they do not believe a cardiac source is the cause of her fatigue. She is going to see the sleep clinic as well. She has struggled with anxiety/depression significantly recently. Her stepfather just passed away from dementia and so this week has been very difficult for her.     She also notes a lot of crampy abdominal pain, which she feels is stool moving through her bowels. Sometimes this is very painful and difficult to manage. This comes and goes. She voids spontaneously, but has struggled with incontinence and follows with Dr. Tavares for this. Her urgency and frequency have improved since starting miragebron, but she is still having stress leakage so is planning to follow-up with Dr. Tavares again soon. She has persistent nausea which has been going on for a long time, but no emesis. She is able to eat and drink without issue. Denies chest pain, SOB.    Oncology History  Ms. Arevalo is a very pleasant 47 y.o. woman with the following oncologic history:    Oncology History   Sarcoma Endometrial Stromal (HCC)   Genetic Testing and Tumor Genotyping   None    Initial Diagnosis   Sarcoma Endometrial Stromal (HCC)    Symptoms began in 2019, right lower quadrant abdominal pain, normal menstrual period. Ultrasound revealed multiple fibroids. Managed with Lupron considered but patient declined.    2019 Surgery and Procedures with OB/GYN  total laparoscopic hysterectomy, bilateral salpingectomy with only right oophorectomy. A  presumed fibroid mass wrapped around a ligament. Left ovary appeared normal. 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 [place] did not see it involving the fallopian tube but such was recorded/reported.    ([facility]: 14.5 cm LGESS extending into 1 FT; LVSI +)    12/27/2019, CT chest abdomen pelvis shows right and left pelvic masses possibly representing metastatic disease. Chest imaging was not definitive for metastases with some enlarged but not significantly enlarged nodes:  Postoperative changes of hysterectomy, bilateral salpingectomy, and right  oophorectomy.     Within the right adnexal region there is a 3.9 x 3.3 cm hyperenhancing soft  tissue mass concerning for metastatic disease or local recurrence. Expansive  filling defect within the right internal iliac vein that extends cranially to  the level of the mid right common iliac vein (series 4, image 166; series 605,  image 65). The density is similar to the right adnexal mass, which is concerning  for tumor thrombus. No evidence of DVT in the right external iliac or common  femoral veins.    Within the left adnexa there is a 7.1 x 3.7 cm loculated cystic lesion.     Multiple tiny hypodense hepatic lesions most likely represent benign  hemangiomas/cysts, but are indeterminate for metastases. The gallbladder,  pancreas, adrenal glands, and kidneys are normal. Small splenules. No  lymphadenopathy in the abdomen or pelvis. Normal caliber small and large bowel.  No aggressive osseous lesions.    1/17/2020 Tumor Board   Path review: LGESS arising from the myometrium perhaps extending to uterine cornua but no evidence of the slides submitted that there is any disease on a fallopian tube or ovary  Rad review: Some LGESS may have FDG update on PET; mediastinal node is suspicious   Biopsy of the mediastinal node and if positive, consider resection vs. adjuvant treatment (hormone therapy) after surgery  Return to the  OR for resection of residual disease and LSO    2/10/2020 Surgery and Procedures:02/10/2020 EXPLORATORY LAPAROTOMY., LEFT OOPHORECTOMY, RESECTION PELVIC MASS., UPPER VAGINECTOMY, CYSTOSCOPY, INSERTION STENT URETER., Resection of right internal iliac vein with intravenous tumor. Repair of right common iliac vein., Lymphadenectomy     Surgery  Exlap, resection of pelvic disease remaining from original surgery elsewhere. Resection of tumor mass within the right pelvic iliac vein system.   Complete gross resection.   Stage IIB    3/7/2020 - Biological/Targeted/Hormone Therapy   Letrozole 2.5 daily. At first month, nausea is minimal. Hot flashes are ok. Working from home and staying active. 2-3 mi walking 5 days. CrossFit 40 min three times weekly. Baseline bone mineral density is good, no osteopenia.    3/2023: Letrozole stopped due to side effects     3/20/23: CT CAP    COMPARISON:  Multiple priors, most recently CT 06/11/2022     FINDINGS:     Prior hysterectomy, bilateral salpingo-oophorectomy, right iliac vein resection, and partial   vaginectomy for endometrial stromal sarcoma. Postsurgical changes and surgical clips in the pelvis.     There is tethering of the right distal ureter in the pelvis with mild associated urothelial   thickening and enhancement, for example series 1, image 111. Mild upstream hydroureter without   hydronephrosis. The contralateral ureter is normal. The bladder is within expected limits. No   recurrent soft tissue in the pelvis or along the iliac veins. No new adenopathy or free fluid.     Multiple hypoattenuating lesions in the liver are stable dating back to 01/22/2020 and likely   represent benign cysts. No suspicious hepatic masses. Non-cirrhotic morphology of the liver with   patent portal and hepatic veins.     The gallbladder and biliary tree are normal. No suspicious pancreatic masses. The adrenal glands and   kidneys are normal. Normal spleen with 2 small splenules. The bowel is  within expected limits.     Tiny fat-containing umbilical hernia. No aggressive osseous lesions.     A CT chest was performed at the same time which will be reported separately.     IMPRESSION:   1.  No findings of recurrence in the abdomen or pelvis.   2.  A CT chest was performed at the same time which will be reported separately.    CT chest:  FINDINGS:   This examination was performed in conjunction with a CT of the abdomen, which will be reported   separately.     No suspicious lung nodule. No bulky thoracic lymphadenopathy. No suspicious osseous lesion. No   pleural effusion. Interval left breast implant rupture. Right breast implant appears similar since   prior exam.     IMPRESSION:   1.  No thoracic metastatic disease.   2.  Interval left breast implant rupture.    11/2023: DXA scan  NORMAL. Bone mineral density measurements are within normal limits using T score.      Review of Systems:    I have reviewed the pertinent positive findings in the review of symptoms with the patient.    Past Medical History:     IBS: diarrhea and constipation  anxiety    Past Surgical History:  See above    Health Maintenance:  Health Maintenance Due   Topic Date Due    ADVANCE CARE PLANNING  Never done    DEPRESSION ACTION PLAN  Never done    Pneumococcal Vaccine: Pediatrics (0 to 5 Years) and At-Risk Patients (6 to 64 Years) (1 of 2 - PCV) Never done    ZOSTER IMMUNIZATION (1 of 2) Never done    HEPATITIS B IMMUNIZATION (1 of 3 - 19+ 3-dose series) Never done    COVID-19 Vaccine (3 - Pfizer risk series) 11/24/2021    INFLUENZA VACCINE (1) Never done    HPV TEST  08/23/2024    PAP  08/23/2024          She has had a history of abnormal Pap smears.  ASCUS    Last Mammogram: 1/2024 BiRads 0, calcifications of left breast. Biopsy done - negative for malignancy. Okay to return to regular screening.    Last Colonoscopy: 2021              Result: abnormal: polyps repeat in 5 years. Due in 2026.     TSH 2.29 2/2024     DEXA scan:  11/2023 normal    Current Medications:     has a current medication list which includes the following prescription(s): acetaminophen, bupropion, cyclobenzaprine, ibuprofen, lorazepam, meclizine, mirabegron, ondansetron, and sertraline.       Allergies:     Ciprofloxacin: dizzy       Social History:     Social History     Tobacco Use    Smoking status: Never     Passive exposure: Past    Smokeless tobacco: Never   Substance Use Topics    Alcohol use: Yes     Comment: Occasional 2x month       History   Drug Use Unknown       No tobacco, no drugs, occ ETOH    Family History:     The patient's family history is notable for the following:    No breast, colon or ovarian cancer    Physical Exam:     /79   Pulse 59   Temp 97.8  F (36.6  C) (Oral)   Resp 16   Wt 83.9 kg (185 lb)   LMP  (LMP Unknown)   SpO2 99%   BMI 31.76 kg/m    Body mass index is 31.76 kg/m .    General Appearance: healthy and alert, appears anxious, sweaty with hot flashes     HEENT:  No thyromegaly , no palpable nodules or masses        Cardiovascular: regular rate and rhythm, no gallops, rubs or murmurs     Respiratory: lungs clear, no rales, rhonchi or wheezes, normal diaphragmatic excursion    Musculoskeletal: extremities non tender and without edema    Neurological: normal gait, no gross defects     Psychiatric: appropriate mood and affect                               Hematological: normal cervical, supraclavicular and inguinal lymph nodes     Gastrointestinal:       abdomen soft, diffusely sore over lower abdomen, non-distended, no organomegaly or masses, vertical midline incision well healed.     Genitourinary: External genitalia and urethral meatus appears normal.  Vagina is smooth without nodularity or masses.  Cervix absent.  Bimanual exam reveal no masses, nodularity or fullness.  Rectovaginal exam confirms above findigns.      Assessment:    Orquidea Diggs is a 48 year old woman with a diagnosis of ESS with history of  letrozole x 3 years. Recently stopped in 3/2023 due to menopausal side effects. Having significant anxiety, depression now-multi-factorial in nature.   -Stress/urge incontinence-following with Dr. Tavares now.       Plan:     1.)    ESS: Stopped letrozole 3/2023 d/t menopause symptoms. Imaging today JOSR, reviewed with patient today.   - Patient would benefit from NP survivorship visit and Dr. Dobson consult, referrals already in place. Patient had previously canceled appointments due to family emergency, encouraged patient to call and reschedule these.  - Recommend q 6 mos visits and yearly CTCAP-due 3/2025  - Can call [facility] for acupuncture appointments with Functional Medicine, number provided.     2.) Genetic risk factors were assessed and the patient does not meet the qualifications for a referral.      3.) Labs and/or tests ordered include:  None today     4.) Health maintenance issues addressed today: annual health maintenance and non-gynecologic issues with PCP.     5.)  Mental health: continue to follow with therapist. See above for visit with Dr. Dobson and NP survivorship.      Thank you for allowing us to participate in the care of your patient.       Patient discussed with Dr. Chowdary.    Danisha Reyes MD  Gynecologic Oncology, PGY-4  03/14/2024 3:16 PM    Sheba Chowdary MD  Professor  Department of Ob/Gyn and Women's Health  Division of Gynecologic Oncology  [facility]  [phone]    I saw and evaluated the patient with the resident.  I edited and reviewed the above note. I have reviewed all pertinent imaging and labs on this patient.  Of a 30 minute appointment, more than 50% was spent in counseling the patient.      CC  Patient Care Team:  Sheba Jane APRN CNP as PCP - General (Family Medicine)  Sheba Chowdary MD as MD (Gynecologic Oncology)  Sheba Chowdary MD as Assigned Cancer Care Provider  Holly Moreno MD as Assigned PCP  Katie Hurd AuD  (Audiology)  Effie Hanson NP as Nurse Practitioner  Holly Moreno MD as Referring Physician (Family Medicine)  Basia Tavares MD as MD (Urology)  Basia Tavares MD as Assigned Surgical Provider  aMrk Pelaez MD as MD (Cardiovascular Disease)  SELF, REFERRED

## 2024-03-14 NOTE — LETTER
3/14/2024         RE: Orquidea Arevalo  [address] [address]        Dear Colleague,    Thank you for referring your patient, Orquidea Arevalo, to the [facility]. Please see a copy of my visit note below.                            Consult Notes on Referred Patient    Date: 23    RE: Orquidea Diggs  : 1975  EVER: 23    Orquidea Diggs is a very pleasant 48 year old woman with a diagnosis of ESS.  She presents today in follow up.    Today:  Patient is doing okay. Has been very fatigued for quite a while now and feels it is worsening. This is being worked up by her PCP. She just saw cardiology today for workup of her bradycardia to see if this is contributing - however they do not believe a cardiac source is the cause of her fatigue. She is going to see the sleep clinic as well. She has struggled with anxiety/depression significantly recently. Her stepfather just passed away from dementia and so this week has been very difficult for her.     She also notes a lot of crampy abdominal pain, which she feels is stool moving through her bowels. Sometimes this is very painful and difficult to manage. This comes and goes. She voids spontaneously, but has struggled with incontinence and follows with Dr. Tavares for this. Her urgency and frequency have improved since starting miragebron, but she is still having stress leakage so is planning to follow-up with Dr. Tavares again soon. She has persistent nausea which has been going on for a long time, but no emesis. She is able to eat and drink without issue. Denies chest pain, SOB.    Oncology History  Ms. Arevalo is a very pleasant 47 y.o. woman with the following oncologic history:    Oncology History   Sarcoma Endometrial Stromal (HCC)   Genetic Testing and Tumor Genotyping   None    Initial Diagnosis   Sarcoma Endometrial Stromal (HCC)    Symptoms began in 2019, right lower quadrant abdominal pain,  normal menstrual period. Ultrasound revealed multiple fibroids. Managed with Lupron considered but patient declined.    12/4/2019 Surgery and Procedures with OB/GYN  total laparoscopic hysterectomy, bilateral salpingectomy with only right oophorectomy. A presumed fibroid mass wrapped around a ligament. Left ovary appeared normal. 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 [place] did not see it involving the fallopian tube but such was recorded/reported.    ([facility]: 14.5 cm LGESS extending into 1 FT; LVSI +)    12/27/2019, CT chest abdomen pelvis shows right and left pelvic masses possibly representing metastatic disease. Chest imaging was not definitive for metastases with some enlarged but not significantly enlarged nodes:  Postoperative changes of hysterectomy, bilateral salpingectomy, and right  oophorectomy.     Within the right adnexal region there is a 3.9 x 3.3 cm hyperenhancing soft  tissue mass concerning for metastatic disease or local recurrence. Expansive  filling defect within the right internal iliac vein that extends cranially to  the level of the mid right common iliac vein (series 4, image 166; series 605,  image 65). The density is similar to the right adnexal mass, which is concerning  for tumor thrombus. No evidence of DVT in the right external iliac or common  femoral veins.    Within the left adnexa there is a 7.1 x 3.7 cm loculated cystic lesion.     Multiple tiny hypodense hepatic lesions most likely represent benign  hemangiomas/cysts, but are indeterminate for metastases. The gallbladder,  pancreas, adrenal glands, and kidneys are normal. Small splenules. No  lymphadenopathy in the abdomen or pelvis. Normal caliber small and large bowel.  No aggressive osseous lesions.    1/17/2020 Tumor Board   Path review: LGESS arising from the myometrium perhaps extending to uterine cornua but no evidence of the slides submitted that there is any  disease on a fallopian tube or ovary  Rad review: Some LGESS may have FDG update on PET; mediastinal node is suspicious   Biopsy of the mediastinal node and if positive, consider resection vs. adjuvant treatment (hormone therapy) after surgery  Return to the OR for resection of residual disease and LSO    2/10/2020 Surgery and Procedures:02/10/2020 EXPLORATORY LAPAROTOMY., LEFT OOPHORECTOMY, RESECTION PELVIC MASS., UPPER VAGINECTOMY, CYSTOSCOPY, INSERTION STENT URETER., Resection of right internal iliac vein with intravenous tumor. Repair of right common iliac vein., Lymphadenectomy     Surgery  Exlap, resection of pelvic disease remaining from original surgery elsewhere. Resection of tumor mass within the right pelvic iliac vein system.   Complete gross resection.   Stage IIB    3/7/2020 - Biological/Targeted/Hormone Therapy   Letrozole 2.5 daily. At first month, nausea is minimal. Hot flashes are ok. Working from home and staying active. 2-3 mi walking 5 days. CrossFit 40 min three times weekly. Baseline bone mineral density is good, no osteopenia.    3/2023: Letrozole stopped due to side effects     3/20/23: CT CAP    COMPARISON:  Multiple priors, most recently CT 06/11/2022     FINDINGS:     Prior hysterectomy, bilateral salpingo-oophorectomy, right iliac vein resection, and partial   vaginectomy for endometrial stromal sarcoma. Postsurgical changes and surgical clips in the pelvis.     There is tethering of the right distal ureter in the pelvis with mild associated urothelial   thickening and enhancement, for example series 1, image 111. Mild upstream hydroureter without   hydronephrosis. The contralateral ureter is normal. The bladder is within expected limits. No   recurrent soft tissue in the pelvis or along the iliac veins. No new adenopathy or free fluid.     Multiple hypoattenuating lesions in the liver are stable dating back to 01/22/2020 and likely   represent benign cysts. No suspicious hepatic masses.  Non-cirrhotic morphology of the liver with   patent portal and hepatic veins.     The gallbladder and biliary tree are normal. No suspicious pancreatic masses. The adrenal glands and   kidneys are normal. Normal spleen with 2 small splenules. The bowel is within expected limits.     Tiny fat-containing umbilical hernia. No aggressive osseous lesions.     A CT chest was performed at the same time which will be reported separately.     IMPRESSION:   1.  No findings of recurrence in the abdomen or pelvis.   2.  A CT chest was performed at the same time which will be reported separately.    CT chest:  FINDINGS:   This examination was performed in conjunction with a CT of the abdomen, which will be reported   separately.     No suspicious lung nodule. No bulky thoracic lymphadenopathy. No suspicious osseous lesion. No   pleural effusion. Interval left breast implant rupture. Right breast implant appears similar since   prior exam.     IMPRESSION:   1.  No thoracic metastatic disease.   2.  Interval left breast implant rupture.    11/2023: DXA scan  NORMAL. Bone mineral density measurements are within normal limits using T score.      Review of Systems:    I have reviewed the pertinent positive findings in the review of symptoms with the patient.    Past Medical History:     IBS: diarrhea and constipation  anxiety    Past Surgical History:  See above    Health Maintenance:  Health Maintenance Due   Topic Date Due    ADVANCE CARE PLANNING  Never done    DEPRESSION ACTION PLAN  Never done    Pneumococcal Vaccine: Pediatrics (0 to 5 Years) and At-Risk Patients (6 to 64 Years) (1 of 2 - PCV) Never done    ZOSTER IMMUNIZATION (1 of 2) Never done    HEPATITIS B IMMUNIZATION (1 of 3 - 19+ 3-dose series) Never done    COVID-19 Vaccine (3 - Pfizer risk series) 11/24/2021    INFLUENZA VACCINE (1) Never done    HPV TEST  08/23/2024    PAP  08/23/2024          She has had a history of abnormal Pap smears.  ASCUS    Last  Mammogram: 1/2024 BiRads 0, calcifications of left breast. Biopsy done - negative for malignancy. Okay to return to regular screening.    Last Colonoscopy: 2021              Result: abnormal: polyps repeat in 5 years. Due in 2026.     TSH 2.29 2/2024     DEXA scan: 11/2023 normal    Current Medications:     has a current medication list which includes the following prescription(s): acetaminophen, bupropion, cyclobenzaprine, ibuprofen, lorazepam, meclizine, mirabegron, ondansetron, and sertraline.       Allergies:     Ciprofloxacin: dizzy       Social History:     Social History     Tobacco Use    Smoking status: Never     Passive exposure: Past    Smokeless tobacco: Never   Substance Use Topics    Alcohol use: Yes     Comment: Occasional 2x month       History   Drug Use Unknown       No tobacco, no drugs, occ ETOH    Family History:     The patient's family history is notable for the following:    No breast, colon or ovarian cancer    Physical Exam:     /79   Pulse 59   Temp 97.8  F (36.6  C) (Oral)   Resp 16   Wt 83.9 kg (185 lb)   LMP  (LMP Unknown)   SpO2 99%   BMI 31.76 kg/m    Body mass index is 31.76 kg/m .    General Appearance: healthy and alert, appears anxious, sweaty with hot flashes     HEENT:  No thyromegaly , no palpable nodules or masses        Cardiovascular: regular rate and rhythm, no gallops, rubs or murmurs     Respiratory: lungs clear, no rales, rhonchi or wheezes, normal diaphragmatic excursion    Musculoskeletal: extremities non tender and without edema    Neurological: normal gait, no gross defects     Psychiatric: appropriate mood and affect                               Hematological: normal cervical, supraclavicular and inguinal lymph nodes     Gastrointestinal:       abdomen soft, diffusely sore over lower abdomen, non-distended, no organomegaly or masses, vertical midline incision well healed.     Genitourinary: External genitalia and urethral meatus appears normal.  Vagina  is smooth without nodularity or masses.  Cervix absent.  Bimanual exam reveal no masses, nodularity or fullness.  Rectovaginal exam confirms above findigns.      Assessment:    Orquidea Diggs is a 48 year old woman with a diagnosis of ESS with history of letrozole x 3 years. Recently stopped in 3/2023 due to menopausal side effects. Having significant anxiety, depression now-multi-factorial in nature.   -Stress/urge incontinence-following with Dr. Tavares now.       Plan:     1.)    ESS: Stopped letrozole 3/2023 d/t menopause symptoms. Imaging today JOSR, reviewed with patient today.   - Patient would benefit from NP survivorship visit and Dr. Dobson consult, referrals already in place. Patient had previously canceled appointments due to family emergency, encouraged patient to call and reschedule these.  - Recommend q 6 mos visits and yearly CTCAP-due 3/2025  - Can call [facility] for acupuncture appointments with Functional Medicine, number provided.     2.) Genetic risk factors were assessed and the patient does not meet the qualifications for a referral.      3.) Labs and/or tests ordered include:  None today     4.) Health maintenance issues addressed today: annual health maintenance and non-gynecologic issues with PCP.     5.)  Mental health: continue to follow with therapist. See above for visit with Dr. Dobson and NP survivorship.      Thank you for allowing us to participate in the care of your patient.       Patient discussed with Dr. Chowdary.    Danisha Reyes MD  Gynecologic Oncology, PGY-4  03/14/2024 3:16 PM    Sheba Chowdary MD  Professor  Department of Ob/Gyn and Women's Health  Division of Gynecologic Oncology  [facility]  [phone]    I saw and evaluated the patient with the resident.  I edited and reviewed the above note. I have reviewed all pertinent imaging and labs on this patient.  Of a 30 minute appointment, more than 50% was spent in counseling the  patient.      CC  Patient Care Team:  Sheba Jane APRN CNP as PCP - General (Family Medicine)

## 2024-03-15 ENCOUNTER — TELEPHONE (OUTPATIENT)
Dept: UROLOGY | Facility: CLINIC | Age: 49
End: 2024-03-15
Payer: COMMERCIAL

## 2024-03-15 NOTE — PATIENT INSTRUCTIONS
It was a pleasure seeing you in clinic today-please reach out if there are any further questions that arise following today's visit.  During business hours, you may reach me at [phone].  For urgent/emergent questions after business hours, you may reach the on-call Gynecologic Oncology Resident through the [facility]  at [phone].    All normal test results are usually communicated via letter or [employer] message.  Abnormal results (those that require a change in the previously discussed plan of care) are usually communicated via a phone call.    I recommend signing up for Leveler access if you have not already done so.  This allows you online access to your lab results and also helps you communicate efficiently with your clinic should any questions arise in your care.    Follow up appointment in 6 months with NP scheduling will call you to help make an appointment  Scheduling will help reschedule appointments with cancer rehab and surviorship     Dr Epi Goldberg RN  Phone:  [phone]  Fax:  [phone]

## 2024-03-15 NOTE — NURSING NOTE
Return appt in  6 months  [facility]  Check out note sent to scheduling 3/14/24

## 2024-03-15 NOTE — TELEPHONE ENCOUNTER
" Health Call Center    Phone Message    May a detailed message be left on voicemail: yes     Reason for Call: Pt calling to reschedule \"Nurse visit in BV for PVR, please give 3 day bladder diary and hat that time \" appt that she had to cancel.  Please call pt to reschedule.   Thank you    Action Taken: Message routed to:  Other: Uro    Travel Screening: Not Applicable                                                                      "

## 2024-03-28 ENCOUNTER — ORDERS ONLY (AUTO-RELEASED) (OUTPATIENT)
Dept: CARDIOLOGY | Facility: CLINIC | Age: 49
End: 2024-03-28
Payer: COMMERCIAL

## 2024-03-28 DIAGNOSIS — R00.1 BRADYCARDIA: ICD-10-CM

## 2024-04-02 ENCOUNTER — TRANSFERRED RECORDS (OUTPATIENT)
Dept: HEALTH INFORMATION MANAGEMENT | Facility: CLINIC | Age: 49
End: 2024-04-02
Payer: COMMERCIAL

## 2024-04-03 ENCOUNTER — PRE VISIT (OUTPATIENT)
Dept: UROLOGY | Facility: CLINIC | Age: 49
End: 2024-04-03
Payer: COMMERCIAL

## 2024-04-03 NOTE — TELEPHONE ENCOUNTER
Reason for visit: Cystoscopy and review UDS    Relevant information: OAB    Records/imaging/labs/orders: all appointments available    Pt called: no need for a call    At Rooming: standard prep    Elicia Horn CMA  4/3/2024  8:58 AM

## 2024-04-12 ENCOUNTER — PRE VISIT (OUTPATIENT)
Dept: UROLOGY | Facility: CLINIC | Age: 49
End: 2024-04-12
Payer: COMMERCIAL

## 2024-04-15 ENCOUNTER — TELEPHONE (OUTPATIENT)
Dept: CARDIOLOGY | Facility: CLINIC | Age: 49
End: 2024-04-15
Payer: COMMERCIAL

## 2024-04-15 PROCEDURE — 93244 EXT ECG>48HR<7D REV&INTERPJ: CPT | Performed by: INTERNAL MEDICINE

## 2024-04-15 NOTE — TELEPHONE ENCOUNTER
ZIO Monitor read 4-14-24  Single 4-beat run of VT.  5 SVT runs, up to 7 beats.  Lowest heart rate 41 bpm (sinus bradycardia), in the early a.m.  Patient activations correlated with sinus rhythm, though several appear accidental.    Echo scheduled 4-17-24.     Last Dr. Pelaez OV 3-14-24.   Sinus bradycardia in a patient who has been athletic for many years. Likely physiologic. She herself states that her heart rate has been in the 50s for many years and this is certainly the case when going back and reviewing her records. ECG shows sinus bradycardia with normal conduction and normal cardiac intervals. Will get a heart monitor to confirm.   PLAN:  Detailed discussion with patient.  I counseled her about physiologic bradycardia, reassured her that this is unlikely to be pathological or causative for her symptoms, as she is able to do high intensity exercises like CrossFit.  Also reassured her that bradycardia does not run in families.  And many patients in their late 70s need pacemakers which does not necessitate that she herself would get 1.  7-day Zio patch heart monitor.  Echocardiogram.  If abnormal results, we will contact her to follow-up with the cardiology LEÓN.  Otherwise, refer back to PCP.

## 2024-04-16 ENCOUNTER — OFFICE VISIT (OUTPATIENT)
Dept: NEUROSURGERY | Facility: CLINIC | Age: 49
End: 2024-04-16
Payer: COMMERCIAL

## 2024-04-16 ENCOUNTER — ANCILLARY PROCEDURE (OUTPATIENT)
Dept: GENERAL RADIOLOGY | Facility: CLINIC | Age: 49
End: 2024-04-16
Attending: NURSE PRACTITIONER
Payer: COMMERCIAL

## 2024-04-16 VITALS
HEIGHT: 66 IN | WEIGHT: 180 LBS | BODY MASS INDEX: 28.93 KG/M2 | SYSTOLIC BLOOD PRESSURE: 116 MMHG | DIASTOLIC BLOOD PRESSURE: 66 MMHG | HEART RATE: 58 BPM | OXYGEN SATURATION: 98 %

## 2024-04-16 DIAGNOSIS — M54.50 CHRONIC MIDLINE LOW BACK PAIN WITHOUT SCIATICA: Primary | ICD-10-CM

## 2024-04-16 DIAGNOSIS — G89.29 CHRONIC NECK PAIN: ICD-10-CM

## 2024-04-16 DIAGNOSIS — M89.8X1 PAIN OF RIGHT SCAPULA: ICD-10-CM

## 2024-04-16 DIAGNOSIS — G89.29 CHRONIC MIDLINE LOW BACK PAIN WITHOUT SCIATICA: Primary | ICD-10-CM

## 2024-04-16 DIAGNOSIS — M54.2 CHRONIC NECK PAIN: ICD-10-CM

## 2024-04-16 DIAGNOSIS — R20.2 NUMBNESS AND TINGLING OF RIGHT LEG: ICD-10-CM

## 2024-04-16 DIAGNOSIS — M51.369 BULGING LUMBAR DISC: ICD-10-CM

## 2024-04-16 DIAGNOSIS — R20.0 NUMBNESS AND TINGLING OF RIGHT LEG: ICD-10-CM

## 2024-04-16 PROCEDURE — 99204 OFFICE O/P NEW MOD 45 MIN: CPT | Performed by: NURSE PRACTITIONER

## 2024-04-16 PROCEDURE — 72040 X-RAY EXAM NECK SPINE 2-3 VW: CPT | Mod: TC | Performed by: RADIOLOGY

## 2024-04-16 ASSESSMENT — PAIN SCALES - GENERAL: PAINLEVEL: SEVERE PAIN (6)

## 2024-04-16 NOTE — PATIENT INSTRUCTIONS
~Spine Center Scheduling #[phone].  ~Please call our [facility] Spine Nurse Navigation #[phone] with any questions or concerns about your treatment plan, if symptoms worsen and you would like to be seen urgently, or if you have problems controlling bladder and bowel function.  ~For any future flareups or new symptoms, recommend follow-up in clinic or contact the nurse navigator line.  ~Please note that any My Chart messages may take multiple days for a response due to the high volume of patients seen in clinic.  Anything sent Thursday night or after will be answered the following week when able, as Yi Ramirez CNP does not work in clinic on Fridays.   ~Yi Ramirez CNP is at the [facility] on the first and third Tuesdays of the month only, otherwise primarily at the [facility].        ~You have been referred for Physical Therapy to [facility]. They will call you to schedule an appointment.      Scheduling phone number is [phone] [facility] [place] [place], or [place] location.  If you have not heard from the scheduling office within 2 business days, please call [phone] for ALL other locations.    Discussed the importance of core strengthening, ROM, stretching exercises and how each of these entities is important in decreasing pain and improving long term spine health.  The purpose of physical therapy is to teach you an individualized home exercise program.  These exercises need to be performed every day in order to decrease pain and prevent future occurrences of pain.

## 2024-04-16 NOTE — PROGRESS NOTES
ASSESSMENT: Orquidea Arevalo is a 48 year old female who presents for consultation at the request of PCP Sheba Jane, with a past medical history significant for chronic bilateral low back pain, lymphadenopathy, uterine leiomyoma, endometrial stromal sarcoma with hysterectomy 2019 and 2020, generalized anxiety disorder, major depressive disorder who presents today for new patient evaluation of:    -Chronic midline low back pain with intermittent flareups typically with 150 pound dead lift lifting with [employer].    -Intermittent numbness and tingling right foot, otherwise no current radicular component.    -Intermittent chronic neck pain.    -Right scapular pain    Patient is neurologically intact on exam. No myelopathic or red flag symptoms.         4/15/2024     6:16 PM   OSWESTRY DISABILITY INDEX   Count 10   Sum 15   Oswestry Score (%) 30 %            Diagnoses and all orders for this visit:  Chronic midline low back pain without sciatica  -     Physical Therapy  Referral; Future  Bulging lumbar disc  -     Physical Therapy  Referral; Future  Numbness and tingling of right leg  -     Physical Therapy  Referral; Future  Chronic neck pain  -     XR Cervical Spine 2/3 Views; Future  -     Physical Therapy  Referral; Future  Pain of right scapula  -     XR Cervical Spine 2/3 Views; Future  -     Physical Therapy  Referral; Future    PLAN:  Reviewed spine anatomy and disease process. Discussed diagnosis and treatment options with the patient today. A shared decision making model was used.  The patient's values and choices were respected. The following represents what was discussed and decided upon by the provider and the patient.      -DIAGNOSTIC TESTS:  Images were personally reviewed and interpreted and explained to patient today using spine model.   --Ordered cervical spine x-ray to further evaluate neck pain prior to MedX program.  --Did discuss that if symptoms  worsen or improve with physical therapy we could consider lumbar and/or cervical spine MRI at that time.  --CT chest abdomen 3/4/2024 with normal alignment thoracic and lumbar spine with normal lumbar lordosis, no spondylolisthesis noted.  Mild degenerative changes at L4-5 and L5-S1 with slight disc bulging.  No high-grade nerve compression appreciated.  Unable to visualize cervical spine on imaging.    -PHYSICAL THERAPY: Referral for cervical and lumbar MedX program [facility] spine [place] for intensive core strengthening as well as establishing home exercises for core strengthening.  Discussed the importance of core strengthening, ROM, stretching exercises with the patient and how each of these entities is important in decreasing pain.  Explained to the patient that the purpose of physical therapy is to teach the patient a home exercise program.  These exercises need to be performed every day in order to decrease pain and prevent future occurrences of pain.        -MEDICATIONS: No changes in medications at this time did advise patient to continue with ibuprofen cyclobenzaprine and acetaminophen as needed for flareups.  She is not taking anything consistently for pain at this time.  Discussed multiple medication options today with patient. Discussed risks, side effects, and proper use of medications. Patient verbalized understanding.    -INTERVENTIONS: No recommendations for injections.  Discussed risks and benefits of injections with patient today.    -PATIENT EDUCATION:  Total time of 46 minutes, on the day of service, spent with the patient, reviewing the chart, placing orders, and documenting.     -FOLLOW-UP:   Follow-up thredUP message for cervical spine x-ray results.    Advised patient to call the Spine Center if symptoms worsen or you have problems controlling bladder and bowel function.   ______________________________________________________________________    SUBJECTIVE:  HPI:  Orquidea Arevalo   Is a 48 year old female who presents today for new patient evaluation of low back pain midline lower lumbar spine that is been intermittent for many years however worse in the last 1 year with 4-5 episodes of flareups.  Typically her flareups have occurred when she has been working with [employer] and does 150 pound dead lifts.  She does report that today it is fairly tolerable but does get up to a 6 at its worst.  She has trialed chiropractic treatments for flareups which occasionally helps but she does not do this on a regular basis.  Currently denies any radiating lower extremity pain but she does report intermittent numbness and tingling into the right foot typically in the evening hours.  She occasionally reports right groin pain as well but otherwise typically her pain is midline lower lumbar spine.  She also endorses intermittent neck pain typically radiating into the right Po scapular area.  Otherwise denies any arm pain history or current, denies numbness or tingling sensations upper extremities.  Denies upper extremity weakness.  Denies any lower extremity weakness.  Denies any recent trips or falls or balance changes.  Denies bowel or bladder loss control.    -Treatment to Date: No prior spinal surgery or spinal injections.  No prior physical therapy.  Chiropractic treatments in the past, nothing recent.    -Medications:  Ibuprofen  Cyclobenzaprine as needed    Current Outpatient Medications   Medication Sig Dispense Refill    buPROPion (WELLBUTRIN XL) 150 MG 24 hr tablet Take 150 mg by mouth every morning      cyclobenzaprine (FLEXERIL) 10 MG tablet Take 1 tablet (10 mg) by mouth nightly as needed for muscle spasms 30 tablet 3    ibuprofen (ADVIL/MOTRIN) 200 MG tablet Take 200 mg by mouth every 4 hours as needed for pain      LORazepam (ATIVAN) 0.5 MG tablet Take 0.5 mg by mouth as needed      meclizine (ANTIVERT) 25 MG tablet Take 1 tablet (25 mg) by mouth 3 times daily as needed for dizziness 20 tablet 1     mirabegron (MYRBETRIQ) 25 MG 24 hr tablet Take 1 tablet (25 mg) by mouth daily 30 tablet 3    ondansetron (ZOFRAN) 4 MG tablet Take 1 tablet (4 mg) by mouth every 8 hours as needed for nausea or vomiting 30 tablet 4    sertraline (ZOLOFT) 50 MG tablet Take 75 mg by mouth daily      acetaminophen (TYLENOL) 500 MG tablet Take 500-1,000 mg by mouth every 6 hours as needed for mild pain (Patient not taking: Reported on 4/16/2024)       No current facility-administered medications for this visit.       Allergies   Allergen Reactions    Ciprofloxacin Dizziness       Past Medical History:   Diagnosis Date    Arthritis 2020    Joint pain in fingers, ankles, back, wrists, shoukders, etc    Atypical squamous cells of undetermined significance (ASCUS) on Papanicolaou smear of cervix     Formatting of this note might be different from the original.  02/04/2010 ASCUS/HPV Negative.    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    Depressive disorder 5/2023    Major depressive elisode and anxiety    Hydronephrosis         Patient Active Problem List   Diagnosis    Uterine leiomyoma    Lymphadenopathy    Generalized anxiety disorder    Endometrial stromal sarcoma (H)    Major depressive disorder, single episode, moderate (H)    Functional urinary incontinence    History of hysterectomy for cancer    Elevated serum creatinine    Chronic bilateral low back pain without sciatica    Fatigue, unspecified type    History of breast augmentation    Bradycardia       Past Surgical History:   Procedure Laterality Date    ABDOMEN SURGERY  2019 and 2029    Hysterectomy and tumour removal - BL oopherectomy    BREAST SURGERY  2007    Breast augmentation    COLONOSCOPY  2021    Polyps removed-repeat in 5 years    GENITOURINARY SURGERY  2001    Tibal ligation    HYSTERECTOMY VAGINAL, BILATERAL SALPINGO-OOPHERECTOMY, COMBINED      December 2019-  "and Feb 2020    HYSTERECTOMY, PAP NO LONGER INDICATED      at 43yo       Family History   Problem Relation Age of Onset    Hypertension Mother     Depression Mother     Obesity Mother     Heart Surgery Mother 70        PPM placement  - [place] in the 40s and then 130s?    Atrial fibrillation Mother     Hypertension Father     Other Cancer Father         Basal cell on face and neck    Substance Abuse Father     Mental Illness Brother         Boderline personality disorder    Substance Abuse Brother     Obesity Brother     Anxiety Disorder Maternal Grandmother     Diabetes Maternal Grandfather     Cancer Paternal Grandfather         ?       Reviewed past medical, surgical, and family history with patient found on new patient intake packet located in EMR Media tab.     SOCIAL HX: patient is , currently on disability.  Typically works out with [employer] but has not been doing as much lately.  Denies smoking/tobacco use.  Does report occasional alcohol use but denies history heavy drinker.  Denies recreational drug use.      ROS: Positive for joint pain, muscle pain, muscle fatigue, imbalance, dizziness, insomnia, excessive tiredness, anxiety/depression, diarrhea/constipation, abdominal pain, shortness of breath palpitations, ringing in ears, difficulty swallowing, headache, weight gain.  Specifically negative for bowel/bladder dysfunction, balance changes, headache, dizziness, foot drop, fevers, chills, appetite changes, nausea/vomiting, unexplained weight loss. Otherwise 13 systems reviewed are negative. Please see the patient's intake questionnaire from today for details.    OBJECTIVE:  /66 (BP Location: Right arm, Patient Position: Sitting, Cuff Size: Adult Regular)   Pulse 58   Ht 5' 6\" (1.676 m)   Wt 180 lb (81.6 kg)   LMP  (LMP Unknown)   SpO2 98%   BMI 29.05 kg/m      PHYSICAL EXAMINATION:    --CONSTITUTIONAL:  Vital signs as above.  No acute distress.  The patient is well nourished and well " groomed.  --PSYCHIATRIC:  Appropriate mood and affect. The patient is awake, alert, oriented to person, place, time and answering questions appropriately with clear speech.    --SKIN:  Skin over the face, bilateral lower extremities, and posterior torso is clean, dry, intact without rashes.    --RESPIRATORY: Normal rhythm and effort. No abnormal accessory muscle breathing patterns noted.   --STANDING EXAMINATION:  Normal lumbar lordosis noted, no lateral shift.  --MUSCULOSKELETAL: Range of motion is limited in forward flexion as well as back extension due to pain.  Tenderness to palpation midline lower lumbar spine.. Straight leg raising is negative to radicular pain. Sciatic notch non-tender.  --GROSS MOTOR: Gait is non-antalgic. Easily arises from a seated position.   --LOWER EXTREMITY MOTOR TESTING:  Plantar flexion left 5/5, right 5/5   Dorsiflexion left 5/5, right 5/5   Great toe MTP extension left 5/5, right 5/5  Knee flexion left 5/5, right 5/5  Knee extension left 5/5, right 5/5   Hip flexion left 5/5, right 5/5  --HIPS: Full range of motion bilaterally. Negative FABERs on the involved lower extremity.   --NEUROLOGICAL:  2/4 patellar, medial hamstring, and achilles reflexes bilaterally.  Sensation to light touch is intact in the bilateral L4, L5, and S1 dermatomes. Babinski is negative. No clonus.  Negative Kodak reflex bilaterally.  --VASCULAR:  Bilateral lower extremities are warm.  There is no pitting edema of the bilateral lower extremities.    RESULTS: Prior medical records from [facility] and Care Everywhere were reviewed today.    Imaging: Spine imaging was personally reviewed and interpreted today. The images were shown to the patient and the findings were explained using a spine model.      Reviewed CT chest abdomen with patient today.

## 2024-04-16 NOTE — TELEPHONE ENCOUNTER
Spoke to patient, reviewed ziopatch findings. Team 2 will follow up again with patient pending Dr Pelaez's review of her echo. Patient verbalized understanding.

## 2024-04-17 ENCOUNTER — TELEPHONE (OUTPATIENT)
Dept: CARDIOLOGY | Facility: CLINIC | Age: 49
End: 2024-04-17

## 2024-04-17 ENCOUNTER — HOSPITAL ENCOUNTER (OUTPATIENT)
Dept: CARDIOLOGY | Facility: CLINIC | Age: 49
Discharge: HOME | End: 2024-04-17
Attending: INTERNAL MEDICINE | Admitting: INTERNAL MEDICINE
Payer: COMMERCIAL

## 2024-04-17 DIAGNOSIS — R00.1 BRADYCARDIA: ICD-10-CM

## 2024-04-17 DIAGNOSIS — R53.82 CHRONIC FATIGUE: ICD-10-CM

## 2024-04-17 LAB — LVEF ECHO: NORMAL

## 2024-04-17 PROCEDURE — 93306 TTE W/DOPPLER COMPLETE: CPT

## 2024-04-17 PROCEDURE — 93306 TTE W/DOPPLER COMPLETE: CPT | Mod: 26 | Performed by: INTERNAL MEDICINE

## 2024-04-17 NOTE — TELEPHONE ENCOUNTER
Echo 4/17/2024 noted. Ordered for work up of bradycardia    Ziopatch showed SVT and 1 NSVT, arcadio in early AM    Echo:  Left ventricular systolic function is normal.  The visual ejection fraction is 55-60%.  No regional wall motion abnormalities noted.    Per Dr. Pelaez's dictation 3/14/2024:  Detailed discussion with patient.  I counseled her about physiologic bradycardia, reassured her that this is unlikely to be pathological or causative for her symptoms, as she is able to do high intensity exercises like CrossFit.  Also reassured her that bradycardia does not run in families.  And many patients in their late 70s need pacemakers which does not necessitate that she herself would get 1.  7-day Zio patch heart monitor.  Echocardiogram.    Will message Dr. Pelaez to review      [id number] reply from Dr. Pelaez:  Mark Pelaez MD Anderson, Daysi MCGREGOR RN  Cc: ASH[facility] Heart Team 2  Caller: Unspecified (2 days ago,  3:36 PM)  Nothing to do.  Please see my note from today and update patient.    Thank you.  SJ    Note:       Reviewed patient's recent Zio patch heart monitor.  It is benign.  No sustained arrhythmias.  She has a brief 4 beat run of wide complex tachycardia and occasional and brief PSVT.  Patient reported symptoms correlated with sinus rhythm and rare (less than 1%) ventricular ectopy.     Her echocardiogram was normal.     As discussed with patient at her clinic visit, her symptoms do not lend themselves to a cardiac etiology.  Cardiac testing is reassuring.  Patient to follow-up with PCP.  No additional cardiac testing or follow-up at this time.     Dr. MIKE Pelaez MD [facility]  Cardiology        Attempted to contact patient to review Dr. Pelaez's recommendations. Left a detailed message, patient to call back to let us know she received the update or to call if any further questions or concerns.

## 2024-04-19 ENCOUNTER — TELEPHONE (OUTPATIENT)
Dept: CARDIOLOGY | Facility: CLINIC | Age: 49
End: 2024-04-19
Payer: COMMERCIAL

## 2024-04-19 ENCOUNTER — TELEPHONE (OUTPATIENT)
Dept: UROLOGY | Facility: CLINIC | Age: 49
End: 2024-04-19
Payer: COMMERCIAL

## 2024-04-19 NOTE — TELEPHONE ENCOUNTER
Voicemail left for patient.  Patient is scheduled for Urodynamics testing with Monica Zhou PA-C on 4/25 and has had some abnormal UA's (years ago).  Just checking in with patient to see if she is currently having any UTI symptoms.  I asked for a call back if only having symptoms- if no symptoms, no need for a return call.  Phone number to the Urology department provided.    Ondina Evans, CMA

## 2024-04-19 NOTE — CONFIDENTIAL NOTE
Reviewed patient's recent Zio patch heart monitor.  It is benign.  No sustained arrhythmias.  She has a brief 4 beat run of wide complex tachycardia and occasional and brief PSVT.  Patient reported symptoms correlated with sinus rhythm and rare (less than 1%) ventricular ectopy.    Her echocardiogram was normal.    As discussed with patient at her clinic visit, her symptoms do not lend themselves to a cardiac etiology.  Cardiac testing is reassuring.  Patient to follow-up with PCP.  No additional cardiac testing or follow-up at this time.    Dr. MIKE Pelaez MD [facility]  Cardiology

## 2024-04-22 ENCOUNTER — TELEPHONE (OUTPATIENT)
Dept: CARDIOLOGY | Facility: CLINIC | Age: 49
End: 2024-04-22
Payer: COMMERCIAL

## 2024-04-22 NOTE — TELEPHONE ENCOUNTER
Health Call Center    Phone Message    May a detailed message be left on voicemail: yes     Reason for Call: Other: Nayla from [facility] called and needs the [employer] and clinic notes on why the patch was ordered to be faxed to [phone] as soon as possible.  [facility] did send 2 requests prior to this call to request this information for insurance purposes but have not heard anything back.    They need the Prior Authorization reporting Docs faxed.     Action Taken: Message routed to:  [facility] ([facility] cardio    Travel Screening: Not Applicable    Thank you!  [facility]

## 2024-04-22 NOTE — TELEPHONE ENCOUNTER
Called Nayla [phone] to [phone] so they can request the needed records.

## 2024-04-23 NOTE — PROGRESS NOTES
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  She completed surgical treatment with TLH, BS, RO 2019 and XL, tumor debulking, LO 2/10/2020.  She was on letrazole maintenance x 3 years then stopped 3/2023 due to side effects.  She presents today for a survivorship visit.        She comes to the clinic accompanied by herself. She is feeling well overall but admits to \"not trusting her body and not trusting the healthcare system\" a lot of the time.  She has developed increased back and joint pain since her hysterectomy and maintenance letrozole (self discontinued 3/2023).  She has been doing hot yoga and walking for exercise as she can not tolerate her cross fit exercise regimen since her surgery.  She does drink alcohol \"maybe a couple glasses of wine\" once or twice a month.  She does not smoke.  She is currently in therapy and on sertraline.  She has referral to PM&R and will see Dr. Dobson next week for fatigue and cognition.  She has hot flashes and night sweats.  She remembers that her grandmother really struggled with menopausal symptoms.  She has been on Effexor for her hot flashes but now is in sertraline more for her mental health.  She is interested in other strategies to help menopausal symptoms.  She is undergoing a court case for long term disability and is interested in additional resources.  She has struggled with weight gain due to surgical menopause and would like to talk with a dietitian.    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 Resection of tumor mass within the right pelvic ileac vein system.   Complete gross resection.   Surgery Date: 2/10/2020    Complications: None  Genetic testing: None      Health Maintenance:  Cervical cancer screenin19 ASC-US, HPV negative; 10/31/12 ASC-US, HPV negative; 2/4/10 ASC-US, HPV negative; 19 cervical pathology was negative for malignancy or dysplasia on hysterectomy specimen  Mammogram:24  Colonoscopy: 21, 5 years  DEXA: 23, normal      Past Medical History:   Diagnosis Date    Arthritis     Joint pain in fingers, ankles, back, wrists, shoukders, etc    Atypical squamous cells of undetermined significance (ASCUS) on Papanicolaou smear of cervix     Formatting of this note might be different from the original.  2010 ASCUS/HPV Negative.    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    Depressive disorder 2023    Major depressive elisode and anxiety    Hydronephrosis        Past Surgical History:   Procedure Laterality Date    ABDOMEN SURGERY   and     Hysterectomy and tumour removal - BL oopherectomy    BREAST SURGERY      Breast augmentation    COLONOSCOPY      Polyps removed-repeat in 5 years    GENITOURINARY SURGERY      Tibal ligation    HYSTERECTOMY VAGINAL, BILATERAL SALPINGO-OOPHERECTOMY, COMBINED      2019- and 2020    HYSTERECTOMY, PAP NO LONGER INDICATED      at 43yo       Social History     Socioeconomic History    Marital status:      Spouse name: Not on file    Number of children: Not on file    Years of education: Not on file    Highest education level: Not on file   Occupational History    Not on file   Tobacco Use    Smoking status: Never      Passive exposure: Past    Smokeless tobacco: Never   Substance and Sexual Activity    Alcohol use: Yes     Comment: Occasional 2x month    Drug use: Never    Sexual activity: Yes     Partners: Male     Birth control/protection: Post-menopausal   Other Topics Concern    Parent/sibling w/ CABG, MI or angioplasty before 65F 55M? No   Social History Narrative    Not on file     Social Determinants of Health     Financial Resource Strain: Low Risk  (2/19/2024)    Financial Resource Strain     Within the past 12 months, have you or your family members you live with been unable to get utilities (heat, electricity) when it was really needed?: No   Food Insecurity: Low Risk  (2/19/2024)    Food Insecurity     Within the past 12 months, did you worry that your food would run out before you got money to buy more?: No     Within the past 12 months, did the food you bought just not last and you didn t have money to get more?: No   Transportation Needs: Low Risk  (2/19/2024)    Transportation Needs     Within the past 12 months, has lack of transportation kept you from medical appointments, getting your medicines, non-medical meetings or appointments, work, or from getting things that you need?: No   Physical Activity: Sufficiently Active (2/19/2024)    Exercise Vital Sign     Days of Exercise per Week: 5 days     Minutes of Exercise per Session: 40 min   Stress: Stress Concern Present (2/19/2024)    [employer] - Occupational Stress Questionnaire     Feeling of Stress : Very much   Social Connections: Unknown (2/19/2024)    Social Connection and Isolation Panel [NHANES]     Frequency of Communication with Friends and Family: Not on file     Frequency of Social Gatherings with Friends and Family: Once a week     Attends Latter day Services: Not on file     Active Member of Clubs or Organizations: Not on file     Attends Club or Organization Meetings: Not on file     Marital Status: Not on file    Interpersonal Safety: Low Risk  (2/19/2024)    Interpersonal Safety     Do you feel physically and emotionally safe where you currently live?: Yes     Within the past 12 months, have you been hit, slapped, kicked or otherwise physically hurt by someone?: No     Within the past 12 months, have you been humiliated or emotionally abused in other ways by your partner or ex-partner?: No   Housing Stability: Low Risk  (2/19/2024)    Housing Stability     Do you have housing? : Yes     Are you worried about losing your housing?: No       Family History   Problem Relation Age of Onset    Hypertension Mother     Depression Mother     Obesity Mother     Heart Surgery Mother 70        PPM placement  - [place] in the 40s and then 130s?    Atrial fibrillation Mother     Hypertension Father     Other Cancer Father         Basal cell on face and neck    Substance Abuse Father     Mental Illness Brother         Boderline personality disorder    Substance Abuse Brother     Obesity Brother     Anxiety Disorder Maternal Grandmother     Diabetes Maternal Grandfather     Cancer Paternal Grandfather         ?         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  She completed surgical treatment with TLH, BS, RO 12/4/2019 and XL, tumor debulking, LO 2/10/2020.  She was on letrazole maintenance x 3 years then stopped 3/2023 due to side effects.  She presents today for a survivorship visit.      1.) ESS: Completed treatment and now in surveillance. Discussed surveillance recommendations as she is 4 years out from her treatment- to be seen every 6 months until she is 5 years out from treatment (through 2/2025) with a pelvic/rectal exam.  Per Dr. Chowdary annual CT CAP (due 3/2025).  Discussed signs and symptoms of a recurrence, including but not limited to anything that represents a brand new or persistent symptom, anything that you are worried about that might be related to your cancer coming back, abdominal/back pain or bloating, vaginal bleeding, unexplained weight gain or loss, changes in bowel or bladder habits.  Referrals for social work and nutrition placed they will contact her to schedule.    2.) Post-surgical side effects:  Surgical menopause: Removal of the ovaries causes menopause if you were not already in menopause. This can lead to side effects of hot flashes, sexual dysfunction, and vaginal dryness due to the lack of estrogen from the ovaries. This can also put you at risk for osteoporosis and heart disease.  Vaginal dryness: Vaginal dryness can occur due to lack of estrogen and can cause general discomfort as well as painful intercourse. Using a daily vaginal moisturizer (such as Replens or Hyalo-Gyn) may help restore baseline moisture and minimize discomfort. Using a silicone based lubricant (such as Uberlube) may minimize pain with intercourse.  Sexual dysfunction: Many women experience change in libido, difficulty with arousal, and changes in orgasm after treatment. There are many factors that influence this. Having an open and honest discussion with your partner regarding your needs and feeling may be beneficial. Scheduling time for dates and maintaining physical contact without the end goal of intercourse may enhance intimacy. Let your provider know if you would like to see the sexual health center or if you would like counseling. Pelvic floor physical therapy may be helpful for  painful intercourse if pain is due to a muscular issue rather than vaginal dryness.  Nerve damage: Nerve damage may occur during surgery which can cause numbness and tingling, commonly noticed in your upper thigh. This often improves with time but may take several months to years. Let your provider know if you are having pain or weakness in the affected area.  Lymphedema: Lymphedema may occur with removal of lymph nodes and is characterized by swelling. While lymphedema may be permanent, using compression, performing lymphedema massage, and seeing a lymphedema specialist may be helpful in managing these symptoms. Let your provider know if you experience redness, pain, or warmth in your legs along with the swelling as these are signs of a blood clot.  Lymphocele: Fluid collections known as lymphoceles may occur after surgery. These typically resolve on their own over time, however, they can grow or become infected. In that case, the lymphocele may possibly be able to be drained, however, there is a chance they can still come back. Let your provider know if you are having worsening abdominal/pelvic pain, swelling, or fevers >100.4F.    3.) Late effects:  Reviewed possible late effects including osteoporosis, secondary cancers, development of problems with the kidneys, development of problems with hearing, elevated cholesterol, Raynaud's phenomenon, and sexuality concerns.    4.) Emotional well being: Discussed that being diagnosed with cancer and cancer treatment can be quite traumatic for some people especially if they have and lose friends to cancer.  The clinic offers resources such as therapists, social work, palliative care, and chaplains if this would be useful.  These services remain available even after treatment.     5.) Genetic counseling: Not indicated.    6.) Health maintenance:   Annual wellness exams: It is important that you establish care with a primary care provider and follow up for annual wellness  exams as well as any other non-cancer related conditions you may have.  Cervical cancer screening: If you have had a hysterectomy and have no history of abnormal Pap smears, you no longer need Pap smears. However, you will still have pelvic exams according to your surveillance plan.  Breast cancer screening: It is recommended to have yearly mammograms after the age of 40-50. Discuss with your primary care provider which option is appropriate for you. Monthly self breast exams are no longer recommended, however, it is important for you to have an awareness of your normal breast tissue in order to identify any concerning changes, such as changes in skin texture, nipple appearance, or lumps.  Colon cancer screening: It is important to screen for colon cancer after the age of 45 or earlier if there is a family history of colon cancer. There are different options, including yearly FIT testing, flexible sigmoidoscopy, and colonoscopy. Please discuss with your primary care provider which option is right for you.   Osteoporosis screening: Certain cancer treatments including pelvic radiation and chemotherapy as well as menopause may put you at risk for osteoporosis, which is a thinning of your bones that can lead to fractures. DEXA scans to evaluate your bone density are recommended every 2 years after the age of 65 and earlier if you had full pelvic radiation.    7.) Healthy lifestyle:  Weight: It is recommended that you maintain a healthy weight and body mass index (BMI). Excess weight can put you at risk for developing certain cancers, heart disease, liver disease, and diabetes. The recommended BMI is between 18-25. Your BMI today is 29.84 kg/m .  Exercise: Regular exercise may improve your cardiovascular health and overall well being. 150 minutes of moderate intensity exercise are recommended per week and strength/resistance exercise at least 2 times per week.  Diet: Eat foods high in nutrients in amounts that help you  get to and stay at a healthy weight, a variety of fresh fruits and vegetables in a variety of colors, whole grains, and limiting red meats, sugar sweetened beverages, and highly processed foods.  Skin: Keep your body well hydrated and skin well hydrated with lotion, avoid excessive cold and heat exposures, practice sun safety by wearing sunscreen, avoiding being outside for extended periods during peak sun times (10 am-4 pm), and wearing protective clothing or seeking out shade while outside.  Examine your skin regularly and if you notice changes have them evaluated by your PCP or dermatologist.  Tobacco: Smoking is harmful to your health and increased your risk of certain cancers, heart disease, and lung disease. It is recommended that you do not smoke or take in tobacco. Please discuss with your team if you would like information on quitting smoking. Www.quitplan.org may be a helpful resource.  Alcohol: It is recommended that women have no more than two drinks in one setting or four drinks per week.     8.) Vasomotor symptoms of menopause: She has hot flashes and night sweats.  She remembers that her grandmother really struggled with menopausal symptoms.  She has been on Effexor for her hot flashes but now is in sertraline more for her mental health.  She is interested in other strategies to help menopausal symptoms.  Discussed life style modifications and resources provided to her as part of the [employer] paperwork she received.  She is interested in trying gabapentin.  Orders placed for a slow taper up and she should contact the clinic if she notices side effects or she does not have desired effect.  She is following with urology for incontinence and overactive bladder if gabapentin is not helpful could try ditropan which may be helpful for her menopausal and overactive bladder symptoms.    9.) Patient verbalized understanding of and agreement with plan. She was given copies of her treatment summary, surgical  pathology, and survivorship care plan.  Questions answered to the best of my ability.      60 minutes spent on the date of the encounter doing chart review, history and exam, documentation, and further activities as noted above.      Anu Herrera DNP, APRN, FNP-C, AOP  Oncology Nurse Practitioner   Division of Gynecologic Oncology  Pager: [phone]

## 2024-04-25 ENCOUNTER — ALLIED HEALTH/NURSE VISIT (OUTPATIENT)
Dept: UROLOGY | Facility: CLINIC | Age: 49
End: 2024-04-25
Payer: COMMERCIAL

## 2024-04-25 ENCOUNTER — ANCILLARY PROCEDURE (OUTPATIENT)
Dept: RADIOLOGY | Facility: AMBULATORY SURGERY CENTER | Age: 49
End: 2024-04-25
Attending: PHYSICIAN ASSISTANT
Payer: COMMERCIAL

## 2024-04-25 ENCOUNTER — ONCOLOGY SURVIVORSHIP VISIT (OUTPATIENT)
Dept: ONCOLOGY | Facility: CLINIC | Age: 49
End: 2024-04-25
Attending: OBSTETRICS & GYNECOLOGY
Payer: COMMERCIAL

## 2024-04-25 ENCOUNTER — OFFICE VISIT (OUTPATIENT)
Dept: UROLOGY | Facility: CLINIC | Age: 49
End: 2024-04-25
Payer: COMMERCIAL

## 2024-04-25 VITALS
SYSTOLIC BLOOD PRESSURE: 131 MMHG | HEART RATE: 83 BPM | HEIGHT: 66 IN | WEIGHT: 184 LBS | BODY MASS INDEX: 29.57 KG/M2 | DIASTOLIC BLOOD PRESSURE: 87 MMHG

## 2024-04-25 VITALS
OXYGEN SATURATION: 99 % | BODY MASS INDEX: 29.84 KG/M2 | TEMPERATURE: 98.4 F | HEART RATE: 64 BPM | RESPIRATION RATE: 16 BRPM | DIASTOLIC BLOOD PRESSURE: 79 MMHG | SYSTOLIC BLOOD PRESSURE: 115 MMHG | WEIGHT: 184.9 LBS

## 2024-04-25 DIAGNOSIS — N39.46 MIXED STRESS AND URGE URINARY INCONTINENCE: Primary | ICD-10-CM

## 2024-04-25 DIAGNOSIS — M79.18 MYALGIA OF PELVIC FLOOR: ICD-10-CM

## 2024-04-25 DIAGNOSIS — N32.81 OAB (OVERACTIVE BLADDER): ICD-10-CM

## 2024-04-25 DIAGNOSIS — C54.1 ENDOMETRIAL STROMAL SARCOMA (H): ICD-10-CM

## 2024-04-25 DIAGNOSIS — M62.89 HIGH-TONE PELVIC FLOOR DYSFUNCTION: ICD-10-CM

## 2024-04-25 DIAGNOSIS — N95.1 VASOMOTOR SYMPTOMS DUE TO MENOPAUSE: ICD-10-CM

## 2024-04-25 DIAGNOSIS — R39.89 URINARY PROBLEM: ICD-10-CM

## 2024-04-25 DIAGNOSIS — C54.1 ENDOMETRIAL STROMAL SARCOMA (H): Primary | ICD-10-CM

## 2024-04-25 LAB
ALBUMIN UR-MCNC: NEGATIVE MG/DL
APPEARANCE UR: CLEAR
BILIRUB UR QL STRIP: NEGATIVE
COLOR UR AUTO: YELLOW
GLUCOSE UR STRIP-MCNC: NEGATIVE MG/DL
HGB UR QL STRIP: ABNORMAL
KETONES UR STRIP-MCNC: NEGATIVE MG/DL
LEUKOCYTE ESTERASE UR QL STRIP: NEGATIVE
NITRATE UR QL: NEGATIVE
PH UR STRIP: 7 [PH] (ref 5–8)
SP GR UR STRIP: 1.01 (ref 1–1.03)
UROBILINOGEN UR STRIP-ACNC: 0.2 E.U./DL

## 2024-04-25 PROCEDURE — 99417 PROLNG OP E/M EACH 15 MIN: CPT | Performed by: NURSE PRACTITIONER

## 2024-04-25 PROCEDURE — 52000 CYSTOURETHROSCOPY: CPT | Performed by: UROLOGY

## 2024-04-25 PROCEDURE — 51728 CYSTOMETROGRAM W/VP: CPT | Performed by: PHYSICIAN ASSISTANT

## 2024-04-25 PROCEDURE — 99215 OFFICE O/P EST HI 40 MIN: CPT | Performed by: NURSE PRACTITIONER

## 2024-04-25 PROCEDURE — 51741 ELECTRO-UROFLOWMETRY FIRST: CPT | Performed by: PHYSICIAN ASSISTANT

## 2024-04-25 PROCEDURE — 51784 ANAL/URINARY MUSCLE STUDY: CPT | Performed by: PHYSICIAN ASSISTANT

## 2024-04-25 PROCEDURE — 51797 INTRAABDOMINAL PRESSURE TEST: CPT | Performed by: PHYSICIAN ASSISTANT

## 2024-04-25 PROCEDURE — 74430 CONTRAST X-RAY BLADDER: CPT | Performed by: PHYSICIAN ASSISTANT

## 2024-04-25 PROCEDURE — 81003 URINALYSIS AUTO W/O SCOPE: CPT | Performed by: PATHOLOGY

## 2024-04-25 PROCEDURE — 51600 INJECTION FOR BLADDER X-RAY: CPT | Performed by: PHYSICIAN ASSISTANT

## 2024-04-25 PROCEDURE — 99213 OFFICE O/P EST LOW 20 MIN: CPT | Mod: 25 | Performed by: UROLOGY

## 2024-04-25 RX ORDER — BUPROPION HYDROCHLORIDE 300 MG/1
TABLET ORAL
COMMUNITY
Start: 2024-04-23

## 2024-04-25 RX ORDER — TOPIRAMATE 25 MG/1
TABLET, FILM COATED ORAL
COMMUNITY
Start: 2024-01-20 | End: 2024-07-29

## 2024-04-25 RX ORDER — GABAPENTIN 100 MG/1
CAPSULE ORAL
Qty: 675 CAPSULE | Refills: 0 | Status: SHIPPED | OUTPATIENT
Start: 2024-04-25 | End: 2024-05-23

## 2024-04-25 RX ORDER — LIDOCAINE HYDROCHLORIDE 20 MG/ML
JELLY TOPICAL ONCE
Status: COMPLETED | OUTPATIENT
Start: 2024-04-25 | End: 2024-04-25

## 2024-04-25 RX ORDER — CEPHALEXIN 500 MG/1
500 CAPSULE ORAL ONCE
Status: COMPLETED | OUTPATIENT
Start: 2024-04-25 | End: 2024-04-25

## 2024-04-25 RX ADMIN — LIDOCAINE HYDROCHLORIDE: 20 JELLY TOPICAL at 13:39

## 2024-04-25 RX ADMIN — CEPHALEXIN 500 MG: 500 CAPSULE ORAL at 10:16

## 2024-04-25 ASSESSMENT — PAIN SCALES - GENERAL
PAINLEVEL: MODERATE PAIN (4)
PAINLEVEL: NO PAIN (0)

## 2024-04-25 NOTE — NURSING NOTE
"Oncology Rooming Note    April 25, 2024 7:02 AM   Orquidea Arevalo is a 48 year old female who presents for:    Chief Complaint   Patient presents with    Oncology Clinic Visit     [facility]      Initial Vitals: /79 (BP Location: Right arm, Patient Position: Sitting, Cuff Size: Adult Regular)   Pulse 64   Temp 98.4  F (36.9  C) (Oral)   Resp 16   Wt 83.9 kg (184 lb 14.4 oz)   LMP  (LMP Unknown)   SpO2 99%   BMI 29.84 kg/m   Estimated body mass index is 29.84 kg/m  as calculated from the following:    Height as of 4/16/24: 1.676 m (5' 6\").    Weight as of this encounter: 83.9 kg (184 lb 14.4 oz). Body surface area is 1.98 meters squared.  Moderate Pain (4) Comment: Data Unavailable   No LMP recorded (lmp unknown). Patient has had a hysterectomy.  Allergies reviewed: Yes  Medications reviewed: Yes    Medications: Medication refills not needed today.  Pharmacy name entered into [facility]: [facility] [address] [facility] [address]    Frailty Screening:   Is the patient here for a new oncology consult visit in cancer care? 2. No      Clinical concerns: none.       Arsenio Ford"

## 2024-04-25 NOTE — NURSING NOTE
"  Chief Complaint   Patient presents with    Urodynamics Study     Urinary frequency/urgency/incontinence       Blood pressure 131/87, pulse 83, height 1.676 m (5' 6\"), weight 83.5 kg (184 lb). Body mass index is 29.7 kg/m .    Patient Active Problem List   Diagnosis    Uterine leiomyoma    Lymphadenopathy    Generalized anxiety disorder    Endometrial stromal sarcoma (H)    Major depressive disorder, single episode, moderate (H)    Functional urinary incontinence    History of hysterectomy for cancer    Elevated serum creatinine    Chronic bilateral low back pain without sciatica    Fatigue, unspecified type    History of breast augmentation    Bradycardia       Allergies   Allergen Reactions    Ciprofloxacin Dizziness       Current Outpatient Medications   Medication Sig Dispense Refill    buPROPion (WELLBUTRIN XL) 300 MG 24 hr tablet       cyclobenzaprine (FLEXERIL) 10 MG tablet Take 1 tablet (10 mg) by mouth nightly as needed for muscle spasms 30 tablet 3    gabapentin (NEURONTIN) 100 MG capsule Take 1 capsule (100 mg) by mouth at bedtime for 7 days, THEN 1 capsule (100 mg) 2 times daily for 7 days, THEN 1 capsule (100 mg) 3 times daily for 7 days, THEN 2 capsules (200 mg) 3 times daily for 7 days, THEN 3 capsules (300 mg) 3 times daily for 62 days. Continue 300 mg three times daily.  If you experience side effects decrease to last tolerated dose and contact the clinic.  If not receiving desired effect contact the clinic. 675 capsule 0    ibuprofen (ADVIL/MOTRIN) 200 MG tablet Take 200 mg by mouth every 4 hours as needed for pain      LORazepam (ATIVAN) 0.5 MG tablet Take 0.5 mg by mouth as needed      meclizine (ANTIVERT) 25 MG tablet Take 1 tablet (25 mg) by mouth 3 times daily as needed for dizziness 20 tablet 1    mirabegron (MYRBETRIQ) 25 MG 24 hr tablet Take 1 tablet (25 mg) by mouth daily 30 tablet 3    ondansetron (ZOFRAN) 4 MG tablet Take 1 tablet (4 mg) by mouth every 8 hours as needed for nausea or " vomiting 30 tablet 4    sertraline (ZOLOFT) 50 MG tablet Take 75 mg by mouth daily      topiramate (TOPAMAX) 25 MG tablet TAKE 1 TABLET AT BEDTIME FOR 7 DAYS THEN 1 TWICE DAILY FOR 7 DAYS THEN 2 TABLETS BY MOUTH AT BEDTIME AND 1 IN THE MORNING FOR 7 DAYS THEN 2 TWICE DAILY      acetaminophen (TYLENOL) 500 MG tablet Take 500-1,000 mg by mouth every 6 hours as needed for mild pain (Patient not taking: Reported on 2024)         Social History     Tobacco Use    Smoking status: Never     Passive exposure: Past    Smokeless tobacco: Never   Substance Use Topics    Alcohol use: Yes     Comment: Occasional 2x month    Drug use: Never       Invasive Procedure Safety Checklist:    Procedure: Urodynamics    Action: Complete sections and checkboxes as appropriate.    Pre-procedure:    1. Patient ID Verified with 2 identifiers ([place] and  or MRN) : YES    2. Procedure and site verified with patient/designee (when able) : YES    3. Accurate consent documentation in medical record : YES    4. H&P (or appropriate assessment) documented in medical record : N/A    H&P must be up to 30 days prior to procedure an updated within 24 hours of Procedure as applicable.     5. Relevant diagnostic and radiology test results appropriately labeled and displayed as applicable : YES    6. Blood products, implants, devices, and/or special equipment available for the procedure as applicable : YES    7. Procedure site(s) marked with provider initials [Exclusions: none] : NO    8. Marking not required. Reason : Yes  Procedure does not require site marking    Time Out:     Time-Out performed immediately prior to starting procedure, including verbal and active participation of all team members addressing: YES    1. Correct patient identity.  2. Confirmed that the correct side and site are marked.  3. An accurate procedure to be done.  4. Agreement on the procedure to be done.  5. Correct patient position.  6. Relevant images and results are  properly labeled and appropriately displayed.  7. The need to administer antibiotics or fluids for irrigation purposes during the procedure as applicable.  8. Safety precautions based on patient history or medication use.    During Procedure: Verification of correct person, site, and procedure occurs any time the responsibility for care of the patient is transferred to another member of the care team.          The following medication was given:     MEDICATION:  Keflex (Cefalexin)  ROUTE: PO  SITE: Medication was given orally  DOSE: 500mg  LOT #: [id number]  : [employer]   EXPIRATION DATE: 10/2025  NDC#:  [id number])[id number]   Was there drug waste? No      The following medication was given:     MEDICATION:  Omnipaque (Iohexol Injection) (240mgI/mL)  ROUTE: Provider Administered  SITE: Provider Administered via catheter  DOSE: 100mL  LOT #: [id number]  : [employer]  EXPIRATION DATE: 4/11/2026  NDC#: 5769-8983-82   Was there drug waste? No        Ondina Evans [facility]  4/25/2024  9:07 AM

## 2024-04-25 NOTE — PROGRESS NOTES
PREPROCEDURE DIAGNOSES:    1. Mixed urinary incontinence  2. Overactive bladder  3. History of endometrial cancer s/p JIMBO/BSO    POSTPROCEDURE DIAGNOSES:  -Maximum cystometric capacity 456 mL with normal filling sensations.  -Good bladder compliance without detrusor overactivity.  -Stress incontinence at capacity with ALPP 155 cm H2O.  -Maximum detrusor contraction during voiding reaches 16 cm H2O.She voids 456 mL with Qmax 26 ml/s, continuous but slightly fluctuating flow curve, increased EMG activity, complete bladder emptying (PVR 0 mL), and no evidence for bladder outlet obstruction (BOOI -49.3).   -Fluoroscopy reveals a mildly trabeculated bladder wall without diverticuli or VUR. The bladder neck is closed during filling with some hypermobility noted during stress maneuvers; voiding images unavailable since patient transferred to [facility] to void.     PROCEDURE:    1. Uroflowmetry.  2. Sterile urethral catheterization for measurement of postvoid residual urine volume.  3. Complex filling cystometrogram with measurement of bladder and rectal pressures.  4. Complex voiding cystometrogram with measurement of bladder and rectal pressures.  5. Electromyography of the pelvic floor during urodynamics.  6. Fluoroscopic imaging of the bladder during urodynamics, at least 3 views.    7. Interpretation of urodynamics and flouroscopic imaging.      INDICATIONS FOR PROCEDURE:  Ms. Orquidea Arevalo is a pleasant 48 year old female with mixed urinary incontinence and overactive bladder. Baseline video urodynamic assessment is requested today by Dr. Tavares to better characterize Ms. Orquidea Arevalo's voiding dysfunction.      VOIDING DIARY:  Did not complete.    DESCRIPTION OF PROCEDURE:  Risks, benefits, and alternatives to urodynamics were discussed with the patient and she wished to proceed.  Urodynamics are planned to better assess the primary etiology for Ms. Arevalo's urologic dysfunction.  The patient last took  mirabegron within the last 8 hours.  After informed consent was obtained, the patient was taken to the procedure room where uroflowmetry was performed. Findings below.     PRE-STUDY UROFLOWMETRY:  Voided volume: 170 mL.  Maximum flow rate: 15.2 mL/sec.  Average flow rate: 5.4 mL/sec.  Character of the curve: continuous.  Postvoid residual by catheter: 30 mL.  Pretest urine dipstick was negative for leukocytes and nitrites.    Next a 7F double-lumen urodynamics catheter was inserted into the bladder under sterile technique via urethra.  A 7F abdominal manometry catheter was placed in the vagina.  EMG pads were placed on both sides of the anal verge.  The bladder was filled with 100 mL of Omnipaque at 40-50 mL/minute and serial pressures were recorded.  With coughing there was an appropriate rise in vesical and abdominal pressures with no change in detrusor pressure, confirming good study catheter placement.    DURING THE FILLING PHASE:  First sensation: 78 mL.  First Desire: 139 mL.  Strong Desire: 367 mL.  Maximum Capacity: filled to 399 mL; true alf 456 mL based on final voided volume + PVR.    Uninhibited detrusor contractions: none.  Compliance: good. PDet low throughout filling.  Continence: stress leak at Pabd 155 cm H2O at a volume of 456 mL.  EMG: concordant during filling.    DURING THE VOIDING PHASE:  Maximum detrusor contraction with void: 16 cm of H2O pressure.  Voided volume: 456 mL.  Maximum flow rate: 26 mL/sec.  Average flow rate: 7.8 mL/sec.  Postvoid Residual: 0 mL.  EMG activity: increased.  Character of voiding curve: fluctuating.  BOOI: -49.3 (suggesting no obstruction - see key below)  [obstructed (HERRERA index [BOOI] ? 40); equivocal (no definite   obstruction; BOOI 20-40); and no obstruction (BOOI ? 20)]    FLUOROSCOPIC IMAGING OF THE BLADDER DURING URODYNAMICS:  Please note, image numbers on UDS tracings correlate with iSite series numbers on PACS images. Fluoroscopy during today's procedure  demonstrated a mildly trabeculated bladder wall without diverticulae or cellules.  No vesicoureteral reflux was observed.  The bladder neck was closed during filling with some hypermobility noted during stress maneuvers; voiding images unavailable as patient transferred to [facility] to void.  After voiding to completion, all catheters were removed.      ASSESSMENT/PLAN:  Ms. Orquidea Arevalo is a pleasant 48 year old female with mixed incontinence who demonstrated the following findings today on urodynamic evaluation:    -Maximum cystometric capacity 456 mL with normal filling sensations.  -Good bladder compliance without detrusor overactivity.  -Stress incontinence at capacity with ALPP 155 cm H2O.  -Maximum detrusor contraction during voiding reaches 16 cm H2O.She voids 456 mL with Qmax 26 ml/s, continuous but slightly fluctuating flow curve, increased EMG activity, complete bladder emptying (PVR 0 mL), and no evidence for bladder outlet obstruction (BOOI -49.3).   -Fluoroscopy reveals a mildly trabeculated bladder wall without diverticuli or VUR. The bladder neck is closed during filling with some hypermobility noted during stress maneuvers; voiding images unavailable since patient transferred to [facility] to void.     The patient will follow up as scheduled with Dr. Tavares for cystoscopy and to further discuss today's study results and make plans for how best to proceed.      - A single Keflex antibiotic was provided for UTI prophylaxis following completion of today's study per department protocol.  The risk of UTI with VUDS is low at ~2.5-3%.      Thank you for allowing me to participate in the care of Ms. Orquidea Arevalo and please don't hesitate to contact me with any questions or concerns.      Monica Zhou PA-C  Urology Physician Assistant

## 2024-04-25 NOTE — PROGRESS NOTES
April 25, 2024    Return visit    Patient returns today for follow up.  Mirabegron controls the urgency but now subjectively with more leakage so also having UDS today.   She denies any changes in her health since last visit.    LMP  (LMP Unknown)   She is comfortable, in no distress, non-labored breathing.  Abdomen is soft, non-tender, non-distended.  Normal external female genitalia.  Negative CST.  Pelvic exam is remarkable for high tone pelvic floor with myofascial tenderness.    Cystoscopy Note: After informed consent was obtained patient was prepped and draped in the standard fashion.  The flexible cystoscope was inserted into a normal appearing urethral meatus.  The urothelium was carefully examined and there were no tumors, masses, stones, foreign bodies, or other urothelial abnormalities noted.  Bilateral ureteral orifices were  noted in the normal orthotopic position and both effluxed clear urine.  The cystoscope was retroflexed and the bladder neck was unremarkable.  The urethra was carefully examined upon removing the cystoscope and was unremarkable.  Patient tolerated the procedure without complications noted.      [facility]DS[place] [place], [facility]I at [facility], Pdet max 16 wity Qmax 26, PVR 0mL    A/P: 48 year old F with mixed incontinence, OAB, history of JIMBO/BSO for endometrial cancer, myofascial tenderness of the pelvic floor and high tone pelvic floor dysfunction with urodynamic stress incontinence    Continue mirabegron    We discussed CRISS and the treatments to include observation, pelvic floor physical therapy, urethral bulking, and surgeries most commonly synthetic midurethral sling or autologous rectus fascial sling.  We discussed the FDA mesh warning on vaginal mesh for prolapse and that this differs from the midurethral sling.  We further gave the patient a copy of the AUGS/SUFU position statement on the use of midurethral slings.  Discussed based on exam I do not think she will tolerate a pessary at this  time    Referral to PT and information provided for her to read more about stress incontinence treatment options    20 minutes were spent today on the date of the encounter in reviewing the EMR, direct patient care, coordination of care and documentation in addition to the cystoscopy procedure    Basia Tavares MD MPH  (she/her/hers)   of Urology  [facility]  Patient Care Team:  Sheba Jane APRN CNP as PCP - General (Family Medicine)  Sheba Chowdary MD as MD (Gynecologic Oncology)  Sheba Chowdary MD as Assigned Cancer Care Provider  Katie Hurd AuD (Audiology)  Effie Hanson NP as Nurse Practitioner  Holly Moreno MD as Referring Physician (Family Medicine)  Basia Tavares MD as MD (Urology)  Basia Tavares MD as Assigned Surgical Provider  Mark Pelaez MD as MD (Cardiovascular Disease)  Mark Pelaez MD as Assigned Heart and Vascular Provider  Sheba Jane APRN CNP as Assigned PCP  Yi Ramirez CNP as Assigned Neuroscience Provider  SELF, REFERRED

## 2024-04-25 NOTE — PATIENT INSTRUCTIONS
"Websites with free information:    [employer] patient website: www.voicesforpfd.org    Total Control Program: www.totalcontrolprogram.com    Please see one of the dedicated pelvic floor physical therapist. If you have not heard from the scheduling office within 2 business days, please call [phone] for [facility]    Please return to see us in 3 months, sooner if needed    It was a pleasure meeting with you today.  Thank you for allowing me and my team the privilege of caring for you today.  YOU are the reason we are here, and I truly hope we provided you with the excellent service you deserve.  Please let us know if there is anything else we can do for you so that we can be sure you are leaving completely satisfied with your care experience.    AFTER YOUR CYSTOSCOPY        You have just completed a cystoscopy, or \"cysto\", which allowed your physician to learn more about your bladder (or to remove a stent placed after surgery). We suggest that you continue to avoid caffeine, fruit juice, and alcohol for the next 24 hours, however, you are encouraged to return to your normal activities.         A few things that are considered normal after your cystoscopy:     * Small amount of bleeding (or spotting) that clears within the next 24 hours     * Slight burning sensation with urination     * Sensation to of needing to avoid more frequently     * The feeling of \"air\" in your urine     * Mild discomfort that is relieved with Tylenol        Please contact our office promptly if you:     * Develop a fever above 101 degrees     * Are unable to urinate     * Develop bright red blood that does not stop     * Severe pain or swelling         Please contact our office with any concerns or questions @[phone]  "

## 2024-04-25 NOTE — NURSING NOTE
No chief complaint on file.      There were no vitals taken for this visit. There is no height or weight on file to calculate BMI.    Patient Active Problem List   Diagnosis    Uterine leiomyoma    Lymphadenopathy    Generalized anxiety disorder    Endometrial stromal sarcoma (H)    Major depressive disorder, single episode, moderate (H)    Functional urinary incontinence    History of hysterectomy for cancer    Elevated serum creatinine    Chronic bilateral low back pain without sciatica    Fatigue, unspecified type    History of breast augmentation    Bradycardia       Allergies   Allergen Reactions    Ciprofloxacin Dizziness       Current Outpatient Medications   Medication Sig Dispense Refill    acetaminophen (TYLENOL) 500 MG tablet Take 500-1,000 mg by mouth every 6 hours as needed for mild pain (Patient not taking: Reported on 4/25/2024)      buPROPion (WELLBUTRIN XL) 300 MG 24 hr tablet       cyclobenzaprine (FLEXERIL) 10 MG tablet Take 1 tablet (10 mg) by mouth nightly as needed for muscle spasms 30 tablet 3    gabapentin (NEURONTIN) 100 MG capsule Take 1 capsule (100 mg) by mouth at bedtime for 7 days, THEN 1 capsule (100 mg) 2 times daily for 7 days, THEN 1 capsule (100 mg) 3 times daily for 7 days, THEN 2 capsules (200 mg) 3 times daily for 7 days, THEN 3 capsules (300 mg) 3 times daily for 62 days. Continue 300 mg three times daily.  If you experience side effects decrease to last tolerated dose and contact the clinic.  If not receiving desired effect contact the clinic. 675 capsule 0    ibuprofen (ADVIL/MOTRIN) 200 MG tablet Take 200 mg by mouth every 4 hours as needed for pain      LORazepam (ATIVAN) 0.5 MG tablet Take 0.5 mg by mouth as needed      meclizine (ANTIVERT) 25 MG tablet Take 1 tablet (25 mg) by mouth 3 times daily as needed for dizziness 20 tablet 1    mirabegron (MYRBETRIQ) 25 MG 24 hr tablet Take 1 tablet (25 mg) by mouth daily 30 tablet 3    ondansetron (ZOFRAN) 4 MG tablet Take 1  tablet (4 mg) by mouth every 8 hours as needed for nausea or vomiting 30 tablet 4    sertraline (ZOLOFT) 50 MG tablet Take 75 mg by mouth daily      topiramate (TOPAMAX) 25 MG tablet TAKE 1 TABLET AT BEDTIME FOR 7 DAYS THEN 1 TWICE DAILY FOR 7 DAYS THEN 2 TABLETS BY MOUTH AT BEDTIME AND 1 IN THE MORNING FOR 7 DAYS THEN 2 TWICE DAILY         Social History     Tobacco Use    Smoking status: Never     Passive exposure: Past    Smokeless tobacco: Never   Substance Use Topics    Alcohol use: Yes     Comment: Occasional 2x month    Drug use: Never       Invasive Procedure Safety Checklist:    Procedure: Cystoscopy    Action: Complete sections and checkboxes as appropriate.    Pre-procedure:  1. Patient ID Verified with 2 identifiers ([place] and  or MRN) : YES    2. Procedure and site verified with patient/designee (when able) : YES    3. Accurate consent documentation in medical record : YES    4. H&P (or appropriate assessment) documented in medical record : N/A  H&P must be up to 30 days prior to procedure an updated within 24 hours of                 Procedure as applicable.     5. Relevant diagnostic and radiology test results appropriately labeled and displayed as applicable : YES    6. Blood products, implants, devices, and/or special equipment available for the procedure as applicable : YES    7. Procedure site(s) marked with provider initials [Exclusions: none] : NO    8. Marking not required. Reason : Yes  Procedure does not require site marking    Time Out:     Time-Out performed immediately prior to starting procedure, including verbal and active participation of all team members addressing: YES    1. Correct patient identity.  2. Confirmed that the correct side and site are marked.  3. An accurate procedure to be done.  4. Agreement on the procedure to be done.  5. Correct patient position.  6. Relevant images and results are properly labeled and appropriately displayed.  7. The need to administer  antibiotics or fluids for irrigation purposes during the procedure as applicable.  8. Safety precautions based on patient history or medication use.    During Procedure: Verification of correct person, site, and procedure occurs any time the responsibility for care of the patient is transferred to another member of the care team.    The following medication was given:     MEDICATION:  Lidocaine without epinephrine 2% jelly  ROUTE: urethral   SITE: urethral   DOSE: 10 mL  LOT #: [id number]  : [employer]  EXPIRATION DATE: 12-25  NDC#: [address]-3   Was there drug waste? No    Prior to med admin, verified patient identity using patient's name and date of birth.  Due to med administration, patient instructed to remain in clinic for 15 minutes  afterwards, and to report any adverse reaction to me immediately.    Drug Amount Wasted:  None.  Vial/Syringe: Syringe      Ara Mazariegos  4/25/2024  1:07 PM

## 2024-04-25 NOTE — LETTER
4/25/2024       RE: Orquidea Arevalo  [address] [facility] [address]     Dear Colleague,    Thank you for referring your patient, Orquidea Arevalo, to the [facility] [place] at [facility]. Please see a copy of my visit note below.    April 25, 2024    Return visit    Patient returns today for follow up.  Mirabegron controls the urgency but now subjectively with more leakage so also having UDS today.   She denies any changes in her health since last visit.    LMP  (LMP Unknown)   She is comfortable, in no distress, non-labored breathing.  Abdomen is soft, non-tender, non-distended.  Normal external female genitalia.  Negative CST.  Pelvic exam is remarkable for high tone pelvic floor with myofascial tenderness.    Cystoscopy Note: After informed consent was obtained patient was prepped and draped in the standard fashion.  The flexible cystoscope was inserted into a normal appearing urethral meatus.  The urothelium was carefully examined and there were no tumors, masses, stones, foreign bodies, or other urothelial abnormalities noted.  Bilateral ureteral orifices were  noted in the normal orthotopic position and both effluxed clear urine.  The cystoscope was retroflexed and the bladder neck was unremarkable.  The urethra was carefully examined upon removing the cystoscope and was unremarkable.  Patient tolerated the procedure without complications noted.      VUDS-[facility] 456 mL, USI at [facility], Pdet max 16 wity Qmax 26, PVR 0mL    A/P: 48 year old F with mixed incontinence, OAB, history of JIMBO/BSO for endometrial cancer, myofascial tenderness of the pelvic floor and high tone pelvic floor dysfunction with urodynamic stress incontinence    Continue mirabegron    We discussed CRISS and the treatments to include observation, pelvic floor physical therapy, urethral bulking, and surgeries most commonly synthetic midurethral sling or autologous rectus  fascial sling.  We discussed the FDA mesh warning on vaginal mesh for prolapse and that this differs from the midurethral sling.  We further gave the patient a copy of the AUGS/SUFU position statement on the use of midurethral slings.  Discussed based on exam I do not think she will tolerate a pessary at this time    Referral to PT and information provided for her to read more about stress incontinence treatment options    20 minutes were spent today on the date of the encounter in reviewing the EMR, direct patient care, coordination of care and documentation in addition to the cystoscopy procedure    Basia Tavares MD MPH  (she/her/hers)   of Urology  [facility]      CC  Patient Care Team:  Sheba Jane APRN CNP as PCP - General (Family Medicine)  Sheba Chowdary MD as MD (Gynecologic Oncology)  Sheba Chowdary MD as Assigned Cancer Care Provider  Katie Hurd AuD (Audiology)  Effie Hanson NP as Nurse Practitioner  Holly Moreno MD as Referring Physician (Family Medicine)  Basia Tavares MD as MD (Urology)  Basia Tavares MD as Assigned Surgical Provider  Mark Pelaez MD as MD (Cardiovascular Disease)  Mark Pelaez MD as Assigned Heart and Vascular Provider  Sheba Jane APRN CNP as Assigned PCP  Yi Ramirez CNP as Assigned Neuroscience Provider  SELF, REFERRED

## 2024-04-29 ENCOUNTER — PATIENT OUTREACH (OUTPATIENT)
Dept: CARE COORDINATION | Facility: CLINIC | Age: 49
End: 2024-04-29
Payer: COMMERCIAL

## 2024-05-07 ENCOUNTER — PATIENT OUTREACH (OUTPATIENT)
Dept: ONCOLOGY | Facility: CLINIC | Age: 49
End: 2024-05-07
Payer: COMMERCIAL

## 2024-05-07 DIAGNOSIS — C54.1 ENDOMETRIAL CANCER (H): Primary | ICD-10-CM

## 2024-05-08 ENCOUNTER — OFFICE VISIT (OUTPATIENT)
Dept: FAMILY MEDICINE | Facility: CLINIC | Age: 49
End: 2024-05-08
Payer: COMMERCIAL

## 2024-05-08 VITALS
DIASTOLIC BLOOD PRESSURE: 74 MMHG | TEMPERATURE: 97.2 F | OXYGEN SATURATION: 97 % | SYSTOLIC BLOOD PRESSURE: 112 MMHG | RESPIRATION RATE: 14 BRPM | HEIGHT: 66 IN | BODY MASS INDEX: 29.62 KG/M2 | HEART RATE: 56 BPM | WEIGHT: 184.3 LBS

## 2024-05-08 DIAGNOSIS — C54.1 ENDOMETRIAL STROMAL SARCOMA (H): ICD-10-CM

## 2024-05-08 DIAGNOSIS — F44.4 FUNCTIONAL NEUROLOGICAL SYMPTOM DISORDER WITH ABNORMAL MOVEMENT: Primary | ICD-10-CM

## 2024-05-08 DIAGNOSIS — M54.50 CHRONIC BILATERAL LOW BACK PAIN WITHOUT SCIATICA: ICD-10-CM

## 2024-05-08 DIAGNOSIS — Z90.710 HISTORY OF HYSTERECTOMY FOR CANCER: ICD-10-CM

## 2024-05-08 DIAGNOSIS — F41.1 GENERALIZED ANXIETY DISORDER: ICD-10-CM

## 2024-05-08 DIAGNOSIS — F32.1 MAJOR DEPRESSIVE DISORDER, SINGLE EPISODE, MODERATE (H): ICD-10-CM

## 2024-05-08 DIAGNOSIS — Z02.71 DISABILITY EXAMINATION: ICD-10-CM

## 2024-05-08 DIAGNOSIS — G89.29 CHRONIC BILATERAL LOW BACK PAIN WITHOUT SCIATICA: ICD-10-CM

## 2024-05-08 PROCEDURE — 99215 OFFICE O/P EST HI 40 MIN: CPT | Performed by: NURSE PRACTITIONER

## 2024-05-08 RX ORDER — LORAZEPAM 0.5 MG/1
0.5 TABLET ORAL PRN
Status: CANCELLED | OUTPATIENT
Start: 2024-05-08

## 2024-05-08 ASSESSMENT — ANXIETY QUESTIONNAIRES
1. FEELING NERVOUS, ANXIOUS, OR ON EDGE: NEARLY EVERY DAY
2. NOT BEING ABLE TO STOP OR CONTROL WORRYING: NEARLY EVERY DAY
GAD7 TOTAL SCORE: 19
3. WORRYING TOO MUCH ABOUT DIFFERENT THINGS: NEARLY EVERY DAY
6. BECOMING EASILY ANNOYED OR IRRITABLE: NEARLY EVERY DAY
5. BEING SO RESTLESS THAT IT IS HARD TO SIT STILL: SEVERAL DAYS
IF YOU CHECKED OFF ANY PROBLEMS ON THIS QUESTIONNAIRE, HOW DIFFICULT HAVE THESE PROBLEMS MADE IT FOR YOU TO DO YOUR WORK, TAKE CARE OF THINGS AT HOME, OR GET ALONG WITH OTHER PEOPLE: EXTREMELY DIFFICULT
7. FEELING AFRAID AS IF SOMETHING AWFUL MIGHT HAPPEN: NEARLY EVERY DAY
GAD7 TOTAL SCORE: 19

## 2024-05-08 ASSESSMENT — PATIENT HEALTH QUESTIONNAIRE - PHQ9
SUM OF ALL RESPONSES TO PHQ QUESTIONS 1-9: 16
SUM OF ALL RESPONSES TO PHQ QUESTIONS 1-9: 16
10. IF YOU CHECKED OFF ANY PROBLEMS, HOW DIFFICULT HAVE THESE PROBLEMS MADE IT FOR YOU TO DO YOUR WORK, TAKE CARE OF THINGS AT HOME, OR GET ALONG WITH OTHER PEOPLE: EXTREMELY DIFFICULT
5. POOR APPETITE OR OVEREATING: NEARLY EVERY DAY

## 2024-05-08 NOTE — PROGRESS NOTES
Assessment & Plan     Disability examination  Patient presents today needing forms completed for the [employer] long-term disability as well as a disability at [employer] physical impairment form for possible sliding scale insulin.    She has a 3 reminder some of which I was able to make copies of outlining her symptoms and past medical history    Patient wanting forms completed on basis of mental health concerns, chronic pain, as well as diagnosis of conversion disorder/functional neurological symptom disorder.    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  Patient diagnosed 11/8/2023 by ED doctor after patient having upper extremity full arm spasms bilaterally and facial spasms with known found pathologic cause    -No symptoms today, worsening with stress, currently seeing therapy  -Consider specialist for specialized neuro PT,   -Continue with current PT regimen  -Patient has acupuncture scheduled May 28 and June 5 of this year   alternative medication, continue DBT therapy    Major depressive disorder, single episode, moderate (H)  Continue follow-up with psychiatrist   continue follow-up with therapist  Continue group or DBT  Continue prescribed medication    Generalized anxiety disorder  Directed patient to reach out to psychiatrist for benzodiazepine refills  Doing well with current medication     Chronic bilateral low back pain without sciatica  continue follow-up with spinal specialist  Urologist currently prescribed gabapentin-patient to check with urologist on how to take this if she is taking it differently than it is prescribed    Endometrial stromal sarcoma (H)  History of hysterectomy for cancer  Status post hysterectomy  Holding letrozole for now due to increased joint pain in hands  Continue follow-up with oncology every 6 months and imaging annually this will be with me to be faxed we  "can do it tomorrow          Spent 50mins doing chart review, history and exam, patient education, completing forms -see above, documentation and further activities per the note.    BMI  Estimated body mass index is 29.75 kg/m  as calculated from the following:    Height as of this encounter: 1.676 m (5' 6\").    Weight as of this encounter: 83.6 kg (184 lb 4.8 oz).             Subjective   Raffi is a 48 year old, presenting for the following health issues:  Follow Up (Forms for disability )        5/8/2024    10:48 AM   Additional Questions   Roomed by LUZ-LPN   Accompanied by NONE     History of Present Illness       Reason for visit:  Disability forms    She eats 0-1 servings of fruits and vegetables daily.She consumes 0 sweetened beverage(s) daily.She exercises with enough effort to increase her heart rate 30 to 60 minutes per day.  She exercises with enough effort to increase her heart rate 4 days per week.   She is taking medications regularly.     Conversion disorder - reported first 11/8/23 - 47 y.o. with past medical history relevant for depression, PTSD, panic disorder, anxiety, who presents to the emergency permit noting that over the last 2 months she has noted twitching in her eye, notes that more recently it is started in her face and she has noted shaking/tremors in all of the body parts now starting this morning     Chronic pain - ankles, hips, hands, back (lumbar) is the worst and prolonged pain,  Neck and posterior right shoulder   Does have periods mental health stress - triggers conversioan disorder - lasts 1-3 days.     In partial hspitalization program, intesnisve out tp , in DBT progra   Therapst 2/week.     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 Worked full " "time. Was laid off Feb 2023, prior to this in a toxic work environment and this made it difficult to continue to work. \"Spend years pretending she wasn't sick\"  Got another job in the company, and all of them high pressure and stress - couldn't write an email. Was crying a lot and felt things caught up with her.  Had a breakdown.   Has not tried less stressful works since them, has been in treatment and therapy since then.  At home able to be less stressed and is able to relax more.     Psych: sertraline 75mg, wellbutrin 300mg - main concern now is fatigue (physically), not napping, trouble falling asleep and wakes up a few times. Hard time fall back asleep sometimes.      Lorazepam - given by old PCP - for panic attacks and high anxiety situation - taken 2 time/jackeline also with flying) always has it with her.     Migraines - topamax - not taking this    [place] (previous Dr started her on Hot flashes)     -Urologist - rx'ing - gabapentin 200mg.     [facility] - oncology scan every year, 6 months follow ups.  Stopped letrozole - on for 2 years but made joint pain worse - stopped March 2023. S/p 2 surgery clips in iliac vein and pain on right side - pelvic pain . Diagnosis make anxiety worse.  Constaly worried.     Pain treatment: naproxen, muscle relaxants,   GAD7 - 19      Long term disability form - on this for mental health conditions -   Include physical conditions     Dx by ED doctor at [facility]  - with conversion disorder.   Full arm and leg spasm and movements - face movements.   DBT since Dec 2023                Review of Systems  Constitutional, HEENT, cardiovascular, pulmonary, gi and gu systems are negative, except as otherwise noted.      Objective    /74   Pulse 56   Temp 97.2  F (36.2  C) (Oral)   Resp 14   Ht 1.676 m (5' 6\")   Wt 83.6 kg (184 lb 4.8 oz)   LMP  (LMP Unknown)   SpO2 97%   BMI 29.75 kg/m    Body mass index is 29.75 kg/m .  Physical Exam   GENERAL: alert and no " distress  EYES: Eyes grossly normal to inspection, PERRL and conjunctivae and sclerae normal  HENT: ear canals and TM's normal, nose and mouth without ulcers or lesions  NECK: no adenopathy, no asymmetry, masses, or scars  RESP: lungs clear to auscultation - no rales, rhonchi or wheezes  CV: regular rate and rhythm, normal S1 S2, no S3 or S4, no murmur, click or rub, no peripheral edema  ABDOMEN: soft, nontender, no hepatosplenomegaly, no masses and bowel sounds normal  MS: no gross musculoskeletal defects noted, no edema  SKIN: no suspicious lesions or rashes  NEURO: Normal strength and tone 5/5 in all extremities, mentation intact and speech normal, CN 1-12 intact today.   PSYCH: mentation appears normal, affect normal/bright            Signed Electronically by: ADITYA Crain CNP

## 2024-05-14 ENCOUNTER — THERAPY VISIT (OUTPATIENT)
Dept: PHYSICAL THERAPY | Facility: CLINIC | Age: 49
End: 2024-05-14
Attending: NURSE PRACTITIONER
Payer: COMMERCIAL

## 2024-05-14 DIAGNOSIS — R20.2 NUMBNESS AND TINGLING OF RIGHT LEG: ICD-10-CM

## 2024-05-14 DIAGNOSIS — M54.50 CHRONIC MIDLINE LOW BACK PAIN WITHOUT SCIATICA: ICD-10-CM

## 2024-05-14 DIAGNOSIS — G89.29 CHRONIC NECK PAIN: ICD-10-CM

## 2024-05-14 DIAGNOSIS — M89.8X1 PAIN OF RIGHT SCAPULA: ICD-10-CM

## 2024-05-14 DIAGNOSIS — R20.0 NUMBNESS AND TINGLING OF RIGHT LEG: ICD-10-CM

## 2024-05-14 DIAGNOSIS — M51.369 BULGING LUMBAR DISC: ICD-10-CM

## 2024-05-14 DIAGNOSIS — G89.29 CHRONIC MIDLINE LOW BACK PAIN WITHOUT SCIATICA: ICD-10-CM

## 2024-05-14 DIAGNOSIS — M54.2 CHRONIC NECK PAIN: ICD-10-CM

## 2024-05-14 PROCEDURE — 97110 THERAPEUTIC EXERCISES: CPT | Mod: GP | Performed by: PHYSICAL THERAPIST

## 2024-05-14 PROCEDURE — 97161 PT EVAL LOW COMPLEX 20 MIN: CPT | Mod: GP | Performed by: PHYSICAL THERAPIST

## 2024-05-14 NOTE — PROGRESS NOTES
PHYSICAL THERAPY EVALUATION  Type of Visit: Evaluation    See electronic medical record for Abuse and Falls Screening details.    Subjective       Presenting condition or subjective complaint: Neck and back pain since 2019 with an insidious onset.  Cervical pain is central and will travel up to head and behind right shoulder blade.  Denies UE involvmeent.  Pain is constant with sleeping positions, looking down, prlonged positions will increase symptoms.  Has tried heat but didn't help.  Medications will decrease symptoms. She has headaches that occur about 3x/week which can increase with stress, lack of sleep.  Low back is central and will radiate into the right pelvic region. Pain is constant with prolonged standing, sitting, bending, will increase symptoms.  Medication, yoga, heat and ice will decrease symptoms.  For several years she has had numbness in her R ankle which will increase when she sits, such as driving or recliner at night. In the past she did get increase symptoms with Cross Fit and Dead lifts so has stopped these activities.  Date of onset: 04/16/24 (date of referral)    Relevant medical history: Arthritis; Bladder or bowel problems; Cancer; Depression; Dizziness; Incontinence; Mental Illness; Migraines or headaches   Dates & types of surgery:      Prior diagnostic imaging/testing results: X-ray     Prior therapy history for the same diagnosis, illness or injury: No has seen a chiropractor which was helpful when she went.    Prior Level of Function  Transfers:   Ambulation:   ADL:   IADL:     Living Environment  Social support: With a significant other or spouse   Type of home: House   Stairs to enter the home:         Ramp:     Stairs inside the home:         Help at home:    Equipment owned:       Employment: No    Hobbies/Interests: yoga, guitar, family, art    Patient goals for therapy: sit and stand for prolonged periods    Pain assessment: Pain present     Objective   LUMBAR and cervical  SPINE EVALUATION  PAIN: Pain Level at Rest: 2/10  Pain Level with Use: 7/10  INTEGUMENTARY (edema, incisions):   POSTURE:  rounded shoulders  GAIT:   Weightbearing Status:   Assistive Device(s):   Gait Deviations:   BALANCE/PROPRIOCEPTION:   WEIGHTBEARING ALIGNMENT:   NON-WEIGHTBEARING ALIGNMENT:    ROM:  Lumbar: trunk flexion: fingertips to patellas limited by pain, extension: WFL with pain, sidebending WFL and no pain.  Cervicali flexion: WFL, tightness B, extension: 32 degrees with tightness,Rotation B: WFL, no significant tightness   PELVIC/SI SCREEN:   STRENGTH:  B LE strength 5/5 throughout, B UE strength: 5/5 throughout    MYOTOMES:   DTR S:   CORD SIGNS:   DERMATOMES:   NEURAL TENSION:   FLEXIBILITY:  cervical: decreased with pectoralis, scalenes;  normal hamstrings, decrease R hip flexor  LUMBAR/HIP Special Tests:  Negative B SLR and slumpt test, leg length =, ASIS equal, cervical compression/distraction negative    PELVIS/SI SPECIAL TESTS:   FUNCTIONAL TESTS:   PALPATION:   + Tenderness At Location Left Right   Quadratus Lumborum + +   Erector Spinae + +   Piriformis  - -   PSIS + +   ASIS + +   Iliac Crest - -   Glut Medius - -   Greater Trochanter - -   Ischial Tuberosity     Hamstrings     Psoas and iliacus + +   Hip Flexors + +   Vertebral  + +     Cervical palpation: pain over R sub occipitals, B scalene, R rhomboid, upper trapezius, levator scapulae,   SPINAL SEGMENTAL CONCLUSIONS:     All weight progressions in therapy sessions are dictated by the patient tolerance and therapist discretion. Testing on MedX equipment is completed on 4-week intervals to allow for physiological change in muscle structure and neuromuscular re-education.    Lumbar MedX Initial testing    AROM (full=  0-72  lumbar)    Max Extension Torque     Flex: ext ratio (ideal 1.4:1)      Cervical MedX Initial testing  5/14/24   AROM (full= 0-120  cervical) 27-   Max Extension Torque  129   Flex: ext ratio (ideal 1.4:1) 1.19:1      Date 5/14/24   Lumbar Parameters    Top Dead Center (TDC)    Counterbalance (CB)    Seat Pad    Femur Restraint    Week/Visit    Enter Week/Visit # Wk 1 V 1   Weight (lbs)    Reps (#)    Time    ROM (degrees)    Pain    Therapist cues    Cervical Parameters    Top Dead Center (TDC) 57   Counterbalance (CB) 1.0   Seat Height 453   Week/Visit    Enter Week/Visit # Wk 1 V 1   Weight (lbs) --   Reps (#) --   Time    ROM (degrees) 27-78   Pain    Therapist cues      Date 5/14/24   Exercise        Chin tuck and scapular retraction Hold 10 seconds x 10 reps   Tennis or lacrosse ball for TP release in rhomboid today                                         Modifications instructed by therapist:     Assessment & Plan   CLINICAL IMPRESSIONS  Medical Diagnosis: Cervical and Lumbar Pain    Treatment Diagnosis: Cervical and Luymbar Pain   Impression/Assessment: Patient is a 48 year old female with cervical and lumbar pain complaints.  The following significant findings have been identified: Pain, Decreased ROM/flexibility, Decreased joint mobility, Impaired muscle performance, and Decreased activity tolerance. These impairments interfere with their ability to perform self care tasks and looking down, prolonged positions of sitting and standing, headaches  as compared to previous level of function.     Clinical Decision Making (Complexity):  Clinical Presentation: Stable/Uncomplicated  Clinical Presentation Rationale: based on medical and personal factors listed in PT evaluation  Clinical Decision Making (Complexity): Low complexity    PLAN OF CARE  Treatment Interventions:  Interventions: Manual Therapy, Neuromuscular Re-education, Therapeutic Activity, Therapeutic Exercise, Self-Care/Home Management    Long Term Goals     PT Goal 1  Goal Identifier: headaches  Goal Description: Pt will have decrease in frequency of headaches to 1x/week  Target Date: 08/12/24  PT Goal 2  Goal Identifier: gardening  Goal Description: Pt will  be able to work in her garden 30+ minutes at a time with decreease in pain levels to 0-1/10  Target Date: 08/12/24  PT Goal 3  Goal Identifier: stand  Goal Description: Pt will be able to increase standing time to 30 minutes for tasks such as cooking  Target Date: 08/12/24      Frequency of Treatment: 2x/week, decreasing to 1x/week  Duration of Treatment: 16 visits    Recommended Referrals to Other Professionals:   Education Assessment:   Learner/Method: Patient    Risks and benefits of evaluation/treatment have been explained.   Patient/Family/caregiver agrees with Plan of Care.     Evaluation Time:     PT Eval, Low Complexity Minutes (77862): 25       Signing Clinician: Ashlyn Navarro PT

## 2024-05-21 ENCOUNTER — OFFICE VISIT (OUTPATIENT)
Dept: SLEEP MEDICINE | Facility: CLINIC | Age: 49
End: 2024-05-21
Attending: INTERNAL MEDICINE
Payer: COMMERCIAL

## 2024-05-21 ENCOUNTER — THERAPY VISIT (OUTPATIENT)
Dept: PHYSICAL THERAPY | Facility: CLINIC | Age: 49
End: 2024-05-21
Attending: NURSE PRACTITIONER
Payer: COMMERCIAL

## 2024-05-21 DIAGNOSIS — G89.29 CHRONIC MIDLINE LOW BACK PAIN WITHOUT SCIATICA: Primary | ICD-10-CM

## 2024-05-21 DIAGNOSIS — G47.33 OSA (OBSTRUCTIVE SLEEP APNEA): Primary | ICD-10-CM

## 2024-05-21 DIAGNOSIS — R20.0 NUMBNESS AND TINGLING OF RIGHT LEG: ICD-10-CM

## 2024-05-21 DIAGNOSIS — M54.50 CHRONIC MIDLINE LOW BACK PAIN WITHOUT SCIATICA: Primary | ICD-10-CM

## 2024-05-21 DIAGNOSIS — G89.29 CHRONIC NECK PAIN: ICD-10-CM

## 2024-05-21 DIAGNOSIS — G47.19 EXCESSIVE DAYTIME SLEEPINESS: ICD-10-CM

## 2024-05-21 DIAGNOSIS — R20.2 NUMBNESS AND TINGLING OF RIGHT LEG: ICD-10-CM

## 2024-05-21 DIAGNOSIS — M51.369 BULGING LUMBAR DISC: ICD-10-CM

## 2024-05-21 DIAGNOSIS — M89.8X1 PAIN OF RIGHT SCAPULA: ICD-10-CM

## 2024-05-21 DIAGNOSIS — R06.83 SNORING: ICD-10-CM

## 2024-05-21 DIAGNOSIS — M54.2 CHRONIC NECK PAIN: ICD-10-CM

## 2024-05-21 PROCEDURE — 95800 SLP STDY UNATTENDED: CPT

## 2024-05-21 PROCEDURE — 97110 THERAPEUTIC EXERCISES: CPT | Mod: GP | Performed by: PHYSICAL THERAPIST

## 2024-05-21 ASSESSMENT — SLEEP AND FATIGUE QUESTIONNAIRES
HOW LIKELY ARE YOU TO NOD OFF OR FALL ASLEEP WHEN YOU ARE A PASSENGER IN A CAR FOR AN HOUR WITHOUT A BREAK: MODERATE CHANCE OF DOZING
HOW LIKELY ARE YOU TO NOD OFF OR FALL ASLEEP WHILE WATCHING TV: SLIGHT CHANCE OF DOZING
HOW LIKELY ARE YOU TO NOD OFF OR FALL ASLEEP WHILE LYING DOWN TO REST IN THE AFTERNOON WHEN CIRCUMSTANCES PERMIT: MODERATE CHANCE OF DOZING
HOW LIKELY ARE YOU TO NOD OFF OR FALL ASLEEP IN A CAR, WHILE STOPPED FOR A FEW MINUTES IN TRAFFIC: WOULD NEVER DOZE
HOW LIKELY ARE YOU TO NOD OFF OR FALL ASLEEP WHILE SITTING INACTIVE IN A PUBLIC PLACE: WOULD NEVER DOZE
HOW LIKELY ARE YOU TO NOD OFF OR FALL ASLEEP WHILE SITTING AND READING: SLIGHT CHANCE OF DOZING
HOW LIKELY ARE YOU TO NOD OFF OR FALL ASLEEP WHILE SITTING AND TALKING TO SOMEONE: WOULD NEVER DOZE
HOW LIKELY ARE YOU TO NOD OFF OR FALL ASLEEP WHILE SITTING QUIETLY AFTER LUNCH WITHOUT ALCOHOL: SLIGHT CHANCE OF DOZING

## 2024-05-21 NOTE — PROGRESS NOTES
"    All weight progressions in therapy sessions are dictated by the patient tolerance and therapist discretion. Testing on MedX equipment is completed on 4-week intervals to allow for physiological change in muscle structure and neuromuscular re-education.    Lumbar MedX Initial testing   5/21/24   AROM (full=  0-72  lumbar) 0-36   Max Extension Torque  108   Flex: ext ratio (ideal 1.4:1) 2.35:1     Cervical MedX Initial testing  5/14/24   AROM (full= 0-120  cervical) 27-78   Max Extension Torque  129   Flex: ext ratio (ideal 1.4:1) 1.19:1     Date 5/21/24 5/14/24   Lumbar Parameters     Top Dead Center (TDC) 18    Counterbalance (CB) 200    Seat Pad 1    Femur Restraint 6    Week/Visit     Enter Week/Visit # Wk 1 V 2 Wk 1 V 1   Weight (lbs) 54#    Reps (#) 15    Time 128s    ROM (degrees) 0-36    Pain No pain    Therapist cues     Cervical Parameters     Top Dead Center (TDC) 57 57   Counterbalance (CB) 1.0 1.0   Seat Height 453 453   Week/Visit     Enter Week/Visit # Wk 1 V 2 Wk 1 V 1   Weight (lbs) 99# --   Reps (#) 13 --   Time 99    ROM (degrees) 27-84 27-78   Pain \"Tense\"    Therapist cues       Date 5/21/24 5/14/24   Exercise     treadmill 4 min warm up    Rotary Torso,   90 seconds B 20# to R    Chin tuck and scapular retraction  Hold 10 seconds x 10 reps   Tennis or lacrosse ball for TP release in rhomboid  today                                                  Modifications instructed by therapist:   "

## 2024-05-21 NOTE — PROGRESS NOTES
Pt is completing a home sleep test. Pt was instructed on how to put on the Noxturnal T3 device and associated equipment before going to bed and given the opportunity to practice putting it on before leaving the sleep center. Pt was reminded to bring the home sleep test kit back to the center tomorrow, at agreed upon time for download and reporting.   Neck circumference: 35.5 CM / 14 inches.

## 2024-05-22 ENCOUNTER — DOCUMENTATION ONLY (OUTPATIENT)
Dept: SLEEP MEDICINE | Facility: CLINIC | Age: 49
End: 2024-05-22
Payer: COMMERCIAL

## 2024-05-22 NOTE — PROGRESS NOTES
HST POST-STUDY QUESTIONNAIRE    What time did you go to bed?  9:30  How long do you think it took to fall asleep?  1HR  What time did you wake up to start the day?  6:15  Did you get up during the night at all?  yes  If you woke up, do you remember approximately what time(s)? 3am, bathroom  Did you have any difficulty with the equipment?  No  Did you us any type of treatment with this study?  None  Was the head of the bed elevated? Yes  Did you sleep in a recliner?  No  Did you stop using CPAP at least 3 days before this test?  NA  Any other information you'd like us to know? N/a

## 2024-05-23 ENCOUNTER — VIRTUAL VISIT (OUTPATIENT)
Dept: ONCOLOGY | Facility: CLINIC | Age: 49
End: 2024-05-23
Attending: STUDENT IN AN ORGANIZED HEALTH CARE EDUCATION/TRAINING PROGRAM
Payer: COMMERCIAL

## 2024-05-23 VITALS — HEIGHT: 65 IN | BODY MASS INDEX: 28.32 KG/M2 | WEIGHT: 170 LBS

## 2024-05-23 DIAGNOSIS — C54.1 ENDOMETRIAL STROMAL SARCOMA (H): ICD-10-CM

## 2024-05-23 DIAGNOSIS — R63.8 ALTERATION IN APPETITE: ICD-10-CM

## 2024-05-23 DIAGNOSIS — C54.1 ENDOMETRIAL CANCER (H): Primary | ICD-10-CM

## 2024-05-23 DIAGNOSIS — M79.2 NEUROPATHIC PAIN: ICD-10-CM

## 2024-05-23 DIAGNOSIS — R53.83 OTHER FATIGUE: ICD-10-CM

## 2024-05-23 DIAGNOSIS — N95.1 VASOMOTOR SYMPTOMS DUE TO MENOPAUSE: ICD-10-CM

## 2024-05-23 PROCEDURE — 99205 OFFICE O/P NEW HI 60 MIN: CPT | Mod: 95 | Performed by: STUDENT IN AN ORGANIZED HEALTH CARE EDUCATION/TRAINING PROGRAM

## 2024-05-23 PROCEDURE — 99417 PROLNG OP E/M EACH 15 MIN: CPT | Mod: 95 | Performed by: STUDENT IN AN ORGANIZED HEALTH CARE EDUCATION/TRAINING PROGRAM

## 2024-05-23 RX ORDER — GABAPENTIN 100 MG/1
CAPSULE ORAL
Qty: 150 CAPSULE | Refills: 3 | Status: SHIPPED | OUTPATIENT
Start: 2024-05-23 | End: 2024-09-23

## 2024-05-23 ASSESSMENT — PAIN SCALES - GENERAL: PAINLEVEL: MODERATE PAIN (4)

## 2024-05-23 NOTE — NURSING NOTE
Is the patient currently in the state of [place]? YES    Visit mode:VIDEO    If the visit is dropped, the patient can be reconnected by: VIDEO VISIT: Text to cell phone:   Telephone Information:   Mobile [phone]       Will anyone else be joining the visit? NO  (If patient encounters technical issues they should call [phone]    How would you like to obtain your AVS? MyChart    Are changes needed to the allergy or medication list? Pt stated no changes to allergies and Pt stated no med changes    Are refills needed on medications prescribed by this physician? NO    Reason for visit: Consult    Tiffanie TAMEZ

## 2024-05-23 NOTE — LETTER
2024         RE: Orquidea Arevalo  [address] [facility] [id number]        Dear Colleague,    Thank you for referring your patient, Orquidea Arevalo, to the [facility]. Please see a copy of my visit note below.    Virtual Visit Details    Type of service:  Video Visit     Distant Location (provider location):  On-site  Platform used for Video Visit: [facility]           [facility] Note     Patient Name: Orquidea Arevalo : 1975 Medical Record: [id number]     Requesting Physician/clinician: Sheba Chowdary MD           History of Present Illness:     Orquidea Arevalo is a 48 year old female with history of endometrial cancer who presents to [facility] for evaluation of her rehabilitation needs in the setting of right groin pain and fatigue.    Oncology History:  2019 Surgery and Procedures with OB/GYN  total laparoscopic hysterectomy, bilateral salpingectomy with only right oophorectomy. A presumed fibroid mass wrapped around a ligament. Left ovary appeared normal. 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 [facility] did not see it involving the fallopian tube but such was recorded/reported.  ([facility]: 14.5 cm LGESS extending into 1 FT; LVSI +)  2019, CT chest abdomen pelvis shows right and left pelvic masses possibly representing metastatic disease. Chest imaging was not definitive for metastases with some enlarged but not significantly enlarged nodes:  Postoperative changes of hysterectomy, bilateral salpingectomy, and right  oophorectomy.   Within the right adnexal region there is a 3.9 x 3.3 cm hyperenhancing soft  tissue mass concerning for metastatic disease or local recurrence. Expansive  filling defect within the right internal iliac vein that extends cranially to  the level of the mid right common iliac vein (series 4, image 166; series 605,  image 65). The density is similar to  the right adnexal mass, which is concerning for tumor thrombus. No evidence of DVT in the right external iliac or common femoral veins.  Within the left adnexa there is a 7.1 x 3.7 cm loculated cystic lesion.   Multiple tiny hypodense hepatic lesions most likely represent benign  hemangiomas/cysts, but are indeterminate for metastases. The gallbladder,  pancreas, adrenal glands, and kidneys are normal. Small splenules. No  lymphadenopathy in the abdomen or pelvis. Normal caliber small and large bowel. No aggressive osseous lesions.  1/17/2020 Tumor Board   Path review: LGESS arising from the myometrium perhaps extending to uterine cornua but no evidence of the slides submitted that there is any disease on a fallopian tube or ovary  Rad review: Some LGESS may have FDG update on PET; mediastinal node is suspicious   Biopsy of the mediastinal node and if positive, consider resection vs. adjuvant treatment (hormone therapy) after surgery  Return to the OR for resection of residual disease and LSO  2/10/2020 Surgery and Procedures:02/10/2020 EXPLORATORY LAPAROTOMY., LEFT OOPHORECTOMY, RESECTION PELVIC MASS., UPPER VAGINECTOMY, CYSTOSCOPY, INSERTION STENT URETER., Resection of right internal iliac vein with intravenous tumor. Repair of right common iliac vein., Lymphadenectomy   Surgery  Exlap, resection of pelvic disease remaining from original surgery elsewhere. Resection of tumor mass within the right pelvic iliac vein system.   Complete gross resection.   Stage IIB  3/7/2020 - Biological/Targeted/Hormone Therapy   Letrozole 2.5 daily. At first month, nausea is minimal. Hot flashes are ok. Working from home and staying active. 2-3 mi walking 5 days. CrossFit 40 min three times weekly. Baseline bone mineral density is good, no osteopenia.  3/2023: Letrozole stopped due to side effects   3/20/23: CT CAP  COMPARISON:  Multiple priors, most recently CT 06/11/2022   FINDINGS:     Prior hysterectomy, bilateral  salpingo-oophorectomy, right iliac vein resection, and partial vaginectomy for endometrial stromal sarcoma. Postsurgical changes and surgical clips in the pelvis.   There is tethering of the right distal ureter in the pelvis with mild associated urothelial thickening and enhancement, for example series 1, image 111. Mild upstream hydroureter without hydronephrosis. The contralateral ureter is normal. The bladder is within expected limits. No recurrent soft tissue in the pelvis or along the iliac veins. No new adenopathy or free fluid.   Multiple hypoattenuating lesions in the liver are stable dating back to 01/22/2020 and likely represent benign cysts. No suspicious hepatic masses. Non-cirrhotic morphology of the liver with   patent portal and hepatic veins.   The gallbladder and biliary tree are normal. No suspicious pancreatic masses. The adrenal glands and   kidneys are normal. Normal spleen with 2 small splenules. The bowel is within expected limits. Tiny fat-containing umbilical hernia. No aggressive osseous lesions. A CT chest was performed at the same time which will be reported separately.   IMPRESSION:   1.  No findings of recurrence in the abdomen or pelvis.   2.  A CT chest was performed at the same time which will be reported separately.  CT chest:  FINDINGS:   This examination was performed in conjunction with a CT of the abdomen, which will be reported separately.   No suspicious lung nodule. No bulky thoracic lymphadenopathy. No suspicious osseous lesion. No pleural effusion. Interval left breast implant rupture. Right breast implant appears similar since prior exam.   IMPRESSION:   1.  No thoracic metastatic disease.   2.  Interval left breast implant rupture.  11/2023: DXA scan  NORMAL. Bone mineral density measurements are within normal limits using T score.        Symptoms,  Raffi was seen for an initial virtual evaluation today.  She complains of significant right groin pain which has been going  on for a few years since 2021, and started during the year of her surgery. It was thought that it was nerve pain. Pain feels like a burning sensation that wraps around from her back into her pelvic area and sometimes is stabbing in nature.   She recently started taking gabapentin recently, more for hot flashes but was hoping it would help with these symptoms. She has been taking gabapentin for 2-3 weeks, and might be noticing some improvement with the nerve pain. She is currently taking 200 mg gabapentin BID. She has not noticed increased fatigue.   Her groin symptoms are worse at night and pain is also worse with activity.  She has been to pelvic rehab for her bladder symptoms because she has pelvic leakage.     In terms of her fatigue, she has struggled with it within the last year. She is not falling asleep in the middle of the day. She struggles with falling asleep at night and wakes up multiple times at night, recently did a sleep study and has not gotten the results. She has tried melatonin and it did help but she still woke up at night to go to the bathroom and because of anxiety. She was taking 10 mg melatonin. She also has a prescription for medical cannabis which she started taking, and she feels like it helps.   In terms of her nutrition, she struggles. She doesn't really like eating, and she tends to eat protein bars for breakfast and lunch and a regular dinner. She is getting about 70 gms per day.   She recently started hot yoga. She was previously doing crossfit but it was too hard on her body. She also walks her dog 1.5 miles three times per week.       Therapies/HEP,  Currently seeing pelvic PT and physical therapy for her back.      Functionally,   Independent with mobility, ADLs and IADLs.             Past Medical and Surgical History:     Past Medical History:   Diagnosis Date    Arthritis 2020    Joint pain in fingers, ankles, back, wrists, shoukders, etc    Atypical squamous cells of  undetermined significance (ASCUS) on Papanicolaou smear of cervix     Formatting of this note might be different from the original.  02/04/2010 ASCUS/HPV Negative.    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    Depressive disorder 5/2023    Major depressive elisode and anxiety    Hydronephrosis      Past Surgical History:   Procedure Laterality Date    ABDOMEN SURGERY  2019 and 2029    Hysterectomy and tumour removal - BL oopherectomy    BREAST SURGERY  2007    Breast augmentation    COLONOSCOPY  2021    Polyps removed-repeat in 5 years    GENITOURINARY SURGERY  2001    Tibal ligation    HYSTERECTOMY VAGINAL, BILATERAL SALPINGO-OOPHERECTOMY, COMBINED      December 2019- and Feb 2020    HYSTERECTOMY, PAP NO LONGER INDICATED      at 45yo            Social History:     Social History     Tobacco Use    Smoking status: Never     Passive exposure: Past    Smokeless tobacco: Never   Substance Use Topics    Alcohol use: Yes     Comment: Occasional 2x month     Living situation: Lives in [facility]         Functional history:     Orquidea Arevalo is independent with all aspects of her life.    ADLs: Independent  Assistive devices: None  iADLs (medication management and finances): Independent           Family History:     Family History   Problem Relation Age of Onset    Hypertension Mother     Depression Mother     Obesity Mother     Heart Surgery Mother 70        PPM placement  - [place] in the 40s and then 130s?    Atrial fibrillation Mother     Hypertension Father     Other Cancer Father         Basal cell on face and neck    Substance Abuse Father     Mental Illness Brother         Boderline personality disorder    Substance Abuse Brother     Obesity Brother     Anxiety Disorder Maternal Grandmother     Diabetes Maternal Grandfather     Cancer Paternal Grandfather         ?            Medications:     Current  Outpatient Medications   Medication Sig Dispense Refill    acetaminophen (TYLENOL) 500 MG tablet Take 500-1,000 mg by mouth every 6 hours as needed for mild pain      buPROPion (WELLBUTRIN XL) 300 MG 24 hr tablet       cyclobenzaprine (FLEXERIL) 10 MG tablet Take 1 tablet (10 mg) by mouth nightly as needed for muscle spasms 30 tablet 3    gabapentin (NEURONTIN) 100 MG capsule Take 1 capsule (100 mg) by mouth at bedtime for 7 days, THEN 1 capsule (100 mg) 2 times daily for 7 days, THEN 1 capsule (100 mg) 3 times daily for 7 days, THEN 2 capsules (200 mg) 3 times daily for 7 days, THEN 3 capsules (300 mg) 3 times daily for 62 days. Continue 300 mg three times daily.  If you experience side effects decrease to last tolerated dose and contact the clinic.  If not receiving desired effect contact the clinic. 675 capsule 0    ibuprofen (ADVIL/MOTRIN) 200 MG tablet Take 200 mg by mouth every 4 hours as needed for pain      LORazepam (ATIVAN) 0.5 MG tablet Take 0.5 mg by mouth as needed      meclizine (ANTIVERT) 25 MG tablet Take 1 tablet (25 mg) by mouth 3 times daily as needed for dizziness 20 tablet 1    mirabegron (MYRBETRIQ) 25 MG 24 hr tablet Take 1 tablet (25 mg) by mouth daily 30 tablet 3    ondansetron (ZOFRAN) 4 MG tablet Take 1 tablet (4 mg) by mouth every 8 hours as needed for nausea or vomiting 30 tablet 4    sertraline (ZOLOFT) 50 MG tablet Take 75 mg by mouth daily      topiramate (TOPAMAX) 25 MG tablet TAKE 1 TABLET AT BEDTIME FOR 7 DAYS THEN 1 TWICE DAILY FOR 7 DAYS THEN 2 TABLETS BY MOUTH AT BEDTIME AND 1 IN THE MORNING FOR 7 DAYS THEN 2 TWICE DAILY              Allergies:     Allergies   Allergen Reactions    Ciprofloxacin Dizziness              ROS:     A focused ROS is negative other than the symptoms noted above in the HPI.           Physical Examiniation:     VITAL SIGNS: LMP  (LMP Unknown)   BMI: Estimated body mass index is 29.75 kg/m  as calculated from the following:    Height as of 5/8/24: 1.676 m  "(5' 6\").    Weight as of 5/8/24: 83.6 kg (184 lb 4.8 oz).    Gen: NAD, pleasant and cooperative   HEENT: Atraumatic, normocephalic, extraocular movements appear intact  Pulm: non-labored breathing in room air  Neuro/MSK:   Orientation: Oriented to person, time, place and situation, Exhibits good insight into her condition and ongoing treatment  Motor: Observed moving upper extremities actively against gravity             Laboratory/Imaging:     XR Cervical Spine 2/3 Views (4/16/24):                                                                 IMPRESSION: There is slight reversal usual cervical lordosis with the apex at C5. Cervical vertebral body heights are maintained. Mild loss of disc space height at C5-C6 and C6-C7 with marginal endplate osteophytes. No prevertebral soft tissue swelling. The visualized portions of the lungs are clear.           Assessment/Plan:   Orquidea Arevalo is a 48 year old female with history of endometrial cancer who [facility] for evaluation of her rehabilitation needs in the setting of right groin pain and fatigue.  Multiple rehabilitation considerations were discussed with Raffi at today's visit.  We reviewed the option of occupational therapy for fatigue management strategies and patient was also educated on multifactorial nature of cancer related fatigue and the impact.  She would like to try OT, so this referral was placed today.  In addition, we reviewed nutritional recommendations including protein supplementation which she is already doing, and a nutrition referral for further optimization.  She would like this, so the referral was placed.  Her groin pain seems very neuropathic at this point based on history, and we will adjust her gabapentin dose to increase her bedtime dose and see how she tolerates this and if it helps improve symptoms further.  She was instructed to do this for 3 weeks, and if afternoon hours are difficult, we can consider " adding an afternoon dose at that time.  Her prescription was changed to reflect the increased bedtime dose.  Lastly we discussed that pelvic PT can possibly help with some manual therapy and stretches for the right groin which can potentially alleviate the neuropathic pain as well.  We will plan to have her return for a virtual visit in 3 to 4 months.  She is in agreement with this plan.      Patient education: In depth discussion and education was provided about the assessment and implications of each of the below recommendations for management. Patient indicated readiness to learn, all questions were answered and understanding of material presented was confirmed.  Therapy/equipment/braces:  Occupational Therapy referral placed for fatigue management.  Nutrition referral placed for nutritional recommendations for diet, supplementation and variable appetite.  Patient to follow with pelvic PT as planned for bladder leakage, and will also discuss manual therapy to help with right groin pain to relieve neuropathic symptoms.  Continue PT for back.  Medications:  Gabapentin prescription adjusted as patient is not taking as ramp up was originally prescribed per chart review, and she indicated that prescription ramp up was a little confusing. Plan is to have her take 200 mg in the morning, and we will increase her bedtime dose to 300 mg. So she will take it 200 mg am/300 mg HS BID. Then after 3 weeks if this is working well and she would like to add an afternoon dose, she will reach out and we can add an afternoon 200 mg dose at that time. Her prescription was adjusted to reflect new dosing.  Follow up: 3-4 months.    Rozina Dobson MD  Physical Medicine & Rehabilitation    I appreciate the opportunity to participate in the care of your patient.     90 minutes spent on the date of the encounter doing chart review, history and exam, documentation and further activities as noted above.

## 2024-05-23 NOTE — PATIENT INSTRUCTIONS
It was nice to meet you today.    1. Continue pelvic PT as planned. You will check in with them to see if they can do any manual therapy to help with your right groin muscles help with your symptoms.  2. Continue PT for your back.  3. A nutrition referral was placed.  4. An occupational therapy referral was placed for fatigue management.  5. Gabapentin prescription was adjusted today: you should take 200 mg in the morning and increase your bedtime dose to 300 mg. So you will be taking it at 200 mg morning/300 mg at bedtime. Do this for 3 weeks. Then at 3 weeks, if this is working well and you'd like to add an afternoon dose, please reach out to Dr. Dobson and we can add an afternoon dose of 200 mg.  6. Follow up with Dr. Dobson in 3-4 months for a return virtual visit.

## 2024-05-23 NOTE — PROGRESS NOTES
Virtual Visit Details    Type of service:  Video Visit     Distant Location (provider location):  On-site  Platform used for Video Visit: [facility]           PM&R Clinic Note     Patient Name: Orquidea Arevalo : 1975 Medical Record: [id number]     Requesting Physician/clinician: Sheba Chowdary MD           History of Present Illness:     Orquidea Arevalo is a 48 year old female with history of endometrial cancer who presents [facility] for evaluation of her rehabilitation needs in the setting of right groin pain and fatigue.    Oncology History:  2019 Surgery and Procedures with OB/GYN  total laparoscopic hysterectomy, bilateral salpingectomy with only right oophorectomy. A presumed fibroid mass wrapped around a ligament. Left ovary appeared normal. 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 [place] did not see it involving the fallopian tube but such was recorded/reported.  ([facility]: 14.5 cm LGESS extending into 1 FT; LVSI +)  2019, CT chest abdomen pelvis shows right and left pelvic masses possibly representing metastatic disease. Chest imaging was not definitive for metastases with some enlarged but not significantly enlarged nodes:  Postoperative changes of hysterectomy, bilateral salpingectomy, and right  oophorectomy.   Within the right adnexal region there is a 3.9 x 3.3 cm hyperenhancing soft  tissue mass concerning for metastatic disease or local recurrence. Expansive  filling defect within the right internal iliac vein that extends cranially to  the level of the mid right common iliac vein (series 4, image 166; series 605,  image 65). The density is similar to the right adnexal mass, which is concerning for tumor thrombus. No evidence of DVT in the right external iliac or common femoral veins.  Within the left adnexa there is a 7.1 x 3.7 cm loculated cystic lesion.   Multiple tiny hypodense hepatic lesions most likely  represent benign  hemangiomas/cysts, but are indeterminate for metastases. The gallbladder,  pancreas, adrenal glands, and kidneys are normal. Small splenules. No  lymphadenopathy in the abdomen or pelvis. Normal caliber small and large bowel. No aggressive osseous lesions.  1/17/2020 Tumor Board   Path review: LGESS arising from the myometrium perhaps extending to uterine cornua but no evidence of the slides submitted that there is any disease on a fallopian tube or ovary  Rad review: Some LGESS may have FDG update on PET; mediastinal node is suspicious   Biopsy of the mediastinal node and if positive, consider resection vs. adjuvant treatment (hormone therapy) after surgery  Return to the OR for resection of residual disease and LSO  2/10/2020 Surgery and Procedures:02/10/2020 EXPLORATORY LAPAROTOMY., LEFT OOPHORECTOMY, RESECTION PELVIC MASS., UPPER VAGINECTOMY, CYSTOSCOPY, INSERTION STENT URETER., Resection of right internal iliac vein with intravenous tumor. Repair of right common iliac vein., Lymphadenectomy   Surgery  Exlap, resection of pelvic disease remaining from original surgery elsewhere. Resection of tumor mass within the right pelvic iliac vein system.   Complete gross resection.   Stage IIB  3/7/2020 - Biological/Targeted/Hormone Therapy   Letrozole 2.5 daily. At first month, nausea is minimal. Hot flashes are ok. Working from home and staying active. 2-3 mi walking 5 days. CrossFit 40 min three times weekly. Baseline bone mineral density is good, no osteopenia.  3/2023: Letrozole stopped due to side effects   3/20/23: CT CAP  COMPARISON:  Multiple priors, most recently CT 06/11/2022   FINDINGS:     Prior hysterectomy, bilateral salpingo-oophorectomy, right iliac vein resection, and partial vaginectomy for endometrial stromal sarcoma. Postsurgical changes and surgical clips in the pelvis.   There is tethering of the right distal ureter in the pelvis with mild associated urothelial thickening and  enhancement, for example series 1, image 111. Mild upstream hydroureter without hydronephrosis. The contralateral ureter is normal. The bladder is within expected limits. No recurrent soft tissue in the pelvis or along the iliac veins. No new adenopathy or free fluid.   Multiple hypoattenuating lesions in the liver are stable dating back to 01/22/2020 and likely represent benign cysts. No suspicious hepatic masses. Non-cirrhotic morphology of the liver with   patent portal and hepatic veins.   The gallbladder and biliary tree are normal. No suspicious pancreatic masses. The adrenal glands and   kidneys are normal. Normal spleen with 2 small splenules. The bowel is within expected limits. Tiny fat-containing umbilical hernia. No aggressive osseous lesions. A CT chest was performed at the same time which will be reported separately.   IMPRESSION:   1.  No findings of recurrence in the abdomen or pelvis.   2.  A CT chest was performed at the same time which will be reported separately.  CT chest:  FINDINGS:   This examination was performed in conjunction with a CT of the abdomen, which will be reported separately.   No suspicious lung nodule. No bulky thoracic lymphadenopathy. No suspicious osseous lesion. No pleural effusion. Interval left breast implant rupture. Right breast implant appears similar since prior exam.   IMPRESSION:   1.  No thoracic metastatic disease.   2.  Interval left breast implant rupture.  11/2023: DXA scan  NORMAL. Bone mineral density measurements are within normal limits using T score.        Symptoms,  Raffi was seen for an initial virtual evaluation today.  She complains of significant right groin pain which has been going on for a few years since 2021, and started during the year of her surgery. It was thought that it was nerve pain. Pain feels like a burning sensation that wraps around from her back into her pelvic area and sometimes is stabbing in nature.   She recently started taking  gabapentin recently, more for hot flashes but was hoping it would help with these symptoms. She has been taking gabapentin for 2-3 weeks, and might be noticing some improvement with the nerve pain. She is currently taking 200 mg gabapentin BID. She has not noticed increased fatigue.   Her groin symptoms are worse at night and pain is also worse with activity.  She has been to pelvic rehab for her bladder symptoms because she has pelvic leakage.     In terms of her fatigue, she has struggled with it within the last year. She is not falling asleep in the middle of the day. She struggles with falling asleep at night and wakes up multiple times at night, recently did a sleep study and has not gotten the results. She has tried melatonin and it did help but she still woke up at night to go to the bathroom and because of anxiety. She was taking 10 mg melatonin. She also has a prescription for medical cannabis which she started taking, and she feels like it helps.   In terms of her nutrition, she struggles. She doesn't really like eating, and she tends to eat protein bars for breakfast and lunch and a regular dinner. She is getting about 70 gms per day.   She recently started hot yoga. She was previously doing crossfit but it was too hard on her body. She also walks her dog 1.5 miles three times per week.       Therapies/HEP,  Currently seeing pelvic PT and physical therapy for her back.      Functionally,   Independent with mobility, ADLs and IADLs.             Past Medical and Surgical History:     Past Medical History:   Diagnosis Date    Arthritis 2020    Joint pain in fingers, ankles, back, wrists, shoukders, etc    Atypical squamous cells of undetermined significance (ASCUS) on Papanicolaou smear of cervix     Formatting of this note might be different from the original.  02/04/2010 ASCUS/HPV Negative.    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/4/2019     Endometrial Stromal Sarcoma    Depressive disorder 5/2023    Major depressive elisode and anxiety    Hydronephrosis      Past Surgical History:   Procedure Laterality Date    ABDOMEN SURGERY  2019 and 2029    Hysterectomy and tumour removal - BL oopherectomy    BREAST SURGERY  2007    Breast augmentation    COLONOSCOPY  2021    Polyps removed-repeat in 5 years    GENITOURINARY SURGERY  2001    Tibal ligation    HYSTERECTOMY VAGINAL, BILATERAL SALPINGO-OOPHERECTOMY, COMBINED      December 2019- and Feb 2020    HYSTERECTOMY, PAP NO LONGER INDICATED      at 43yo            Social History:     Social History     Tobacco Use    Smoking status: Never     Passive exposure: Past    Smokeless tobacco: Never   Substance Use Topics    Alcohol use: Yes     Comment: Occasional 2x month     Living situation: Lives in [facility]         Functional history:     Orquidea Arevalo is independent with all aspects of her life.    ADLs: Independent  Assistive devices: None  iADLs (medication management and finances): Independent           Family History:     Family History   Problem Relation Age of Onset    Hypertension Mother     Depression Mother     Obesity Mother     Heart Surgery Mother 70        PPM placement  - [place] in the 40s and then 130s?    Atrial fibrillation Mother     Hypertension Father     Other Cancer Father         Basal cell on face and neck    Substance Abuse Father     Mental Illness Brother         Boderline personality disorder    Substance Abuse Brother     Obesity Brother     Anxiety Disorder Maternal Grandmother     Diabetes Maternal Grandfather     Cancer Paternal Grandfather         ?            Medications:     Current Outpatient Medications   Medication Sig Dispense Refill    acetaminophen (TYLENOL) 500 MG tablet Take 500-1,000 mg by mouth every 6 hours as needed for mild pain      buPROPion (WELLBUTRIN XL) 300 MG 24 hr tablet       cyclobenzaprine (FLEXERIL) 10 MG tablet Take 1 tablet (10 mg) by  "mouth nightly as needed for muscle spasms 30 tablet 3    gabapentin (NEURONTIN) 100 MG capsule Take 1 capsule (100 mg) by mouth at bedtime for 7 days, THEN 1 capsule (100 mg) 2 times daily for 7 days, THEN 1 capsule (100 mg) 3 times daily for 7 days, THEN 2 capsules (200 mg) 3 times daily for 7 days, THEN 3 capsules (300 mg) 3 times daily for 62 days. Continue 300 mg three times daily.  If you experience side effects decrease to last tolerated dose and contact the clinic.  If not receiving desired effect contact the clinic. 675 capsule 0    ibuprofen (ADVIL/MOTRIN) 200 MG tablet Take 200 mg by mouth every 4 hours as needed for pain      LORazepam (ATIVAN) 0.5 MG tablet Take 0.5 mg by mouth as needed      meclizine (ANTIVERT) 25 MG tablet Take 1 tablet (25 mg) by mouth 3 times daily as needed for dizziness 20 tablet 1    mirabegron (MYRBETRIQ) 25 MG 24 hr tablet Take 1 tablet (25 mg) by mouth daily 30 tablet 3    ondansetron (ZOFRAN) 4 MG tablet Take 1 tablet (4 mg) by mouth every 8 hours as needed for nausea or vomiting 30 tablet 4    sertraline (ZOLOFT) 50 MG tablet Take 75 mg by mouth daily      topiramate (TOPAMAX) 25 MG tablet TAKE 1 TABLET AT BEDTIME FOR 7 DAYS THEN 1 TWICE DAILY FOR 7 DAYS THEN 2 TABLETS BY MOUTH AT BEDTIME AND 1 IN THE MORNING FOR 7 DAYS THEN 2 TWICE DAILY              Allergies:     Allergies   Allergen Reactions    Ciprofloxacin Dizziness              ROS:     A focused ROS is negative other than the symptoms noted above in the HPI.           Physical Examiniation:     VITAL SIGNS: LMP  (LMP Unknown)   BMI: Estimated body mass index is 29.75 kg/m  as calculated from the following:    Height as of 5/8/24: 1.676 m (5' 6\").    Weight as of 5/8/24: 83.6 kg (184 lb 4.8 oz).    Gen: NAD, pleasant and cooperative   HEENT: Atraumatic, normocephalic, extraocular movements appear intact  Pulm: non-labored breathing in room air  Neuro/MSK:   Orientation: Oriented to person, time, place and situation, " Exhibits good insight into her condition and ongoing treatment  Motor: Observed moving upper extremities actively against gravity             Laboratory/Imaging:     XR Cervical Spine 2/3 Views (4/16/24):                                                                 IMPRESSION: There is slight reversal usual cervical lordosis with the apex at C5. Cervical vertebral body heights are maintained. Mild loss of disc space height at C5-C6 and C6-C7 with marginal endplate osteophytes. No prevertebral soft tissue swelling. The visualized portions of the lungs are clear.           Assessment/Plan:   Orquidea Arevalo is a 48 year old female with history of endometrial cancer who presents [facility] for evaluation of her rehabilitation needs in the setting of right groin pain and fatigue.  Multiple rehabilitation considerations were discussed with Raffi at today's visit.  We reviewed the option of occupational therapy for fatigue management strategies and patient was also educated on multifactorial nature of cancer related fatigue and the impact.  She would like to try OT, so this referral was placed today.  In addition, we reviewed nutritional recommendations including protein supplementation which she is already doing, and a nutrition referral for further optimization.  She would like this, so the referral was placed.  Her groin pain seems very neuropathic at this point based on history, and we will adjust her gabapentin dose to increase her bedtime dose and see how she tolerates this and if it helps improve symptoms further.  She was instructed to do this for 3 weeks, and if afternoon hours are difficult, we can consider adding an afternoon dose at that time.  Her prescription was changed to reflect the increased bedtime dose.  Lastly we discussed that pelvic PT can possibly help with some manual therapy and stretches for the right groin which can potentially alleviate the neuropathic pain as well.  We  will plan to have her return for a virtual visit in 3 to 4 months.  She is in agreement with this plan.      Patient education: In depth discussion and education was provided about the assessment and implications of each of the below recommendations for management. Patient indicated readiness to learn, all questions were answered and understanding of material presented was confirmed.  Therapy/equipment/braces:  Occupational Therapy referral placed for fatigue management.  Nutrition referral placed for nutritional recommendations for diet, supplementation and variable appetite.  Patient to follow with pelvic PT as planned for bladder leakage, and will also discuss manual therapy to help with right groin pain to relieve neuropathic symptoms.  Continue PT for back.  Medications:  Gabapentin prescription adjusted as patient is not taking as ramp up was originally prescribed per chart review, and she indicated that prescription ramp up was a little confusing. Plan is to have her take 200 mg in the morning, and we will increase her bedtime dose to 300 mg. So she will take it 200 mg am/300 mg HS BID. Then after 3 weeks if this is working well and she would like to add an afternoon dose, she will reach out and we can add an afternoon 200 mg dose at that time. Her prescription was adjusted to reflect new dosing.  Follow up: 3-4 months.    Rozina Dobson MD  Physical Medicine & Rehabilitation    I appreciate the opportunity to participate in the care of your patient.     90 minutes spent on the date of the encounter doing chart review, history and exam, documentation and further activities as noted above.

## 2024-05-24 NOTE — TELEPHONE ENCOUNTER
[facility]: Physical Medicine and Rehabilitation                                                                                   Called patient in regards to canceled appointment with Dr. Rozina Dobson.  She had been previously referred to by [facility] for groin pain and neuropathy over a year ago.  She has canceled several appointments.  Confirmed today via telephone patient wishes to continue with this referral    Appointment was scheduled and new referral  was replaced      Minal Armstrong LPN  Oncology PM&R Care Coordination  [phone]

## 2024-05-28 ENCOUNTER — THERAPY VISIT (OUTPATIENT)
Dept: PHYSICAL THERAPY | Facility: CLINIC | Age: 49
End: 2024-05-28
Attending: NURSE PRACTITIONER
Payer: COMMERCIAL

## 2024-05-28 DIAGNOSIS — G89.29 CHRONIC NECK PAIN: ICD-10-CM

## 2024-05-28 DIAGNOSIS — M89.8X1 PAIN OF RIGHT SCAPULA: ICD-10-CM

## 2024-05-28 DIAGNOSIS — M54.50 CHRONIC MIDLINE LOW BACK PAIN WITHOUT SCIATICA: Primary | ICD-10-CM

## 2024-05-28 DIAGNOSIS — R20.0 NUMBNESS AND TINGLING OF RIGHT LEG: ICD-10-CM

## 2024-05-28 DIAGNOSIS — M54.2 CHRONIC NECK PAIN: ICD-10-CM

## 2024-05-28 DIAGNOSIS — G89.29 CHRONIC MIDLINE LOW BACK PAIN WITHOUT SCIATICA: Primary | ICD-10-CM

## 2024-05-28 DIAGNOSIS — M51.369 BULGING LUMBAR DISC: ICD-10-CM

## 2024-05-28 DIAGNOSIS — R20.2 NUMBNESS AND TINGLING OF RIGHT LEG: ICD-10-CM

## 2024-05-28 PROCEDURE — 97110 THERAPEUTIC EXERCISES: CPT | Mod: GP | Performed by: PHYSICAL THERAPIST

## 2024-05-28 NOTE — PROGRESS NOTES
"  All weight progressions in therapy sessions are dictated by the patient tolerance and therapist discretion. Testing on MedX equipment is completed on 4-week intervals to allow for physiological change in muscle structure and neuromuscular re-education.    Lumbar MedX Initial testing   5/21/24   AROM (full=  0-72  lumbar) 0-36   Max Extension Torque  108   Flex: ext ratio (ideal 1.4:1) 2.35:1     Cervical MedX Initial testing  5/14/24   AROM (full= 0-120  cervical) 27-78   Max Extension Torque  129   Flex: ext ratio (ideal 1.4:1) 1.19:1     Date 5/28/24 5/21/24 5/14/24   Lumbar Parameters      Top Dead Center (TDC) 18 18    Counterbalance (CB) 200 200    Seat Pad 1 1    Femur Restraint 6 6    Week/Visit      Enter Week/Visit # Wk 2 V 1 Wk 1 V 2 Wk 1 V 1   Weight (lbs) 57# 54#    Reps (#) 17 15    Time 116s 128s    ROM (degrees) 0-36 0-36    Pain Pain from roller pressure No pain    Therapist cues      Cervical Parameters      Top Dead Center (TDC) 57 57 57   Counterbalance (CB) 1.0 1.0 1.0   Seat Height 453 453 453   Week/Visit      Enter Week/Visit # Wk 2 V1 Wk 1 V 2 Wk 1 V 1   Weight (lbs) 108# 99# --   Reps (#) 20 13 --   Time 142s 99    ROM (degrees) 18-90 27-84 27-78   Pain No pain \"Tense\"    Therapist cues        Date 5/28/24 5/21/24 5/14/24   Exercise      treadmill 4 min 4 min warm up    Rotary Torso,   90 seconds B 22# to L 20# to R    Chin tuck and scapular retraction   Hold 10 seconds x 10 reps   Tennis or lacrosse ball for TP release in rhomboid   today   Stretches: upper trapezius, levator scapulae, scalene, rhomboid Hold 30 seconds X 1-3 reps, B     Stretches: thread the needle, single knee to chest, quadratus lumborum, piriformis above 90 Hold 30 seconds x 1-3 reps                                                 Modifications instructed by therapist:   "

## 2024-06-03 NOTE — PROGRESS NOTES
This HSAT was performed using a Noxturnal T3 device which recorded snore, sound, movement activity, body position, nasal pressure, oronasal thermal airflow, pulse, oximetry and both chest and abdominal respiratory effort. HSAT data was restricted to the time patient states they were in bed.     HSAT was scored using 1B 4% hypopnea rule.     AHI: 16.0  Snoring was reported as moderate.  Time with SpO2 below 89% was 6.2 minutes.   Overall signal quality was good     Pt will follow up with sleep provider to determine appropriate therapy.     Ordering Provider, Breanna Bejarano MD C. Oyugi, BA, RPSGT, RST System Clinical Specialist/ 6/3/2024

## 2024-06-11 ENCOUNTER — MYC MEDICAL ADVICE (OUTPATIENT)
Dept: SLEEP MEDICINE | Facility: CLINIC | Age: 49
End: 2024-06-11
Payer: COMMERCIAL

## 2024-06-12 NOTE — PROCEDURES
"HOME SLEEP STUDY INTERPRETATION        Patient: Orquidea Arevalo  MRN: [id number]  YOB: 1975  Study Date: 2024  PCP/Referring Provider: Sheba Jane;   Ordering Provider: Breanna Bejarano MD.         Indications for Home Study: Orquidea Arevalo is a 48 year old female with a history of Generalized anxiety, Major Depression who is evaluated for suspected sleep apnea.      Estimated body mass index is 28.29 kg/m  as calculated from the following:    Height as of 24: 1.651 m (5' 5\").    Weight as of 24: 77.1 kg (170 lb).  [place] Sleepiness Scale:   STOP-BAN/8        Data: A full night home sleep study was performed recording the standard physiologic parameters including body position, movement, sound, nasal pressure, thermal oral airflow, chest and abdominal movements with respiratory inductance plethysmography, and oxygen saturation by pulse oximetry. Pulse rate was estimated by oximetry recording. This study was considered adequate based on > 4 hours of quality oximetry and respiratory recording. As specified by the AASM Manual for the Scoring of Sleep and Associated events, version 2.3, Rule VIII.D 1B, 4% oxygen desaturation scoring for hypopneas is used as a standard of care on all home sleep apnea testing.        Analysis Time:  460.8 minutes        Respiration:   Sleep Associated Hypoxemia: sustained hypoxemia was present. Baseline oxygen saturation was 91%.  Time with saturation less than or equal to 88% was 6.2 minutes. The lowest oxygen saturation was 83%.   Snoring: Snoring was present.  Respiratory events: The home study revealed a presence of 25 obstructive apneas and 42 mixed and central apneas. There were 56 hypopneas resulting in a combined apnea/hypopnea index [AHI] of 16 events per hour.  AHI was 38.4 per hour supine, 0 per hour prone, 10.6 per hour on left side, and 11.5 per hour on right side.   Pattern: Excluding events noted above, respiratory rate and " pattern was Normal.      Position: Percent of time spent: supine - 18%, prone - 0%, on left - 35.5%, on right - 46.6%.      Heart Rate: By pulse oximetry normal rate (low normal) was noted.       Assessment:   Moderate obstructive sleep apnea.  Sleep associated hypoxemia was present.    Recommendations:  Consider auto-CPAP at 5-15 cmH2O, oral appliance therapy, or positional therapy.  Suggest optimizing sleep hygiene and avoiding sleep deprivation.  Weight management.        Diagnosis Code(s): Obstructive Sleep Apnea G47.33, Hypoxemia G47.36, Snoring R06.83    Electronically signed by: Darci Carlos MD, June 11, 2024   Diplomate, American Board of Internal Medicine, Sleep Medicine

## 2024-06-20 ENCOUNTER — THERAPY VISIT (OUTPATIENT)
Dept: PHYSICAL THERAPY | Facility: CLINIC | Age: 49
End: 2024-06-20
Payer: COMMERCIAL

## 2024-06-20 DIAGNOSIS — M62.89 HIGH-TONE PELVIC FLOOR DYSFUNCTION: ICD-10-CM

## 2024-06-20 DIAGNOSIS — M79.18 MYALGIA OF PELVIC FLOOR: ICD-10-CM

## 2024-06-20 DIAGNOSIS — N32.81 OAB (OVERACTIVE BLADDER): ICD-10-CM

## 2024-06-20 DIAGNOSIS — C54.1 ENDOMETRIAL STROMAL SARCOMA (H): ICD-10-CM

## 2024-06-20 DIAGNOSIS — N39.46 MIXED STRESS AND URGE URINARY INCONTINENCE: ICD-10-CM

## 2024-06-20 PROCEDURE — 97161 PT EVAL LOW COMPLEX 20 MIN: CPT | Mod: GP | Performed by: PHYSICAL THERAPIST

## 2024-06-20 PROCEDURE — 97140 MANUAL THERAPY 1/> REGIONS: CPT | Mod: GP | Performed by: PHYSICAL THERAPIST

## 2024-06-20 PROCEDURE — 97535 SELF CARE MNGMENT TRAINING: CPT | Mod: GP | Performed by: PHYSICAL THERAPIST

## 2024-06-20 NOTE — PROGRESS NOTES
PHYSICAL THERAPY EVALUATION  Type of Visit: Evaluation        Fall Risk Screen:  Fall screen completed by: PT  Have you fallen 2 or more times in the past year?: No  Have you fallen and had an injury in the past year?: No  Is patient a fall risk?: No    Subjective       Pt reporting continued constipation, CRISS, dyspareunia. Pt reporting CRISS with coughing, sneezing, sometimes with just walking. Pt went back to [facility] and had a cystoscopy, considering surgical intervention. Pt reporting frequency is a little better on Myrbetriq. Urgency a little better, Sometimes having really weak stream. Pain with penetration every time especially initial penetration, with some bleeding. Pt reporting still only having BMs 2x per week, does have squatty potty.   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    Prior diagnostic imaging/testing results: CT scan; Video fluoroscopic swallow study     Prior therapy history for the same diagnosis, illness or injury: Yes        Living Environment  Social support: With a significant other or spouse   Type of home: House; Multi-level   Stairs to enter the home: Yes       Ramp: No   Stairs inside the home: Yes 18 Is there a railing: Yes     Help at home: None  Equipment owned:       Employment: No    Hobbies/Interests: yoga , art , guitar    Patient goals for therapy: stop wearing pads for leakage         Objective      PELVIC EVALUATION  ADDITIONAL HISTORY:  Sex assigned at birth: Female  Gender identity: Female    Pronouns: She/Her Hers      Bladder History:  Feels bladder filling: Yes  Triggers for feeling of inability to wait to go to the bathroom: No    How long can you wait to urinate: depends on how bad i need to go  Gets up at  night to urinate: Yes 1 or 2  Can stop the flow of urine when urinating: Sometimes  Volume of urine usually released: Small   Other issues: Slow or hesitant urine stream; Dribbling after urinating  Number of bladder infections in last 12 months:    Fluid intake per day: 80 12    Medications taken for bladder: Yes myrbetriq   Activities causing urine leak: Cough; Sneeze; Jump; Run; Hurrying to the bathroom due to a strong urge to urinate (pee)    Amount of urine typically leaked: depends  Pads used to help with leaking: Yes I use this many pads per day: 3   I use this type/brand: playtex      Bowel History:  Frequency of bowel movement: 1 or 2 per week  Consistency of stool: Hard    Ignores the urge to defecate: No  Other bowel issues: Pain when pooping; Straining to have bowel movement  Length of time spent trying to have a bowel movement:      Sexual Function History:  Sexual orientation: Straight    Sexually active:    Lubrication used:      Pelvic pain: Certain positions; Initial penetration (rectal or vaginal); Deep penetration (rectal or vaginal) when nerve pain flares, it's any position  Pain or difficulty with orgasms/erection/ejaculation: No    State of menopause: During menopause (I am in menopause now)  Hormone medications: No      Are you currently pregnant: No  Number of previous pregnancies: 3  Number of deliveries: 3  If you have delivered before, did you have any of these issues during delivery: Tearing; Episiotomy; Vaginal delivery  Have you been diagnosed with pelvic prolapse or abdominal separation: No  Do you get regular exercise: Yes  I do this type of exercise: yoga and walking  Have you tried pelvic floor strengthening exercises for 4 weeks: No  Do you have any history of trauma that is relevant to your care that you d like to share: Yes, I d like to discuss it with my provider in person.      Discussed reason for referral regarding pelvic health needs and external/internal pelvic floor muscle  examination with patient/guardian.  Opportunity provided to ask questions and verbal consent for assessment and intervention was given.    BREATHING SYMMETRY:  belly breathing     PELVIC EXAM  External Visual Inspection:  At rest: Normal  With voluntary pelvic floor contraction: Present  Relaxation of PFM: Partial/delayed relaxation    Integumentary:   Introitus: Unremarkable    External Digital Palpation per Perineum:   Ischiocavernosis: Unremarkable  Bulbo cavernosis: Unremarkable  Transverse perineal: Unremarkable  Levator ani: Tightness    Internal Digital Palpation:  Per Vagina:  Tenderness  Myofascial Resistance to Palpation: Firm  Digital Muscle Performance: P (Power): 3/5  Compensations: Gluts    Assessment & Plan   CLINICAL IMPRESSIONS  Medical Diagnosis: mixed incontinence    Treatment Diagnosis: ALLY, constipation, dyspareunia   Impression/Assessment: Patient is a 48 year old female with pelvic floor complaints.  The following significant findings have been identified: Pain, Decreased ROM/flexibility, Decreased strength, Impaired muscle performance, and Decreased activity tolerance. These impairments interfere with their ability to perform self care tasks, recreational activities, and household mobility as compared to previous level of function.     Clinical Decision Making (Complexity):  Clinical Presentation: Stable/Uncomplicated  Clinical Presentation Rationale: based on medical and personal factors listed in PT evaluation  Clinical Decision Making (Complexity): Low complexity    PLAN OF CARE  Treatment Interventions:  Interventions: Manual Therapy, Neuromuscular Re-education, Therapeutic Activity, Therapeutic Exercise, Self-Care/Home Management    Long Term Goals     PT Goal 1  Goal Identifier: pad usage  Goal Description: pt will reduce pad usage from 3 per day to 1 per day  Rationale: to maximize safety and independence with performance of ADLs and functional tasks  Target Date: 08/29/24  PT Goal  2  Goal Identifier: dyspareunia  Goal Description: pt will be able to have intercourse with pain <3/10  Rationale: to maximize safety and independence with performance of ADLs and functional tasks  Target Date: 08/29/24  PT Goal 3  Goal Identifier: constipation  Goal Description: pt will be able to have BMs 5+ times per week  Rationale: to maximize safety and independence with performance of ADLs and functional tasks  Target Date: 08/29/24      Frequency of Treatment: once per week  Duration of Treatment: 10 weeks    Recommended Referrals to Other Professionals:  NA  Education Assessment:   Learner/Method: Patient  Education Comments: Pt educated on PT role and plan of care    Risks and benefits of evaluation/treatment have been explained.   Patient/Family/caregiver agrees with Plan of Care.     Evaluation Time:     PT Eval, Low Complexity Minutes (12285): 25     Signing Clinician: Dulce Maria Barrientos PT

## 2024-06-25 ENCOUNTER — THERAPY VISIT (OUTPATIENT)
Dept: PHYSICAL THERAPY | Facility: CLINIC | Age: 49
End: 2024-06-25
Payer: COMMERCIAL

## 2024-06-25 ENCOUNTER — DOCUMENTATION ONLY (OUTPATIENT)
Dept: SLEEP MEDICINE | Facility: CLINIC | Age: 49
End: 2024-06-25
Payer: COMMERCIAL

## 2024-06-25 DIAGNOSIS — G89.29 CHRONIC NECK PAIN: ICD-10-CM

## 2024-06-25 DIAGNOSIS — G47.33 OBSTRUCTIVE SLEEP APNEA (ADULT) (PEDIATRIC): Primary | ICD-10-CM

## 2024-06-25 DIAGNOSIS — G89.29 CHRONIC MIDLINE LOW BACK PAIN WITHOUT SCIATICA: Primary | ICD-10-CM

## 2024-06-25 DIAGNOSIS — M54.50 CHRONIC MIDLINE LOW BACK PAIN WITHOUT SCIATICA: Primary | ICD-10-CM

## 2024-06-25 DIAGNOSIS — M54.2 CHRONIC NECK PAIN: ICD-10-CM

## 2024-06-25 DIAGNOSIS — M51.369 BULGING LUMBAR DISC: ICD-10-CM

## 2024-06-25 DIAGNOSIS — R20.2 NUMBNESS AND TINGLING OF RIGHT LEG: ICD-10-CM

## 2024-06-25 DIAGNOSIS — M89.8X1 PAIN OF RIGHT SCAPULA: ICD-10-CM

## 2024-06-25 DIAGNOSIS — R20.0 NUMBNESS AND TINGLING OF RIGHT LEG: ICD-10-CM

## 2024-06-25 PROCEDURE — 97110 THERAPEUTIC EXERCISES: CPT | Mod: GP | Performed by: PHYSICAL THERAPIST

## 2024-06-25 NOTE — PROGRESS NOTES
"  All weight progressions in therapy sessions are dictated by the patient tolerance and therapist discretion. Testing on MedX equipment is completed on 4-week intervals to allow for physiological change in muscle structure and neuromuscular re-education.    Lumbar MedX Initial testing   5/21/24   AROM (full=  0-72  lumbar) 0-36   Max Extension Torque  108   Flex: ext ratio (ideal 1.4:1) 2.35:1     Cervical MedX Initial testing  5/14/24   AROM (full= 0-120  cervical) 27-78   Max Extension Torque  129   Flex: ext ratio (ideal 1.4:1) 1.19:1     Date 6/25/24 5/28/24 5/21/24 5/14/24   Lumbar Parameters       Top Dead Center (TDC) 18 18 18    Counterbalance (CB) 200 200 200    Seat Pad 1 1 1    Femur Restraint 6 6 6    Week/Visit       Enter Week/Visit # Wk 2 v2 Wk 2 V 1 Wk 1 V 2 Wk 1 V 1   Weight (lbs) 60 57# 54#    Reps (#) 16 17 15    Time 78 116s 128s    ROM (degrees) 0-36 0-36 0-36    Pain  Pain from roller pressure No pain    Therapist cues       Cervical Parameters       Top Dead Center (TDC) 57 57 57 57   Counterbalance (CB) 1.0 1.0 1.0 1.0   Seat Height 453 453 453 453   Week/Visit       Enter Week/Visit # Wk 2 v 2 Wk 2 V1 Wk 1 V 2 Wk 1 V 1   Weight (lbs) 114# 108# 99# --   Reps (#) 20 20 13 --   Time 101 s 142s 99    ROM (degrees) 18-90 18-90 27-84 27-78   Pain  No pain \"Tense\"    Therapist cues         Date 6/25/24 5/28/24 5/21/24 5/14/24   Exercise       treadmill X 4 min 4 min 4 min warm up    Rotary Torso,   90 seconds B 24# to R, hold weight next time 22# to L 20# to R    Chin tuck and scapular retraction    Hold 10 seconds x 10 reps   Tennis or lacrosse ball for TP release in rhomboid    today   Stretches: upper trapezius, levator scapulae, scalene, rhomboid  Hold 30 seconds X 1-3 reps, B     Stretches: thread the needle, single knee to chest, quadratus lumborum, piriformis above 90  Hold 30 seconds x 1-3 reps     4-way neck  SH-  Sidebending X 10 with 20#, could go up                                       "            Modifications instructed by therapist: cued to count reps

## 2024-06-25 NOTE — PROGRESS NOTES
Patient was offered choice of vendor and chose [facility].  Patient Orquidea Arevalo was set up at [place]  on June 25, 2024. Patient received a Resmed Airsense 11 Pressures were set at  5-15 cm H2O.   Patient s ramp is 5 cm H2O for Off and FLEX/EPR is EPR, 2.  Patient received a Resmed Mask name: Airfit [id number]  Pillow mask size Small, heated tubing and heated humidifier.  Patient has the following compliance requirements: none  Patient has a follow up on TBD with Breanna Mariee

## 2024-06-28 ENCOUNTER — APPOINTMENT (OUTPATIENT)
Dept: SLEEP MEDICINE | Facility: CLINIC | Age: 49
End: 2024-06-28
Payer: COMMERCIAL

## 2024-07-02 ENCOUNTER — THERAPY VISIT (OUTPATIENT)
Dept: PHYSICAL THERAPY | Facility: CLINIC | Age: 49
End: 2024-07-02
Payer: COMMERCIAL

## 2024-07-02 DIAGNOSIS — G89.29 CHRONIC NECK PAIN: ICD-10-CM

## 2024-07-02 DIAGNOSIS — R20.2 NUMBNESS AND TINGLING OF RIGHT LEG: ICD-10-CM

## 2024-07-02 DIAGNOSIS — M89.8X1 PAIN OF RIGHT SCAPULA: ICD-10-CM

## 2024-07-02 DIAGNOSIS — R20.0 NUMBNESS AND TINGLING OF RIGHT LEG: ICD-10-CM

## 2024-07-02 DIAGNOSIS — M51.369 BULGING LUMBAR DISC: ICD-10-CM

## 2024-07-02 DIAGNOSIS — M54.50 CHRONIC MIDLINE LOW BACK PAIN WITHOUT SCIATICA: Primary | ICD-10-CM

## 2024-07-02 DIAGNOSIS — G89.29 CHRONIC MIDLINE LOW BACK PAIN WITHOUT SCIATICA: Primary | ICD-10-CM

## 2024-07-02 DIAGNOSIS — M54.2 CHRONIC NECK PAIN: ICD-10-CM

## 2024-07-02 PROCEDURE — 97110 THERAPEUTIC EXERCISES: CPT | Mod: GP | Performed by: PHYSICAL THERAPIST

## 2024-07-02 NOTE — PROGRESS NOTES
"  All weight progressions in therapy sessions are dictated by the patient tolerance and therapist discretion. Testing on MedX equipment is completed on 4-week intervals to allow for physiological change in muscle structure and neuromuscular re-education.    Lumbar MedX Initial testing   5/21/24   AROM (full=  0-72  lumbar) 0-36   Max Extension Torque  108   Flex: ext ratio (ideal 1.4:1) 2.35:1     Cervical MedX Initial testing  5/14/24   AROM (full= 0-120  cervical) 27-78   Max Extension Torque  129   Flex: ext ratio (ideal 1.4:1) 1.19:1     Date 7/2/24 6/25/24 5/28/24 5/21/24 5/14/24   Lumbar Parameters        Top Dead Center (TDC) 18 18 18 18    Counterbalance (CB) 200 200 200 200    Seat Pad 1 1 1 1    Femur Restraint 6 6 6 6    Week/Visit        Enter Week/Visit # Wk 3 V 1 Wk 2 v2 Wk 2 V 1 Wk 1 V 2 Wk 1 V 1   Weight (lbs) 63# 60 57# 54#    Reps (#) 24 16 17 15    Time 141s 78 116s 128s    ROM (degrees) 0-36 0-36 0-36 0-36    Pain   Pain from roller pressure No pain    Therapist cues        Cervical Parameters        Top Dead Center (TDC) 57 57 57 57 57   Counterbalance (CB) 1.0 1.0 1.0 1.0 1.0   Seat Height 453 453 453 453 453   Week/Visit        Enter Week/Visit # Wk 3 V 1 Wk 2 v 2 Wk 2 V1 Wk 1 V 2 Wk 1 V 1   Weight (lbs) 123# 114# 108# 99# --   Reps (#) 30 20 20 13 --   Time 153s 101 s 142s 99    ROM (degrees) 18-90 18-90 18-90 27-84 27-78   Pain No pain  No pain \"Tense\"    Therapist cues          Date 7/2/24 6/25/24 5/28/24 5/21/24 5/14/24   Exercise        treadmill 4 min X 4 min 4 min 4 min warm up    Rotary Torso,   90 seconds B 24# to L 24# to R, hold weight next time 22# to L 20# to R    Chin tuck and scapular retraction     Hold 10 seconds x 10 reps   Tennis or lacrosse ball for TP release in rhomboid     today   Stretches: upper trapezius, levator scapulae, scalene, rhomboid   Hold 30 seconds X 1-3 reps, B     Stretches: thread the needle, single knee to chest, quadratus lumborum, piriformis above 90   " Hold 30 seconds x 1-3 reps     4-way neck  SH-2  Sidebending X15  28#, B X 10 with 20#, could go up                                                        Modifications instructed by therapist: cued to count reps

## 2024-07-03 ENCOUNTER — TRANSFERRED RECORDS (OUTPATIENT)
Dept: HEALTH INFORMATION MANAGEMENT | Facility: CLINIC | Age: 49
End: 2024-07-03

## 2024-07-08 ENCOUNTER — THERAPY VISIT (OUTPATIENT)
Dept: PHYSICAL THERAPY | Facility: CLINIC | Age: 49
End: 2024-07-08
Payer: COMMERCIAL

## 2024-07-08 DIAGNOSIS — M54.2 CHRONIC NECK PAIN: ICD-10-CM

## 2024-07-08 DIAGNOSIS — G89.29 CHRONIC MIDLINE LOW BACK PAIN WITHOUT SCIATICA: Primary | ICD-10-CM

## 2024-07-08 DIAGNOSIS — G89.29 CHRONIC NECK PAIN: ICD-10-CM

## 2024-07-08 DIAGNOSIS — M54.50 CHRONIC MIDLINE LOW BACK PAIN WITHOUT SCIATICA: Primary | ICD-10-CM

## 2024-07-08 PROCEDURE — 97110 THERAPEUTIC EXERCISES: CPT | Mod: GP

## 2024-07-08 NOTE — PROGRESS NOTES
"  All weight progressions in therapy sessions are dictated by the patient tolerance and therapist discretion. Testing on MedX equipment is completed on 4-week intervals to allow for physiological change in muscle structure and neuromuscular re-education.    Lumbar MedX Initial testing   5/21/24   AROM (full=  0-72  lumbar) 0-36   Max Extension Torque  108   Flex: ext ratio (ideal 1.4:1) 2.35:1     Cervical MedX Initial testing  5/14/24   AROM (full= 0-120  cervical) 27-78   Max Extension Torque  129   Flex: ext ratio (ideal 1.4:1) 1.19:1     Date 7/8/2024 7/2/24 6/25/24 5/28/24 5/21/24 5/14/24   Lumbar Parameters         Top Dead Center (TDC) 18 18 18 18 18    Counterbalance (CB) 200 200 200 200 200    Seat Pad 1 1 1 1 1    Femur Restraint 6 6 6 6 6    Week/Visit         Enter Week/Visit # Wk 3 V2 Wk 3 V 1 Wk 2 v2 Wk 2 V 1 Wk 1 V 2 Wk 1 V 1   Weight (lbs) 70# 63# 60 57# 54#    Reps (#) 17 24 16 17 15    Time 117 141s 78 116s 128s    ROM (degrees) 0-48 0-36 0-36 0-36 0-36    Pain    Pain from roller pressure No pain    Therapist cues         Cervical Parameters         Top Dead Center (TDC) 57 57 57 57 57 57   Counterbalance (CB) 1.0 1.0 1.0 1.0 1.0 1.0   Seat Height 453 453 453 453 453 453   Week/Visit         Enter Week/Visit # Wk 3 V2 Wk 3 V 1 Wk 2 v 2 Wk 2 V1 Wk 1 V 2 Wk 1 V 1   Weight (lbs) 132# 123# 114# 108# 99# --   Reps (#) 26 30 20 20 13 --   Time 157s 153s 101 s 142s 99    ROM (degrees) 18-96 18-90 18-90 18-90 27-84 27-78   Pain  No pain  No pain \"Tense\"    Therapist cues           Date 7/8/2024 7/2/24 6/25/24 5/28/24 5/21/24 5/14/24   Exercise         treadmill 4 min  4 min X 4 min 4 min 4 min warm up    Rotary Torso,   90 seconds B 26# to R 24# to L 24# to R, hold weight next time 22# to L 20# to R    Chin tuck and scapular retraction Verbal review     Hold 10 seconds x 10 reps   Tennis or lacrosse ball for TP release in rhomboid Verbal Review     today   Stretches: upper trapezius, levator scapulae, " scalene, rhomboid    Hold 30 seconds X 1-3 reps, B     Stretches: thread the needle, single knee to chest, quadratus lumborum, piriformis above 90    Hold 30 seconds x 1-3 reps     4-way neck  SH-2  Sidebending X 15, 30# B X15  28#, B X 10 with 20#, could go up                                                              Modifications instructed by therapist: cued to count reps

## 2024-07-10 ENCOUNTER — THERAPY VISIT (OUTPATIENT)
Dept: PHYSICAL THERAPY | Facility: CLINIC | Age: 49
End: 2024-07-10
Payer: COMMERCIAL

## 2024-07-10 ENCOUNTER — DOCUMENTATION ONLY (OUTPATIENT)
Dept: SLEEP MEDICINE | Facility: CLINIC | Age: 49
End: 2024-07-10
Payer: COMMERCIAL

## 2024-07-10 DIAGNOSIS — N39.46 MIXED STRESS AND URGE URINARY INCONTINENCE: Primary | ICD-10-CM

## 2024-07-10 PROCEDURE — 97535 SELF CARE MNGMENT TRAINING: CPT | Mod: GP | Performed by: PHYSICAL THERAPIST

## 2024-07-10 PROCEDURE — 97110 THERAPEUTIC EXERCISES: CPT | Mod: GP | Performed by: PHYSICAL THERAPIST

## 2024-07-10 NOTE — PROGRESS NOTES
14  DAY STM VISIT    Diagnostic AHI:  HST: 16        MESSAGE LEFT FOR PT TO RETURN CALL    Assessment: Pt meeting objective benchmarks.      Action plan: waiting for patient to return call.         PAP settings:  CPAP MIN CPAP MAX 95TH % PRESSURE EPR RESMED SOFT RESPONSE SETTING   5.0 cm  H20 15.0 cm  H20 9.8 cm  H20  TWO OFF     Objective measures: 14 day rolling measures   COMPLIANCE LEAK AHI AVERAGE USE IN MINUTES   78 % 11.91 3.66 341   GOAL >70% GOAL < 24 LPM GOAL <5 GOAL >240      Patient has the following upcoming sleep appts:      Total time spent on accessing and interpreting remote patient PAP therapy data  10 minutes    Total time spent counseling, coaching  and reviewing PAP therapy data with patient  1 minute    [id number]  [id number]  no (3 day STM)

## 2024-07-23 ENCOUNTER — THERAPY VISIT (OUTPATIENT)
Dept: PHYSICAL THERAPY | Facility: CLINIC | Age: 49
End: 2024-07-23
Payer: COMMERCIAL

## 2024-07-23 ENCOUNTER — MYC MEDICAL ADVICE (OUTPATIENT)
Dept: FAMILY MEDICINE | Facility: CLINIC | Age: 49
End: 2024-07-23

## 2024-07-23 DIAGNOSIS — M89.8X1 PAIN OF RIGHT SCAPULA: ICD-10-CM

## 2024-07-23 DIAGNOSIS — M51.369 BULGING LUMBAR DISC: ICD-10-CM

## 2024-07-23 DIAGNOSIS — R20.0 NUMBNESS AND TINGLING OF RIGHT LEG: ICD-10-CM

## 2024-07-23 DIAGNOSIS — G89.29 CHRONIC MIDLINE LOW BACK PAIN WITHOUT SCIATICA: Primary | ICD-10-CM

## 2024-07-23 DIAGNOSIS — R20.2 NUMBNESS AND TINGLING OF RIGHT LEG: ICD-10-CM

## 2024-07-23 DIAGNOSIS — M54.2 CHRONIC NECK PAIN: ICD-10-CM

## 2024-07-23 DIAGNOSIS — G89.29 CHRONIC NECK PAIN: ICD-10-CM

## 2024-07-23 DIAGNOSIS — M54.50 CHRONIC MIDLINE LOW BACK PAIN WITHOUT SCIATICA: Primary | ICD-10-CM

## 2024-07-23 PROCEDURE — 97110 THERAPEUTIC EXERCISES: CPT | Mod: GP | Performed by: PHYSICAL THERAPIST

## 2024-07-23 NOTE — PROGRESS NOTES
"  All weight progressions in therapy sessions are dictated by the patient tolerance and therapist discretion. Testing on MedX equipment is completed on 4-week intervals to allow for physiological change in muscle structure and neuromuscular re-education.    Lumbar MedX Initial testing   5/21/24   AROM (full=  0-72  lumbar) 0-36   Max Extension Torque  108   Flex: ext ratio (ideal 1.4:1) 2.35:1     Cervical MedX Initial testing  5/14/24   AROM (full= 0-120  cervical) 27-78   Max Extension Torque  129   Flex: ext ratio (ideal 1.4:1) 1.19:1     Date 7/23/24 7/8/2024 7/2/24 6/25/24 5/28/24 5/21/24 5/14/24   Lumbar Parameters          Top Dead Center (TDC) 18 18 18 18 18 18    Counterbalance (CB) 200 200 200 200 200 200    Seat Pad 1 1 1 1 1 1    Femur Restraint 6 6 6 6 6 6    Week/Visit          Enter Week/Visit # Wk 4 V 1 Wk 3 V2 Wk 3 V 1 Wk 2 v2 Wk 2 V 1 Wk 1 V 2 Wk 1 V 1   Weight (lbs) 73# 70# 63# 60 57# 54#    Reps (#) 18 17 24 16 17 15    Time 121s 117 141s 78 116s 128s    ROM (degrees) 0-48 0-48 0-36 0-36 0-36 0-36    Pain fatigue    Pain from roller pressure No pain    Therapist cues          Cervical Parameters          Top Dead Center (TDC) 57 57 57 57 57 57 57   Counterbalance (CB) 1.0 1.0 1.0 1.0 1.0 1.0 1.0   Seat Height 453 453 453 453 453 453 453   Week/Visit          Enter Week/Visit # Wk 4 v 1 Wk 3 V2 Wk 3 V 1 Wk 2 v 2 Wk 2 V1 Wk 1 V 2 Wk 1 V 1   Weight (lbs) 141# 132# 123# 114# 108# 99# --   Reps (#) 21 26 30 20 20 13 --   Time 161s 157s 153s 101 s 142s 99    ROM (degrees) 27-99 18-96 18-90 18-90 18-90 27-84 27-78   Pain no pain, more fatigue  No pain  No pain \"Tense\"    Therapist cues            Date 7/23/24 7/8/2024  7/2/24 6/25/24 5/28/24 5/21/24 5/14/24   Exercise          treadmill 4 min 4 min  4 min X 4 min 4 min 4 min warm up    Rotary Torso,   90 seconds B 28# to L 26# to R 24# to L 24# to R, hold weight next time 22# to L 20# to R    Chin tuck and scapular retraction  Verbal review     Hold 10 " seconds x 10 reps   Tennis or lacrosse ball for TP release in rhomboid  Verbal Review     today   Stretches: upper trapezius, levator scapulae, scalene, rhomboid     Hold 30 seconds X 1-3 reps, B     Stretches: thread the needle, single knee to chest, quadratus lumborum, piriformis above 90 Thread the needle and QL,  hold 30 seconds x 1 B    Hold 30 seconds x 1-3 reps     4-way neck  SH-2  Sidebending 32# X 15, B X 15, 30# B X15  28#, B X 10 with 20#, could go up                                                                    Modifications instructed by therapist:

## 2024-07-25 NOTE — TELEPHONE ENCOUNTER
Should be seen on person for this.  It's a controlled substance and has some significant SE we should discuss before starting.     Please schedule in any provider approved spot.

## 2024-07-29 ENCOUNTER — OFFICE VISIT (OUTPATIENT)
Dept: FAMILY MEDICINE | Facility: CLINIC | Age: 49
End: 2024-07-29
Payer: COMMERCIAL

## 2024-07-29 ENCOUNTER — TELEPHONE (OUTPATIENT)
Dept: FAMILY MEDICINE | Facility: CLINIC | Age: 49
End: 2024-07-29

## 2024-07-29 VITALS
OXYGEN SATURATION: 98 % | SYSTOLIC BLOOD PRESSURE: 121 MMHG | TEMPERATURE: 98.2 F | HEIGHT: 66 IN | DIASTOLIC BLOOD PRESSURE: 83 MMHG | BODY MASS INDEX: 28.85 KG/M2 | WEIGHT: 179.5 LBS | HEART RATE: 64 BPM | RESPIRATION RATE: 10 BRPM

## 2024-07-29 DIAGNOSIS — K92.1 BLOOD IN STOOL: ICD-10-CM

## 2024-07-29 DIAGNOSIS — E66.3 OVERWEIGHT WITH BODY MASS INDEX (BMI) 25.0-29.9: ICD-10-CM

## 2024-07-29 DIAGNOSIS — D25.9 UTERINE LEIOMYOMA, UNSPECIFIED LOCATION: ICD-10-CM

## 2024-07-29 DIAGNOSIS — F41.1 GENERALIZED ANXIETY DISORDER: ICD-10-CM

## 2024-07-29 DIAGNOSIS — Z76.89 ENCOUNTER FOR WEIGHT MANAGEMENT: Primary | ICD-10-CM

## 2024-07-29 DIAGNOSIS — F32.1 MAJOR DEPRESSIVE DISORDER, SINGLE EPISODE, MODERATE (H): ICD-10-CM

## 2024-07-29 DIAGNOSIS — G43.001 MIGRAINE WITHOUT AURA AND WITH STATUS MIGRAINOSUS, NOT INTRACTABLE: ICD-10-CM

## 2024-07-29 PROBLEM — G43.009 MIGRAINE WITHOUT AURA: Status: ACTIVE | Noted: 2023-11-20

## 2024-07-29 PROBLEM — R59.1 LYMPHADENOPATHY: Status: RESOLVED | Noted: 2020-01-20 | Resolved: 2024-07-29

## 2024-07-29 PROBLEM — H93.12 TINNITUS, LEFT: Status: ACTIVE | Noted: 2023-11-20

## 2024-07-29 PROBLEM — R42 DIZZINESS: Status: ACTIVE | Noted: 2023-11-20

## 2024-07-29 PROBLEM — R87.610 ATYPICAL SQUAMOUS CELLS OF UNDETERMINED SIGNIFICANCE (ASCUS) ON PAPANICOLAOU SMEAR OF CERVIX: Status: ACTIVE | Noted: 2019-10-23

## 2024-07-29 PROCEDURE — 99214 OFFICE O/P EST MOD 30 MIN: CPT | Performed by: NURSE PRACTITIONER

## 2024-07-29 RX ORDER — PHENTERMINE HYDROCHLORIDE 15 MG/1
15 CAPSULE ORAL EVERY MORNING
Qty: 30 CAPSULE | Refills: 0 | Status: SHIPPED | OUTPATIENT
Start: 2024-07-29 | End: 2024-08-23

## 2024-07-29 RX ORDER — ATOGEPANT 60 MG/1
60 TABLET ORAL DAILY PRN
COMMUNITY

## 2024-07-29 RX ORDER — FLUTICASONE PROPIONATE 50 MCG
SPRAY, SUSPENSION (ML) NASAL
COMMUNITY
Start: 2024-06-06 | End: 2024-10-04

## 2024-07-29 ASSESSMENT — PATIENT HEALTH QUESTIONNAIRE - PHQ9
10. IF YOU CHECKED OFF ANY PROBLEMS, HOW DIFFICULT HAVE THESE PROBLEMS MADE IT FOR YOU TO DO YOUR WORK, TAKE CARE OF THINGS AT HOME, OR GET ALONG WITH OTHER PEOPLE: VERY DIFFICULT
SUM OF ALL RESPONSES TO PHQ QUESTIONS 1-9: 16
SUM OF ALL RESPONSES TO PHQ QUESTIONS 1-9: 16

## 2024-07-29 NOTE — PATIENT INSTRUCTIONS
GoodRx       As women get older, estrogen levels decrease, and as a result, we experience a change in body composition. Fat distribution begins to shift from the hips and thighs to the intra-abdominal cavity in the form of visceral - or belly - fat. This transition is also marked by rising inflammation levels linked directly to declining estrogen levels. The resulting inflammation greatly contributes to joint and muscular pain, brain fog, and this new belly fat deposition.    Fiber   Consume more than 25 grams of fiber per day. Fiber is a prebiotic, which means it nourishes the health-promoting microbes in your digestive tract, creating a healthy environment where microbes can thrive. By strengthening the good microbes in your body, fiber helps prevent harmful microbes from taking over and disrupting your body s natural systems.    Fiber helps you lose weight because it displaces other less satisfying calories. More fiber-rich foods mean lower insulin levels and slow, easy digestion. When fiber reaches the long term point inside your gut, between the small and large intestines, it slows down the digestive process and sends your body a signal that it s full. Some of the best sources of fiber-rich foods are berries, beans, whole grains, and fruits and vegetables with their skin on.    Fiber is a prebiotic - and promotes a healthy gut biome. Track your total fiber intake and ensure you are getting at least 25 mg (for women the goal is 25-35 mg/day) . Supplement if needed with a product that contains both soluble and insoluble fiber. This improves fasting blood sugars which improve insulin resistance. This also contributes to a healthy gut biome which through reduction in leaky gut syndrome, can improve leptin resistance and decrease cortisol levels. Fiber intake also stimulates CCK which is a potent appetite suppressant. 15-21    EAT PROTEIN  Although insulin levels rise initially after eating protein, the long-term  effects of eating protein throughout your eating window can lead to a decrease in belly fat and insulin resistance, while also improving leptin resistance and decreasing Ghrelin levels. Balance protein consumption throughout the day and aim for at least 20 - 25g with each meal. 7-9    EAT HEALTHY FATS  Omega-3 rich foods have been found to help lower fasting insulin and cortisol levels and stimulate the production of cholecystokinin, which is a natural appetite suppressant.  Omega-3 rich foods include fatty fish such as salmon, mackerel and sardines, seeds, especially flax, jonathan and hemp, walnuts, as well as grass fed proteins and omega 3 enriched eggs. Supplementing with a high quality omega-3 supplement is a great way to consume at least one high quality source of omega-3 each day.10-12    Reduce Added Sugar  Consume less than 25 grams of ADDED sugar daily. This DOES NOT include the natural sugars found in fruit, vegetables, and dairy but DOES include honey, agave, maple syrup, etc. (Note that added sugars can be found on the nutritional labels under  Carbohydrates ).    Reducing sugar consumption becomes easier as you focus on eating whole, unprocessed foods and swapping sodas and energy drinks for water or unsweetened iced tea or seltzers.    Review the labels of salad dressings, barbecue sauces, ketchup, farzana marinara sauces, and flavored yogurts, often containing large amounts of added sugars. Also, keep an eye out for products labeled  low fat  or  no fat,  as the removed fat has likely been replaced with sugar to improve the taste!    LIMIT PROCESSED FOODS  Processed food led to inflammation while increasing the risk of developing diseases like arthritis, asthma, cardiovascular disease, stroke, Alzheimer s disease, type 2 diabetes and more.  A diet with few processed foods is less likely to produce leptin resistance and more likely to control chronic inflammation.     PROBIOTICS  Your body, especially your  large intestine, contains trillions of microorganisms. The gut microbiota, a colonic bacterial population, plays a role in immunological health, digestion, and other bodily processes. Probiotics are good bacteria that are comparable to those found naturally in the body and are available through nutrition and/or supplementation.  Some foods high in probiotics are yogurt, kimchi, sauerkraut, kefir, tempeh, miso and pickled vegetables.  Incorporating probiotics to promote a healthy gut biome has been shown to lower cortisol levels.       Exercise  Complete 30-45 minutes of cardio in your fat-burning heart rate zone for at least 150 minutes weekly. Use the heart rate chart to find your fat-burning zone or Zone 2.     Use this reference chart to stay within your fat burning heart rate zone!        Zone 2 is low impact and requires a consistent pace at 60-70% of your maximum heart rate. If sustained for 30-45 minutes, it is more effective at fat loss than most other exercises because, at this pace of aerobic endurance, the body taps into fat as its fuel source.    If you have been completely sedentary in the past, then focus on walking for 5-10 minutes a day for the next four weeks. If you regularly participate in high-intensity workouts, use this challenge to try to do exercises that put you in the fat-burning zone.    Reduce Stress  Chronic stress contributes to inflammation through elevated cortisol levels and may cause increased blood pressure, headaches, gastric reflux, depression, and anxiety. Over the long term, chronic stress may affect your immune system, making you more susceptible to illness, infections, an increased risk for heart disease, and even some forms of cancer. Stress can also affect our relationships, work performance, a general sense of well-being, and quality of life.    Actively lower your cortisol through stress reduction techniques (ex: journaling, prayer, breathing, Jem Chi, restorative yoga,  meditation, sleep hygiene) and work to bring moments of johan, peace, and calm to your day by incorporating short walks, fresh air, journaling, using a meditation ryan, or sessions with a counselor or therapist. Doing one of these activities, even for 5 minutes throughout the day, counts and can go a long way in helping you manage your stress levels and improve your overall health.    No Alcohol   Refrain from alcohol consumption for the next 28 days. Drinking alcohol can trigger some menopausal symptoms, including hot flashes and night sweats, and it can disrupt your sleep in various ways. Alcohol can also create - or exasperate - mental health issues, including anxiety and depression, which can already be a struggle in midlife. Furthermore, moderate to high levels of alcohol consumption (from more than one drink per day) has been linked to the development of osteoporosis.      Sources:  Rolf WAN (2004). Update on adipocyte hormones: regulation of energy balance and carbohydrate/lipid metabolism. Diabetes, 53 Suppl 1, N319-G948. https://doi.org/10.2337/diabetes.53.2007.s143  Irma Rhoades M.M., EVE Root. & PETE Galindo. Chronic inflammatory diseases are stimulated by current lifestyle: how diet, stress levels and medication prevent our body from recovering. Nutr Metab (Lond) 9, 32 (2012). https://doi.org/10.1186/9067-2528-5-32  ELSI Jim,  DEEJAY Martinez,  EASTON Ricci, GI Salmeron,  Jose Roberto R, YENNIFER Yeboah,  DR. Tonya (2006) Dietary patterns are associated with biochemical markers of inflammation and endothelial activation in the Multi-Ethnic Study of Atherosclerosis (HINTON), The American Journal of Clinical Nutrition, 83(6), P 2405-2604, https://doi.org/10.1093/ajcn/83.6.1369  MIKE Carranza, MIKE Gallardo, MIKE Delaney, RIC Haddad, JABARI Durand, & CUAUHTEMOC Bhakta (2017). Sugar-Sweetened Beverages and Weight Gain in Children and Adults: A Systematic Review from 2013 to 2015 and a Comparison with Previous  Studies. Obesity facts, 10(6), 674-693. https://doi.org/10.1159/594903064  LORENA Urbano, CUAUHTEMOC Montano, MIKE Khan., SALOME Aguirre, AURY Bustos, ANSON Lance, YARELIS Jacome, & MIKE Adkins (2017). Changes in Sugar-Sweetened Soda Consumption, Weight, and Waist Circumference: 2-Year Cohort of [place] Women. American journal of public health, 107(11), 4224-4433. https://doi.org/10.2105/AJPH.2017.620565  CHING Keller, & SABRINA Mejias (2017). Sugars, Sweet Taste Receptors, and Brain Responses. Nutrients, 9(7), 653. https://doi.org/10.3390/bp7002031  CUAUHTEMOC Gonzalez., ANSON Richard., ZAC Schwartz, ANSON Young, & Keli RAdelaide KRIK. (2011). A review of weight control strategies and their effects on the regulation of hormonal balance. Journal of nutrition and metabolism, 2011, 195038. https://doi.org/10.1155/2011/971661  MIKE Perez., SOCO Ho, & LORIE Cohen (2009). Changes in muscle mass and strength after menopause. Journal of musculoskeletal & neuronal interactions, 9(4), 186-197.  TON Downey., SOCO Brooks., Chyna, JAdelaide ANGEL., van Caroline, HELEN BEAULIEU., BAYLEE Erickson., Kole, JAdelaide A., SIDRA Najera., MIKE Chowdary., Maria R-Cristino A., Reginster, J. Y., & ESCEO Task Force (2014). The role of dietary protein and vitamin D in maintaining musculoskeletal health in postmenopausal women: a consensus statement from the  Society for Clinical and Economic Aspects of Osteoporosis and Osteoarthritis (ESCEO). Maturitas, 79(1), 122-132. https://doi.org/10.1016/j.maturitas.2014.07.005  CUAUHTEMOC Gonzalez., ANSON Richard., ZAC Schwartz, ANSON Young, & AURY Dickey. (2011). A review of weight control strategies and their effects on the regulation of hormonal balance. Journal of nutrition and metabolism, 2011, 871672. https://doi.org/10.1155/2011/995602  rIma Rhoades M.M., JOSUE Root & PETE Galindo. Chronic inflammatory diseases are stimulated by current lifestyle: how diet, stress levels and medication prevent our body from recovering. Nutr Metab (Lond) 9,  32 (2012). https://doi.org/10.1186/1850-7269-2-32  ANSON Singer, ANSON Ochoa, BRITTNEE Watson. et al. Long-term aerobic exercise and omega-3 supplementation modulate osteoporosis through inflammatory mechanisms in post-menopausal women: a randomized, repeated measures study. Nutr Metab (Lond) 8, 71 (2011). https://doi.org/10.1186/0004-0316-0-71  CUAUHTEMOC Gonzalez, ANSON Richard, ZAC Schwartz, ANSON Young, & AURY Dickey (2011). A review of weight control strategies and their effects on the regulation of hormonal balance. Journal of nutrition and metabolism, 2011, 568475. https://doi.org/10.1155/2011/990040  ANSON Singer, ANSON Ochoa, BRITTNEE Watson. et al. Long-term aerobic exercise and omega-3 supplementation modulate osteoporosis through inflammatory mechanisms in post-menopausal women: a randomized, repeated measures study. Nutr Metab (Lond) 8, 71 (2011). https://doi.org/10.1186/6829-5002-0-71  [place] CARTAGENA (1999). The role of leptin in human obesity and disease: a review of current evidence. Annals of internal medicine, 130(8), 671-680. https://doi.org/10.7326/1578-7460-853-9-877418228-06594  CHING Keller, & SABRINA Mejias (2017). Sugars, Sweet Taste Receptors, and Brain Responses. Nutrients, 9(7), 653. https://doi.org/10.3390/xy3916307  JAKY Scales., Nuno, M. Y., Li, W. H., Urbina, S. Q., & Smiley, XAdelaide C. (2014). The impact of soluble dietary fibre on gastric emptying, postprandial blood glucose and insulin in patients with type 2 diabetes. [place] journal of clinical nutrition, 23(2), 210-218. https://doi.org/10.2633/apjcn.2014.23.2.01  Jesus Manuel GRIGGS (2006). Neuropeptide Y in normal eating and in genetic and dietary-induced obesity. Philosophical transactions of the Royal Society of [place]. Series B, Biological sciences, 361(0157), 9063-9298. https://doi.org/10.1098/rstb.2006.1855  SABRINA Tanner, & HELEN Blair (2004). Fat in the intestine as a regulator of appetite-role of CCK. Physiology & behavior, 83(4), 617-621.  https://doi.org/10.1016/j.physbeh.2004.07.031  GRISEL Nicolas., MIKE Brooke, & SABRINA Aguero (2005). Role of cholecystokinin in appetite control and body weight regulation. Obesity reviews : an official journal of the International Association for the Study of Obesity, 6(4), 297-306. https://doi.org/10.1111/j.1467-789X.2005.22298.x  AURY Landon., & Benita SAdelaide CAMILO. (2003). The gut hormone peptide YY regulates appetite. Annals of the [place] Academy of Sciences, 994, 162-168. https://doi.org/10.1111/j.3310-9446.2003.or24242.x  CHING Keller, & SABRINA Mejias (2017). Sugars, Sweet Taste Receptors, and Brain Responses. Nutrients, 9(7), 653. https://doi.org/10.[facility]0/si9430324  JAKY Scales., Nuno, MAdelaide Y., Li, W. H., Urbina, S. Q., & Smiley, X. C. (2014). The impact of soluble dietary fibre on gastric emptying, postprandial blood glucose and insulin in patients with type 2 diabetes. [place] journal of clinical nutrition, 23(2), 210-218. https://doi.org/10.6133/apjcn.2014.23.2.01  JAKY Maciel., Jeffrey, K. R., TON Tee., JAKY Herrera., & Garrett, T. S. (2018). Improving Diet Quality Is Associated with Decreased Inflammation: Findings from a  Intervention in Postmenopausal Women with Obesity. Journal of the Academy of Nutrition and Dietetics, 118(11), 4766-3307. https://doi.org/10.1016/j.jand.2018.05.014  SOCO Tariq, ANSON Dugan, & ANSON Melchor (2017). Ultra-processed Food Intake and Obesity: What Really Matters for Health-Processing or Nutrient Content?. Current obesity reports, 6(4), 420-431. https://doi.org/10.1007/v09200-148-6328-5  MIKE Millan, SABRINA Sierra, & SOCO Mattson (2012). Chronic inflammation and aging: DNA damage tips the balance. Current opinion in immunology, 24(4), 488-493. https://doi.org/10.1016/j.coi.2012.04.003  Salo FLOWER (2014). Fast food fever: reviewing the impacts of the Western diet on immunity. Nutrition journal, 13, 61. https://doi.org/10.1186/6360-9857-13-61  ELSI Jim,  DEEJAY Martinez,   EASTON Ricci, GI Salmeron,  TON iCd, YENNIFER Yeboah,  DR. Tonya (2006) Dietary patterns are associated with biochemical markers of inflammation and endothelial activation in the Multi-Ethnic Study of Atherosclerosis (HINTON), The American Journal of Clinical Nutrition, 83(6), P 6905-2924, https://doi.org/10.1093/ajcn/83.6.1369  DEEPAK Polanco., BAYLEE Mercado., LILIBETH Ramires., DEEPAK Mayfield., Roche, H., Meghna K., . . . ASHLEY Anderson. (2015). Low-grade inflammation, diet composition and health: Current research evidence and its translation. [place] Journal of Nutrition, 114(7), 999-1012. https://doi:10.1017/V4437962704630549  CUAUHTEMOC Gonzalez., ANSON Richard., ZAC Schwartz, ANSON Young, & AURY Dickey. (2011). A review of weight control strategies and their effects on the regulation of hormonal balance. Journal of nutrition and metabolism, 2011, 596247. https://doi.org/10.1155/2011/458650  Brain basics: Understanding sleep. (2023). National [place] of Neurological Disorders and Stroke, National [place] of Health. Retrieved from:https://www.ninds.nih.gov/health-information/public-education/brain-basics/brain-basics-understanding-sleep  SOCO Urbano., Noel, A. S., Kacie, C. DEEPAK., PETE Gonzaelz., PETE Aguilar., Jonathon, K. JEANNE., Emile, N. F., & Racqeul, Z. (2014). Short sleep duration is associated with decreased serum leptin, increased energy intake and decreased diet quality in postmenopausal women. Obesity ([place] 22(5), E55-E61. https://doi.org/10.1002/wilber.59512  ZAINAB Fraser, AURY Mathis, & ZAINAB Wilson (2018). Sleep, Health, and Metabolism in Midlife Women and Menopause: Food for Thought. Obstetrics and gynecology clinics of [place], 45(4), 679-690. https://doi.org/10.1016/j.ogc.2018.07.008

## 2024-07-29 NOTE — PROGRESS NOTES
"  Assessment & Plan     Encounter for weight management  Overweight with body mass index (BMI) 25.0-29.9  Discussed options for weight management pros and cons of phentermine as a stimulant medication  Discussed with patient if having any chest pain, palpitations to notify us right away  Discussed the recommendation to increase exercise, information on diet changes given in patient instructions -particularly around menopause  -Patient previously on CrossFit would highly recommend another high intensity low impact exercise  Continue with yoga patient's personal goal to do 3 times a week  Discussed that phentermine is a temporary medication and a controlled substance and likely should not be continued past 3 months  Patient to follow-up with me in 2 months  - phentermine 15 MG capsule  Dispense: 30 capsule; Refill: 0    Generalized anxiety disorder  Sleep study scheduled  Continue follow-up with psychiatry and therapy    Uterine leiomyoma, unspecified location  Bilateral oophorectomy  Not a candidate for estradiol therapy  Patient to get DEXA scan early    Major depressive disorder, single episode, moderate (H)  Sleep study scheduled  Continue follow-up with psychiatry and therapy    Migraine without aura    BMI  Estimated body mass index is 29.3 kg/m  as calculated from the following:    Height as of this encounter: 1.667 m (5' 5.63\").    Weight as of this encounter: 81.4 kg (179 lb 8 oz).   Weight management plan: Discussed healthy diet and exercise guidelines            Yvrose Nugent is a 48 year old, presenting for the following health issues:  Weight Loss (Discuss medication for weight loss)        7/29/2024     1:42 PM   Additional Questions   Roomed by NGOZI MARTIN     History of Present Illness       Reason for visit:  Weight loss discussion    She eats 0-1 servings of fruits and vegetables daily.She consumes 0 sweetened beverage(s) daily.She exercises with enough effort to increase her heart rate 30 to 60 " "minutes per day.  She exercises with enough effort to increase her heart rate 4 days per week.   She is taking medications regularly.     Here fore weight follow up - 9 pound weight gain in 2 months.   TSH normal 2/19/24, A1c 5.6%     Physical activity: no longer doing crossfit - stopped completely 2 months later.   Does Yoga -goal was 3 days/week and doing 1 day/week. and walking 5 days/week 1 mile     Diet: protein bar for breakfast and protein bar for lunch, dinner most days. Doesn't like eating. \"She eats to live.\"      Bars: 20g protein, 300 cals.    Crackers and cheese.     Surgical menopause due to cancer - for 4 years     Wt Readings from Last 5 Encounters:   07/29/24 81.4 kg (179 lb 8 oz)   05/23/24 77.1 kg (170 lb)   05/08/24 83.6 kg (184 lb 4.8 oz)   04/25/24 83.5 kg (184 lb)   04/25/24 83.9 kg (184 lb 14.4 oz)     Headaches: triggers: lack of sleep, stress  HA - nausea, photo and phono-sensitive  Helps: qulipta 60mg PRN (from allergist - has samples), closing eyes, laying down, take a nap, ibuprofen doesn't help.  Zofran helpful for nausea.  Emgality for 1 month - still HA while on this 1/2024    - never started topamax - from [facility]                     Objective    /83 (BP Location: Right arm, Patient Position: Sitting, Cuff Size: Adult Regular)   Pulse 64   Temp 98.2  F (36.8  C) (Oral)   Resp 10   Ht 1.667 m (5' 5.63\")   Wt 81.4 kg (179 lb 8 oz)   LMP  (LMP Unknown)   SpO2 98%   BMI 29.30 kg/m    Body mass index is 29.3 kg/m .  Physical Exam   GENERAL: alert and no distress  NECK: no adenopathy, no asymmetry, masses, or scars  RESP: lungs clear to auscultation - no rales, rhonchi or wheezes  CV: regular rate and rhythm, normal S1 S2, no S3 or S4, no murmur, click or rub, no peripheral edema            Signed Electronically by: ADITYA Crain CNP    "

## 2024-08-06 ENCOUNTER — THERAPY VISIT (OUTPATIENT)
Dept: PHYSICAL THERAPY | Facility: CLINIC | Age: 49
End: 2024-08-06
Payer: COMMERCIAL

## 2024-08-06 DIAGNOSIS — M54.50 CHRONIC MIDLINE LOW BACK PAIN WITHOUT SCIATICA: Primary | ICD-10-CM

## 2024-08-06 DIAGNOSIS — G89.29 CHRONIC NECK PAIN: ICD-10-CM

## 2024-08-06 DIAGNOSIS — M54.2 CHRONIC NECK PAIN: ICD-10-CM

## 2024-08-06 DIAGNOSIS — G89.29 CHRONIC MIDLINE LOW BACK PAIN WITHOUT SCIATICA: Primary | ICD-10-CM

## 2024-08-06 DIAGNOSIS — M51.369 BULGING LUMBAR DISC: ICD-10-CM

## 2024-08-06 PROCEDURE — 97110 THERAPEUTIC EXERCISES: CPT | Mod: GP | Performed by: PHYSICAL THERAPIST

## 2024-08-06 NOTE — PROGRESS NOTES
"  All weight progressions in therapy sessions are dictated by the patient tolerance and therapist discretion. Testing on MedX equipment is completed on 4-week intervals to allow for physiological change in muscle structure and neuromuscular re-education.    Lumbar MedX 4-week Re-test  8/6/24 Initial testing   5/21/24   AROM (full=  0-72  lumbar) 0-48 0-36   Max Extension Torque  174# 108#   Flex: ext ratio (ideal 1.4:1) 1.96:1 2.35:1     Cervical MedX  Initial testing  5/14/24   AROM (full= 0-120  cervical)  27-78   Max Extension Torque   129   Flex: ext ratio (ideal 1.4:1)  1.19:1     Date 8/6/24 7/23/24 7/8/2024 7/2/24 6/25/24 5/28/24 5/21/24 5/14/24   Lumbar Parameters           Top Dead Center (TDC) 18 18 18 18 18 18 18    Counterbalance (CB) 200 200 200 200 200 200 200    Seat Pad 1 1 1 1 1 1 1    Femur Restraint 6 6 6 6 6 6 6    Week/Visit           Enter Week/Visit # Wk 4 v 2 Wk 4 V 1 Wk 3 V2 Wk 3 V 1 Wk 2 v2 Wk 2 V 1 Wk 1 V 2 Wk 1 V 1   Weight (lbs) 74# 73# 70# 63# 60 57# 54#    Reps (#) 9 18 17 24 16 17 15    Time 59 121s 117 141s 78 116s 128s    ROM (degrees) 0-48 0-48 0-48 0-36 0-36 0-36 0-36    Pain  fatigue    Pain from roller pressure No pain    Therapist cues           Cervical Parameters           Top Dead Center (TDC) 57 57 57 57 57 57 57 57   Counterbalance (CB) 1 1.0 1.0 1.0 1.0 1.0 1.0 1.0   Seat Height 453 453 453 453 453 453 453 453   Week/Visit           Enter Week/Visit # Wk 4 v2 Wk 4 v 1 Wk 3 V2 Wk 3 V 1 Wk 2 v 2 Wk 2 V1 Wk 1 V 2 Wk 1 V 1   Weight (lbs) 147# 141# 132# 123# 114# 108# 99# --   Reps (#) 15 21 26 30 20 20 13 --   Time  161s 157s 153s 101 s 142s 99    ROM (degrees) 0-96 27-99 18-96 18-90 18-90 18-90 27-84 27-78   Pain  no pain, more fatigue  No pain  No pain \"Tense\"    Therapist cues             Date 8/6/24 7/23/24 7/8/2024 7/2/24 6/25/24 5/28/24 5/21/24 5/14/24   Exercise           treadmill X 4 min 4 min 4 min  4 min X 4 min 4 min 4 min warm up    Rotary Torso,   90 seconds B " 28# to R 28# to L 26# to R 24# to L 24# to R, hold weight next time 22# to L 20# to R    Chin tuck and scapular retraction   Verbal review     Hold 10 seconds x 10 reps   Tennis or lacrosse ball for TP release in rhomboid   Verbal Review     today   Stretches: upper trapezius, levator scapulae, scalene, rhomboid      Hold 30 seconds X 1-3 reps, B     Stretches: thread the needle, single knee to chest, quadratus lumborum, piriformis above 90  Thread the needle and QL,  hold 30 seconds x 1 B    Hold 30 seconds x 1-3 reps     4-way neck  SH-2  Sidebending  32# X 15, B X 15, 30# B X15  28#, B X 10 with 20#, could go up                                                                          Modifications instructed by therapist:     Raffi arrives for 8th MedX treatment. She is demonstrating significant improvement in her overall isometric torque on the LB MEDX. We will re-test the cervical MEDX next session, as time was limited this session. She is noting not as much improvement in her LB when compared to the neck and we discussed about adding manual therapy to the next treatment session. She will continue to benefit from skilled MedX physical therapy to work on pain levels and increase overall functional abilities.

## 2024-08-12 ENCOUNTER — THERAPY VISIT (OUTPATIENT)
Dept: PHYSICAL THERAPY | Facility: CLINIC | Age: 49
End: 2024-08-12
Payer: COMMERCIAL

## 2024-08-12 DIAGNOSIS — M54.50 CHRONIC MIDLINE LOW BACK PAIN WITHOUT SCIATICA: Primary | ICD-10-CM

## 2024-08-12 DIAGNOSIS — G89.29 CHRONIC NECK PAIN: ICD-10-CM

## 2024-08-12 DIAGNOSIS — M54.2 CHRONIC NECK PAIN: ICD-10-CM

## 2024-08-12 DIAGNOSIS — G89.29 CHRONIC MIDLINE LOW BACK PAIN WITHOUT SCIATICA: Primary | ICD-10-CM

## 2024-08-12 PROCEDURE — 97140 MANUAL THERAPY 1/> REGIONS: CPT | Mod: GP

## 2024-08-12 PROCEDURE — 97110 THERAPEUTIC EXERCISES: CPT | Mod: GP

## 2024-08-12 NOTE — PROGRESS NOTES
"  All weight progressions in therapy sessions are dictated by the patient tolerance and therapist discretion. Testing on MedX equipment is completed on 4-week intervals to allow for physiological change in muscle structure and neuromuscular re-education.    Lumbar MedX 4-week Re-test  8/6/24 Initial testing   5/21/24   AROM (full=  0-72  lumbar) 0-48 0-36   Max Extension Torque  174# 108#   Flex: ext ratio (ideal 1.4:1) 1.96:1 2.35:1     Cervical MedX Retest 8/12/2024 Initial testing  5/14/24   AROM (full= 0-120  cervical) 0-99 27-78   Max Extension Torque  268 129   Flex: ext ratio (ideal 1.4:1) 2.10:1 1.19:1     Date 8/12/2024 8/6/24 7/23/24 7/8/2024 7/2/24 6/25/24 5/28/24 5/21/24 5/14/24   Lumbar Parameters            Top Dead Center (TDC) 18 18 18 18 18 18 18 18    Counterbalance (CB) 200 200 200 200 200 200 200 200    Seat Pad 1 1 1 1 1 1 1 1    Femur Restraint 6 6 6 6 6 6 6 6    Week/Visit            Enter Week/Visit # 5/1 Wk 4 v 2 Wk 4 V 1 Wk 3 V2 Wk 3 V 1 Wk 2 v2 Wk 2 V 1 Wk 1 V 2 Wk 1 V 1   Weight (lbs) 80 74# 73# 70# 63# 60 57# 54#    Reps (#) 15 9 18 17 24 16 17 15    Time 98 59 121s 117 141s 78 116s 128s    ROM (degrees) 0-54 0-48 0-48 0-48 0-36 0-36 0-36 0-36    Pain   fatigue    Pain from roller pressure No pain    Therapist cues            Cervical Parameters            Top Dead Center (TDC) 57 57 57 57 57 57 57 57 57   Counterbalance (CB) 1 1 1.0 1.0 1.0 1.0 1.0 1.0 1.0   Seat Height 453 453 453 453 453 453 453 453 453   Week/Visit            Enter Week/Visit # 5/1 Wk 4 v2 Wk 4 v 1 Wk 3 V2 Wk 3 V 1 Wk 2 v 2 Wk 2 V1 Wk 1 V 2 Wk 1 V 1   Weight (lbs) 156# 147# 141# 132# 123# 114# 108# 99# --   Reps (#) 15 15 21 26 30 20 20 13 --   Time 83  161s 157s 153s 101 s 142s 99    ROM (degrees) 0-99 0-96 27-99 18-96 18-90 18-90 18-90 27-84 27-78   Pain No pain  no pain, more fatigue  No pain  No pain \"Tense\"    Therapist cues              Date 8/12/2024 8/6/24 7/23/24 7/8/2024 7/2/24 6/25/24 5/28/24 5/21/24 " 5/14/24   Exercise            treadmill 4 min  X 4 min 4 min 4 min  4 min X 4 min 4 min 4 min warm up    Rotary Torso,   90 seconds B  28# to R 28# to L 26# to R 24# to L 24# to R, hold weight next time 22# to L 20# to R    Chin tuck and scapular retraction    Verbal review     Hold 10 seconds x 10 reps   Tennis or lacrosse ball for TP release in rhomboid    Verbal Review     today   Stretches: upper trapezius, levator scapulae, scalene, rhomboid       Hold 30 seconds X 1-3 reps, B     Stretches: thread the needle, single knee to chest, quadratus lumborum, piriformis above 90   Thread the needle and QL,  hold 30 seconds x 1 B    Hold 30 seconds x 1-3 reps     4-way neck  SH-2  Sidebending   32# X 15, B X 15, 30# B X15  28#, B X 10 with 20#, could go up      Manual Targeting QL and glute med on R                                                                         Modifications instructed by therapist:      normal...

## 2024-08-13 ENCOUNTER — VIRTUAL VISIT (OUTPATIENT)
Dept: UROLOGY | Facility: CLINIC | Age: 49
End: 2024-08-13
Payer: COMMERCIAL

## 2024-08-13 VITALS — WEIGHT: 170 LBS | BODY MASS INDEX: 28.32 KG/M2 | HEIGHT: 65 IN

## 2024-08-13 DIAGNOSIS — M62.89 HIGH-TONE PELVIC FLOOR DYSFUNCTION: ICD-10-CM

## 2024-08-13 DIAGNOSIS — N32.81 OAB (OVERACTIVE BLADDER): ICD-10-CM

## 2024-08-13 DIAGNOSIS — N39.46 MIXED STRESS AND URGE URINARY INCONTINENCE: Primary | ICD-10-CM

## 2024-08-13 DIAGNOSIS — Z90.710 HISTORY OF HYSTERECTOMY FOR CANCER: ICD-10-CM

## 2024-08-13 PROCEDURE — 99214 OFFICE O/P EST MOD 30 MIN: CPT | Mod: 95 | Performed by: UROLOGY

## 2024-08-13 RX ORDER — ACETAMINOPHEN 325 MG/1
975 TABLET ORAL ONCE
OUTPATIENT
Start: 2024-08-13 | End: 2024-08-13

## 2024-08-13 ASSESSMENT — PAIN SCALES - GENERAL: PAINLEVEL: NO PAIN (0)

## 2024-08-13 NOTE — LETTER
8/13/2024       RE: Orquidea Arevalo  [address] [address]     Dear Colleague,    Thank you for referring your patient, Orquidea Arvealo, to the [facility] at [facility]. Please see a copy of my visit note below.    Virtual Visit Details    Type of service:  Video Visit     Originating Location (pt. Location): Home    Distant Location (provider location):  On-site  Platform used for Video Visit: Himanshu    August 13, 2024    Raffi was seen today for recheck.    Diagnoses and all orders for this visit:    Mixed stress and urge urinary incontinence  -     Case Request: RETROPUBIC MIDURETHRAL SLING, CYSTOSCOPY; Standing  -     Case Request: RETROPUBIC MIDURETHRAL SLING, CYSTOSCOPY    High-tone pelvic floor dysfunction  -     Case Request: RETROPUBIC MIDURETHRAL SLING, CYSTOSCOPY; Standing  -     Case Request: RETROPUBIC MIDURETHRAL SLING, CYSTOSCOPY    OAB (overactive bladder)  -     Case Request: RETROPUBIC MIDURETHRAL SLING, CYSTOSCOPY; Standing  -     Case Request: RETROPUBIC MIDURETHRAL SLING, CYSTOSCOPY    History of hysterectomy for cancer  -     Case Request: RETROPUBIC MIDURETHRAL SLING, CYSTOSCOPY; Standing  -     Case Request: RETROPUBIC MIDURETHRAL SLING, CYSTOSCOPY    Other orders  -     Void on call to OR; Standing  -     Skin  Incision-free  Procedures:  NO Nasal Decolonization or Skin Antisepsis Interventions Needed; Standing  -     Remove Hair; Standing  -     Forced Air Warming Device; Standing  -     Nozin Nasal  Popswab; Standing  -     Skin Antisepsis - DAY OF PROCEDURE; Standing  -     Skin Antisepsis - CURRENT INPATIENT; Standing  -     ACUTE Indwelling urinary catheter (Alonzo); Standing  -     Straight Catheterization; Standing  -     Discontinue indwelling urinary catheter (Alonzo); Standing  -     Notify Provider - Anticoagulants and Antiplatelets; Standing  -     Glucose monitor nursing POCT;  Standing  -     NPO per Anesthesia Guidelines for Procedure/Surgery Except for: Meds; Standing  -     Apply Pneumatic Compression Device (PCD); Standing  -     Pneumatic Compression Device (PCD) (Equipment); Standing  -     ceFAZolin (ANCEF) 2 g in sodium chloride 0.9 % 100 mL intermittent infusion  -     ceFAZolin (ANCEF) 2 g in sodium chloride 0.9 % 100 mL intermittent infusion  -     acetaminophen (TYLENOL) tablet 975 mg  -     acetaminophen (TYLENOL) Suppository 650 mg       Mixed incontinence stress predominant, is looking for a minimally invasive durable treatment and has elected to proceed with a retropubic midurethral sling.      We discussed that the risks to the procedure include but not limited to cardiovascular risks of anesthesia, nerve injury, bleeding, infection, injury to adjacent organs including bowel, bladder, and blood vessels, de dwayne or persistent stress incontinence, worsening urge incontinence, need for post-operative belle catheter, need for returning to pelvic floor therapy, need for further procedures including for mesh extrusion or erosion.  Patient expressed understanding and agreeable to proceed.  Will plan to proceed later fall    Did discuss her prior oncologic surgery may have created some scarring in her pelvis and that I would want to make sure she saw them prior to surgery to ensure she continues to be doing well from the oncologic standpoint    Discussed need for preoperative H&P, UA/UCX 7-10 days prior, and postoperative restrictions of pelvic rest and no heavy lifting    15 minutes were spent today on the day of the encounter in reviewing the EMR, direct patient care including surgical counseling, coordination of care and documentation    Basia Tavares MD MPH  (she/her/hers)   of Urology  [facility]    Subjective    Here today for follow up.  Has done some PT sessions that have helped.  Ran out of the mirabegron but no change in symptoms  She  "denies any changes in health since last visit.  She is due next to see gyn onc in September.  She is here today to discuss sling surgery for her stress incontinence      Ht 1.651 m (5' 5\")   Wt 77.1 kg (170 lb)   LMP  (LMP Unknown)   BMI 28.29 kg/m    GENERAL: healthy, alert and no distress  EYES: Eyes grossly normal to inspection, conjunctivae and sclerae normal  HENT: normal cephalic/atraumatic.  External ears, nose and mouth without ulcers or lesions.  RESP: no audible wheeze, cough, or visible cyanosis.  No visible retractions or increased work of breathing.  Able to speak fully in complete sentences.  NEURO: Cranial nerves grossly intact, mentation intact and speech normal  PSYCH: mentation appears normal, affect normal/bright, judgement and insight intact, normal speech and appearance well-groomed    CC  Patient Care Team:  Sheba Jane APRN CNP as PCP - General (Family Medicine)  Sheba Chowdary MD as MD (Gynecologic Oncology)  Sheba Chowdary MD as Assigned Cancer Care Provider  Katie Hurd AuD (Audiology)  Effie Hanson NP as Nurse Practitioner  Holly Moreno MD as Referring Physician (Family Medicine)  Gauri Tavares MD as MD (Urology)  Gauri Tavares MD as Assigned Surgical Provider  Mark Pelaez MD as MD (Cardiovascular Disease)  Mark Pelaez MD as Assigned Heart and Vascular Provider  Sheba Jane APRN CNP as Assigned PCP  Rozina Dobson MD as Assigned Neuroscience Provider  GAURI TAVARES        Again, thank you for allowing me to participate in the care of your patient.      Sincerely,    Gauri Tavares MD    "

## 2024-08-13 NOTE — PROGRESS NOTES
Virtual Visit Details    Type of service:  Video Visit     Originating Location (pt. Location): Home    Distant Location (provider location):  On-site  Platform used for Video Visit: Himanshu    August 13, 2024    Raffi was seen today for recheck.    Diagnoses and all orders for this visit:    Mixed stress and urge urinary incontinence  -     Case Request: RETROPUBIC MIDURETHRAL SLING, CYSTOSCOPY; Standing  -     Case Request: RETROPUBIC MIDURETHRAL SLING, CYSTOSCOPY    High-tone pelvic floor dysfunction  -     Case Request: RETROPUBIC MIDURETHRAL SLING, CYSTOSCOPY; Standing  -     Case Request: RETROPUBIC MIDURETHRAL SLING, CYSTOSCOPY    OAB (overactive bladder)  -     Case Request: RETROPUBIC MIDURETHRAL SLING, CYSTOSCOPY; Standing  -     Case Request: RETROPUBIC MIDURETHRAL SLING, CYSTOSCOPY    History of hysterectomy for cancer  -     Case Request: RETROPUBIC MIDURETHRAL SLING, CYSTOSCOPY; Standing  -     Case Request: RETROPUBIC MIDURETHRAL SLING, CYSTOSCOPY    Other orders  -     Void on call to OR; Standing  -     Skin  Incision-free  Procedures:  NO Nasal Decolonization or Skin Antisepsis Interventions Needed; Standing  -     Remove Hair; Standing  -     Forced Air Warming Device; Standing  -     Nozin Nasal  Popswab; Standing  -     Skin Antisepsis - DAY OF PROCEDURE; Standing  -     Skin Antisepsis - CURRENT INPATIENT; Standing  -     ACUTE Indwelling urinary catheter (Alonzo); Standing  -     Straight Catheterization; Standing  -     Discontinue indwelling urinary catheter (Alonzo); Standing  -     Notify Provider - Anticoagulants and Antiplatelets; Standing  -     Glucose monitor nursing POCT; Standing  -     NPO per Anesthesia Guidelines for Procedure/Surgery Except for: Meds; Standing  -     Apply Pneumatic Compression Device (PCD); Standing  -     Pneumatic Compression Device (PCD) (Equipment); Standing  -     ceFAZolin (ANCEF) 2 g in sodium chloride 0.9 % 100 mL intermittent infusion  -      "ceFAZolin (ANCEF) 2 g in sodium chloride 0.9 % 100 mL intermittent infusion  -     acetaminophen (TYLENOL) tablet 975 mg  -     acetaminophen (TYLENOL) Suppository 650 mg       Mixed incontinence stress predominant, is looking for a minimally invasive durable treatment and has elected to proceed with a retropubic midurethral sling.      We discussed that the risks to the procedure include but not limited to cardiovascular risks of anesthesia, nerve injury, bleeding, infection, injury to adjacent organs including bowel, bladder, and blood vessels, de dwayne or persistent stress incontinence, worsening urge incontinence, need for post-operative belle catheter, need for returning to pelvic floor therapy, need for further procedures including for mesh extrusion or erosion.  Patient expressed understanding and agreeable to proceed.  Will plan to proceed later fall    Did discuss her prior oncologic surgery may have created some scarring in her pelvis and that I would want to make sure she saw them prior to surgery to ensure she continues to be doing well from the oncologic standpoint    Discussed need for preoperative H&P, UA/UCX 7-10 days prior, and postoperative restrictions of pelvic rest and no heavy lifting    15 minutes were spent today on the day of the encounter in reviewing the EMR, direct patient care including surgical counseling, coordination of care and documentation    Basia Tavares MD MPH  (she/her/hers)   of Urology  [facility]    Subjective    Here today for follow up.  Has done some PT sessions that have helped.  Ran out of the mirabegron but no change in symptoms  She denies any changes in health since last visit.  She is due next to see gyn onc in September.  She is here today to discuss sling surgery for her stress incontinence      Ht 1.651 m (5' 5\")   Wt 77.1 kg (170 lb)   LMP  (LMP Unknown)   BMI 28.29 kg/m    GENERAL: healthy, alert and no distress  EYES: Eyes " grossly normal to inspection, conjunctivae and sclerae normal  HENT: normal cephalic/atraumatic.  External ears, nose and mouth without ulcers or lesions.  RESP: no audible wheeze, cough, or visible cyanosis.  No visible retractions or increased work of breathing.  Able to speak fully in complete sentences.  NEURO: Cranial nerves grossly intact, mentation intact and speech normal  PSYCH: mentation appears normal, affect normal/bright, judgement and insight intact, normal speech and appearance well-groomed    CC  Patient Care Team:  Sheba Jane APRN CNP as PCP - General (Family Medicine)  Sheba Chowdary MD as MD (Gynecologic Oncology)  Sheba Chowdary MD as Assigned Cancer Care Provider  Katie Hurd AuD (Audiology)  Effie Hanson NP as Nurse Practitioner  Holly Moreno MD as Referring Physician (Family Medicine)  Gauri Tavares MD as MD (Urology)  Gauri Tavares MD as Assigned Surgical Provider  Mark Pelaez MD as MD (Cardiovascular Disease)  Mark Pelaez MD as Assigned Heart and Vascular Provider  Sheba Jane APRN CNP as Assigned PCP  Rozina Dobson MD as Assigned Neuroscience Provider  GAURI TAVARES

## 2024-08-13 NOTE — NURSING NOTE
Current patient location: [address] [place] [id number]    Is the patient currently in the state of [place]? YES    Visit mode:VIDEO    If the visit is dropped, the patient can be reconnected by: VIDEO VISIT: Text to cell phone:   Telephone Information:   Mobile [phone]       Will anyone else be joining the visit? NO  (If patient encounters technical issues they should call [phone]    How would you like to obtain your AVS? MyChart    Are changes needed to the allergy or medication list? No    Are refills needed on medications prescribed by this physician? NO    Rooming Documentation:  Assigned questionnaire(s) completed      Reason for visit: RECHECK    Lynda CARRANZAF

## 2024-08-15 PROBLEM — N39.46 MIXED STRESS AND URGE URINARY INCONTINENCE: Status: RESOLVED | Noted: 2024-06-20 | Resolved: 2024-08-15

## 2024-08-15 NOTE — PROGRESS NOTES
DISCHARGE  Reason for Discharge: Patient has failed to schedule further appointments.    Equipment Issued: none    Discharge Plan: Patient to continue home program.    Referring Provider:  Basia Tavares       07/10/24 0500   Appointment Info   Signing clinician's name / credentials Dulce Maria Barrientos, PT, DPT   Total/Authorized Visits PT E&T   Visits Used 2   Medical Diagnosis mixed incontinence   PT Tx Diagnosis ALLY, constipation, dyspareunia   Quick Adds Pelvic Consent   Progress Note/Certification   Onset of illness/injury or Date of Surgery 04/25/24  (MD order)   Therapy Frequency once per week   Predicted Duration 10 weeks   Progress Note Completed Date 06/20/24   GOALS   PT Goals 3   PT Goal 1   Goal Identifier pad usage   Goal Description pt will reduce pad usage from 3 per day to 1 per day   Rationale to maximize safety and independence with performance of ADLs and functional tasks   Target Date 08/29/24   PT Goal 2   Goal Identifier dyspareunia   Goal Description pt will be able to have intercourse with pain <3/10   Rationale to maximize safety and independence with performance of ADLs and functional tasks   Target Date 08/29/24   PT Goal 3   Goal Identifier constipation   Goal Description pt will be able to have BMs 5+ times per week   Rationale to maximize safety and independence with performance of ADLs and functional tasks   Target Date 08/29/24   Subjective Report   Subjective Report Pt has run out of myrbetriq and now her urgency is worse again, espeically in the AM. Tried miralax but got runny and stopped. Pt reports no discomfort after last session.   Objective Measures   Objective Measures Objective Measure 1   Objective Measure 1   Details 3/5 strength, belly breathing, high tension pelvic floor   Treatment Interventions (PT)   Interventions Self Care/Home Management   Therapeutic Procedure/Exercise   Therapeutic Procedures: strength, endurance, ROM, flexibility minutes (96123) 15   PTRx  Ther Proc 1 Diaphragmatic Breathing   PTRx Ther Proc 1 - Details No Notes   PTRx Ther Proc 2 Abdominal Brace Transverse Abdominis   PTRx Ther Proc 2 - Details sidelying max cues for drawing in. Challenging for patient but achieves with practice x 20    Therapeutic Activity   PTRx Ther Act 1 Normal Bowel Habits   PTRx Ther Act 1 - Details No Notes   Self Care/home Management   ADL/Home Mgmt Training (15094) 25   Self Care Self Care 2   Self Care 5 Bowel Management   Self Care 5 - Details Patient educated on proper toileting position for bowel movements to improve pelvic floor relaxation, educated on use of toilet stool to keep feet elevated. Pt also educated on bowel program, the importance of routine, and how to begin training bowels to have more frequent emptying, handout provided. Finally, pt educated on the role of proper fluid and fiber intake and the relationship with stool consistency.   Self Care 6 Diaphragmatic Breathing   Self Care 6 - Details Patient educated on diaphragmatic breathing as well as 360 breathing- breathing also into posterior ribcage and downward into pelvic floor. Educated on slow breaths and calming nervous system in a variety of positions.   Self Care 2 Urge Supression   Self Care 2 - Details Patient educated on urge suppression techniques, educated on suppression of SNS/fight or flight response by deep breathing, distraction, and relaxation prior to voiding. Educated on importance of slow movements on the way to toilet, and reflex pathways involved with urgency.   Intervention (Other)   PTRx Other  1 Urge Incontinence Suppression Techniques   PTRx Other 1 - Details No Notes   Education   Learner/Method Patient   Education Comments Pt educated on PT role and plan of care   Plan   Home program see PTRx - printed   Plan for next session MFR if needed? stretching, advance abdominals, external releases with ball, full squat? get glutes going ?   Total Session Time   Timed Code Treatment  Minutes 40   Total Treatment Time (sum of timed and untimed services) 40

## 2024-08-19 ENCOUNTER — TELEPHONE (OUTPATIENT)
Dept: UROLOGY | Facility: CLINIC | Age: 49
End: 2024-08-19
Payer: COMMERCIAL

## 2024-08-19 NOTE — TELEPHONE ENCOUNTER
Left voicemail for patient regarding scheduling surgery with Dr. Tavares.  Provided contact number to discuss.  [phone]    Mirta Ernst, on 8/19/2024 at 5:25 PM

## 2024-08-22 ENCOUNTER — THERAPY VISIT (OUTPATIENT)
Dept: PHYSICAL THERAPY | Facility: CLINIC | Age: 49
End: 2024-08-22
Payer: COMMERCIAL

## 2024-08-22 DIAGNOSIS — G89.29 CHRONIC MIDLINE LOW BACK PAIN WITHOUT SCIATICA: Primary | ICD-10-CM

## 2024-08-22 DIAGNOSIS — G89.29 CHRONIC NECK PAIN: ICD-10-CM

## 2024-08-22 DIAGNOSIS — M54.2 CHRONIC NECK PAIN: ICD-10-CM

## 2024-08-22 DIAGNOSIS — M54.50 CHRONIC MIDLINE LOW BACK PAIN WITHOUT SCIATICA: Primary | ICD-10-CM

## 2024-08-22 PROCEDURE — 97110 THERAPEUTIC EXERCISES: CPT | Mod: GP

## 2024-08-22 PROCEDURE — 97140 MANUAL THERAPY 1/> REGIONS: CPT | Mod: GP

## 2024-08-22 NOTE — PROGRESS NOTES
"  All weight progressions in therapy sessions are dictated by the patient tolerance and therapist discretion. Testing on MedX equipment is completed on 4-week intervals to allow for physiological change in muscle structure and neuromuscular re-education.    Lumbar MedX 4-week Re-test  8/6/24 Initial testing   5/21/24   AROM (full=  0-72  lumbar) 0-48 0-36   Max Extension Torque  174# 108#   Flex: ext ratio (ideal 1.4:1) 1.96:1 2.35:1     Cervical MedX Retest 8/12/2024 Initial testing  5/14/24   AROM (full= 0-120  cervical) 0-99 27-78   Max Extension Torque  268 129   Flex: ext ratio (ideal 1.4:1) 2.10:1 1.19:1     Date 8/22/2024 8/12/2024 8/6/24 7/23/24 7/8/2024 7/2/24 6/25/24 5/28/24 5/21/24 5/14/24   Lumbar Parameters             Top Dead Center (TDC) 18 18 18 18 18 18 18 18 18    Counterbalance (CB) 200 200 200 200 200 200 200 200 200    Seat Pad 1 1 1 1 1 1 1 1 1    Femur Restraint 6 6 6 6 6 6 6 6 6    Week/Visit             Enter Week/Visit # 6/1 5/1 4/2 4/1 3/2 3/1 2/2 2/1 1/2 1/1   Weight (lbs) 83 80 74# 73# 70# 63# 60 57# 54#    Reps (#) 15 15 9 18 17 24 16 17 15    Time  98 59 121s 117 141s 78 116s 128s    ROM (degrees) 0-66 0-54 0-48 0-48 0-48 0-36 0-36 0-36 0-36    Pain    fatigue    Pain from roller pressure No pain    Therapist cues             Cervical Parameters             Top Dead Center (TDC) 57 57 57 57 57 57 57 57 57 57   Counterbalance (CB) 1 1 1 1.0 1.0 1.0 1.0 1.0 1.0 1.0   Seat Height 453 453 453 453 453 453 453 453 453 453   Week/Visit             Enter Week/Visit # 6/1 5/1 4/2 4/1 3/2 3/1 2/2 2/1 1/2 1/1   Weight (lbs) 162# 156# 147# 141# 132# 123# 114# 108# 99# --   Reps (#) 15 15 15 21 26 30 20 20 13 --   Time  83  161s 157s 153s 101 s 142s 99    ROM (degrees) 0-108 0-99 0-96 27-99 18-96 18-90 18-90 18-90 27-84 27-78   Pain  No pain  no pain, more fatigue  No pain  No pain \"Tense\"    Therapist cues               Date 8/22/2024 8/12/2024 8/6/24 7/23/24 7/8/2024 7/2/24 6/25/24 5/28/24 " 5/21/24 5/14/24   Exercise             treadmill 4 min  4 min  X 4 min 4 min 4 min  4 min X 4 min 4 min 4 min warm up    Rotary Torso,   90 seconds B   28# to R 28# to L 26# to R 24# to L 24# to R, hold weight next time 22# to L 20# to R    Chin tuck and scapular retraction     Verbal review     Hold 10 seconds x 10 reps   Tennis or lacrosse ball for TP release in rhomboid     Verbal Review     today   Stretches: upper trapezius, levator scapulae, scalene, rhomboid        Hold 30 seconds X 1-3 reps, B     Stretches: thread the needle, single knee to chest, quadratus lumborum, piriformis above 90    Thread the needle and QL,  hold 30 seconds x 1 B    Hold 30 seconds x 1-3 reps     4-way neck  SH-2  Sidebending    32# X 15, B X 15, 30# B X15  28#, B X 10 with 20#, could go up      Manual Targeting QL and glute med on B sides Targeting QL and glute med on R           Femoral nerve glide in prone X 15 - most sensitive on L side in hip flexor area            Glute med/ piriformis foam roller X 30 sec holds each side. L more tender than R.                                                      Modifications instructed by therapist:

## 2024-08-23 DIAGNOSIS — Z76.89 ENCOUNTER FOR WEIGHT MANAGEMENT: ICD-10-CM

## 2024-08-23 DIAGNOSIS — E66.3 OVERWEIGHT WITH BODY MASS INDEX (BMI) 25.0-29.9: ICD-10-CM

## 2024-08-23 RX ORDER — PHENTERMINE HYDROCHLORIDE 15 MG/1
15 CAPSULE ORAL EVERY MORNING
Qty: 30 CAPSULE | Refills: 0 | Status: SHIPPED | OUTPATIENT
Start: 2024-08-23 | End: 2024-09-23

## 2024-08-29 ENCOUNTER — THERAPY VISIT (OUTPATIENT)
Dept: PHYSICAL THERAPY | Facility: CLINIC | Age: 49
End: 2024-08-29
Payer: COMMERCIAL

## 2024-08-29 DIAGNOSIS — M54.2 CHRONIC NECK PAIN: ICD-10-CM

## 2024-08-29 DIAGNOSIS — G89.29 CHRONIC NECK PAIN: ICD-10-CM

## 2024-08-29 DIAGNOSIS — M54.50 CHRONIC MIDLINE LOW BACK PAIN WITHOUT SCIATICA: Primary | ICD-10-CM

## 2024-08-29 DIAGNOSIS — G89.29 CHRONIC MIDLINE LOW BACK PAIN WITHOUT SCIATICA: Primary | ICD-10-CM

## 2024-08-29 PROCEDURE — 97110 THERAPEUTIC EXERCISES: CPT | Mod: GP

## 2024-08-29 NOTE — PROGRESS NOTES
"  All weight progressions in therapy sessions are dictated by the patient tolerance and therapist discretion. Testing on MedX equipment is completed on 4-week intervals to allow for physiological change in muscle structure and neuromuscular re-education.    Lumbar MedX 4-week Re-test  8/6/24 Initial testing   5/21/24   AROM (full=  0-72  lumbar) 0-48 0-36   Max Extension Torque  174# 108#   Flex: ext ratio (ideal 1.4:1) 1.96:1 2.35:1     Cervical MedX Retest 8/12/2024 Initial testing  5/14/24   AROM (full= 0-120  cervical) 0-99 27-78   Max Extension Torque  268 129   Flex: ext ratio (ideal 1.4:1) 2.10:1 1.19:1     Date 8/29/2024 8/22/2024 8/12/2024 8/6/24 7/23/24 7/8/2024 7/2/24 6/25/24 5/28/24 5/21/24 5/14/24   Lumbar Parameters              Top Dead Center (TDC) 18 18 18 18 18 18 18 18 18 18    Counterbalance (CB) 200 200 200 200 200 200 200 200 200 200    Seat Pad 1 1 1 1 1 1 1 1 1 1    Femur Restraint 6 6 6 6 6 6 6 6 6 6    Week/Visit              Enter Week/Visit # 7/1 6/1 5/1 4/2 4/1 3/2 3/1 2/2 2/1 1/2 1/1   Weight (lbs) 88 83 80 74# 73# 70# 63# 60 57# 54#    Reps (#) 12 15 15 9 18 17 24 16 17 15    Time 98  98 59 121s 117 141s 78 116s 128s    ROM (degrees) 0-72 0-66 0-54 0-48 0-48 0-48 0-36 0-36 0-36 0-36    Pain     fatigue    Pain from roller pressure No pain    Therapist cues              Cervical Parameters              Top Dead Center (TDC) 57 57 57 57 57 57 57 57 57 57 57   Counterbalance (CB) 1 1 1 1 1.0 1.0 1.0 1.0 1.0 1.0 1.0   Seat Height 453 453 453 453 453 453 453 453 453 453 453   Week/Visit              Enter Week/Visit # 7/1 6/1 5/1 4/2 4/1 3/2 3/1 2/2 2/1 1/2 1/1   Weight (lbs) 162# 162# 156# 147# 141# 132# 123# 114# 108# 99# --   Reps (#) 18 15 15 15 21 26 30 20 20 13 --   Time 117  83  161s 157s 153s 101 s 142s 99    ROM (degrees) 0-114 0-108 0-99 0-96 27-99 18-96 18-90 18-90 18-90 27-84 27-78   Pain   No pain  no pain, more fatigue  No pain  No pain \"Tense\"    Therapist cues            "     Date 8/29/2024 8/22/2024 8/12/2024 8/6/24 7/23/24 7/8/2024 7/2/24 6/25/24 5/28/24 5/21/24 5/14/24   Exercise              treadmill 4 min 4 min  4 min  X 4 min 4 min 4 min  4 min X 4 min 4 min 4 min warm up    Rotary Torso,   90 seconds B 34# to L    28# to R 28# to L 26# to R 24# to L 24# to R, hold weight next time 22# to L 20# to R    Chin tuck and scapular retraction      Verbal review     Hold 10 seconds x 10 reps   Tennis or lacrosse ball for TP release in rhomboid      Verbal Review     today   Stretches: upper trapezius, levator scapulae, scalene, rhomboid         Hold 30 seconds X 1-3 reps, B     Stretches: thread the needle, single knee to chest, quadratus lumborum, piriformis above 90     Thread the needle and QL,  hold 30 seconds x 1 B    Hold 30 seconds x 1-3 reps     4-way neck  SH-2  Sidebending     32# X 15, B X 15, 30# B X15  28#, B X 10 with 20#, could go up      Manual  Targeting QL and glute med on B sides Targeting QL and glute med on R           Femoral nerve glide in prone  X 15 - most sensitive on L side in hip flexor area            Glute med/ piriformis foam roller  X 30 sec holds each side. L more tender than R.                                                         Modifications instructed by therapist:

## 2024-09-01 ENCOUNTER — TRANSFERRED RECORDS (OUTPATIENT)
Dept: HEALTH INFORMATION MANAGEMENT | Facility: CLINIC | Age: 49
End: 2024-09-01

## 2024-09-06 NOTE — TELEPHONE ENCOUNTER
Left voicemail for patient regarding scheduling surgery with Dr. Tavares.  Provided contact number to discuss.  [phone]    Mirta Ernst, on 9/6/2024 at 11:46 AM

## 2024-09-09 NOTE — PROGRESS NOTES
Outpatient Sleep Medicine Consultation:    Name: Orquidea Arevalo MRN# [id number]   Age: 48 year old YOB: 1975     Date of Consultation: September 10, 2024  Consultation is requested by: Breanna Bejarano MD  [address] [place] [address] Breanna Bejarano  Primary care provider: Sheba Jane       Reason for Sleep Consult:     Orquidea Arevalo is sent by Breanna Bejarano for a sleep consultation regarding RADHA.    Patient s Reason for visit  Orquidea Arevalo main reason for visit: Follow up since starting CPAP  Patient states problem(s) started: Sleep troubles have been ongoing for years but have been worse since late 2022  Orquidea Fosteron's goals for this visit: Tips for improving CPAP experience, understanding of my own apnea (central vs obstructive) and if any additional neurological assessment is required         Assessment and Plan:     Summary Sleep Diagnoses:  1. RADHA (obstructive sleep apnea)  Comorbid Diagnoses:  2. Generalized anxiety disorder  3. Major depressive disorder, single episode, moderate (H)    Patient presents to clinic today to establish care of her recently diagnosed moderate RADHA treated with CPAP therapy.  Patient had home sleep study completed in May after E-consult that showed overall moderate, though supine predominant RADHA with AHI 16, supine AHI 38, lateral AHI 11 and was started on CPAP therapy 6/25/2024.  Review of CPAP download shows sleep apnea is well treated at current auto CPAP 5-15 cm H2O with low residual AHI of 2.7 events per hour.  Patient uses virtually every night but averages only 4 hours of use per night, has no significant difficulties falling asleep with the mask on but will wake up about 4 hours into sleep with a very dry mouth to the point that it is uncomfortable and she takes the mask off and sleeps remainder of the night without it.  Patient has a nasal pillow mask and we discussed addition of chinstrap or mouth tape or will be eligible  for a new mask in a couple weeks and can consider something like a fullface mask if does not tolerate the chinstrap.  She will reach out to [facility] about getting this and giving it a try.  We also agreed to trial decreasing pressure settings a bit and will change to 5-10 cm H2O better tailor to her needs.  Agreed to follow-up in a few months to recheck progress with possible new mask and new pressure settings.  She will continue to work on using CPAP as much as she can but ideally for entirety of sleep duration for maximal benefits.  We discussed mask desensitization and I encouraged her to put the mask on right when she enters bed so that she can get used to it while she watches TV and relaxes that should also hopefully help her use for longer stretches at night.  Of note, today's visit was scheduled in a follow-up slot not new patient slot so insufficient time to discuss all of sleep questionnaire, answered yes to our RLS screening questions but not discussed today, plan to ask about this at next visit.  Lastly, patient had elevated PHQ-9 today and she will follow-up with her mental health provider.   - Comprehensive DME    Depression Screening Follow-up        9/10/2024     8:32 AM   PHQ   PHQ-9 Total Score 14   Q9: Thoughts of better off dead/self-harm past 2 weeks Not at all     Does the patient currently have a mental health provider?  Yes, patient was referred back to current mental health provider.    Beverly Antony PA-C             History of Present Illness:     Orquidea Arevalo is a 48 year old female with MDD, KINGSTON, migraine, recently diagnosed moderate RADHA on CPAP who presents to clinic today to establish care.    Patient had E-consult completed back in February 2024 completed by Dr. Darci Carlos where a home sleep test was ordered to screen for RADHA, predominant symptoms daytime sleepiness, chronic fatigue.    HST completed 5/21/2024 (170#, BMI 28.3) revealing AHI 16, supine AHI 38.4, left AHI 10.6,  "right side AHI 11.5.  Baseline oxygen 91% with 6.2 minutes spent less than or equal to 88% and beryl 83%.    Patient was set up with ResMed AirSense 11 auto CPAP 5-15 cm H2O on 6/25/2024.  No insurance requirements for compliance.    Presents to my clinic today to establish care and discuss progress on CPAP. States \"I have no trouble falling asleep with it on but it's when I wake up my mouth is dry and my lips are stuck to my teeth or I feel like I am choking or dying and have to take mask off and I don't put it back on\".  Patient has a nasal pillow mask.  Finds the mask comfortable and is not leaking but as above does have significant oral dryness, no nasal dryness or epistaxis.  Pressures feel comfortable overall.    ResMed Auto-PAP 5-15 cmH2O download (8/6/24-9/4/24):  29 total days of use. 1 nonuse days. 17 days with >4 hours use.  Average use 4 hours 11 minutes per day. Median Leak 0 L/min. 95%ile Leak 4.3 L/min. CPAP 95% pressure 9.8cm. AHI 2.7    SLEEP-WAKE SCHEDULE:     Work/School Days: Patient goes to school/work: No   Usually gets into bed at 8:30-9:00pm  Takes patient about 2 hours to fall asleep   Admits watching TV during this time until sleeping and \"I have anxiety and my mind starts racing\"  Has trouble falling asleep 6-7 nights per week  Wakes up in the middle of the night 1-2 times.  Wakes up due to Pain;External stimuli (bed partner, pets, noise, etc);Use the bathroom;Anxiety;Nightmares;Other  She has trouble falling back asleep 3 times a week.   It usually takes 20 minutes to get back to sleep  Patient is usually up at 6:30 a.m.  Uses alarm: No    Weekends/Non-work Days/All Other Days:  Usually gets into bed at 9-10 PM  Takes patient about 2 hrs to fall asleep  Patient is usually up at 7:00 a.m.  Uses alarm: No    Sleep Need  Patient estimates she gets  6-7 hrs sleep on average   Patient thinks she needs about 8 hrs sleep    Oqruidea Arevalo prefers to sleep in this position(s): Side   Patient " states they do the following activities in bed: Watch TV    Naps  Patient takes a purposeful nap Almost never and naps are usually Na in duration  She dozes off unintentionally 0 days per week  Patient has had a driving accident or near-miss due to sleepiness/drowsiness: No      SLEEP DISRUPTIONS:    Breathing/Snoring  Patient snores:No  Other people complain about her snoring: No  Patient has been told she stops breathing in her sleep:Yes  She has issues with the following: Morning mouth dryness;Stuffy nose when you wake up;Getting up to urinate more than once    Movement:  Patient gets pain, discomfort, with an urge to move:  Yes  It happens when she is resting:  Yes  It happens more at night:  Yes  Patient has been told she kicks her legs at night:  No     Behaviors in Sleep:  Orquidea Arevalo has experienced the following behaviors while sleeping: Recurring Nightmares  Denies sleepwalking, sleep talking, sleep eating, bruxism, dream enactment, night terrors, sleep paralysis, hypnagogic/hypnopompic hallucinations, cataplexy    CAFFEINE AND OTHER SUBSTANCES:    Patient consumes caffeinated beverages per day:  1-2  Last caffeine use is usually: Before noon  List of any prescribed or over the counter stimulants that patient takes: Phentermine - started in July 2024  List of any prescribed or over the counter sleep medication patient takes: Medical marijuana  List of previous sleep medications that patient has tried: Melatonin  Patient drinks alcohol to help them sleep: No  Patient drinks alcohol near bedtime: No    Family History:  Patient has a family member been diagnosed with a sleep disorder: No    SCALES:    EPWORTH SLEEPINESS SCALE         9/10/2024     7:45 AM    [place] Sleepiness Scale ( CLEM Cool  [id number]<br>ESS - [place]/English - Final version - 21 Nov 07 - [facility] [place].)   Sitting and reading Would never doze   Watching TV Slight chance of dozing   Sitting, inactive in a public place (e.g. a  theatre or a meeting) Would never doze   As a passenger in a car for an hour without a break Moderate chance of dozing   Lying down to rest in the afternoon when circumstances permit Slight chance of dozing   Sitting and talking to someone Would never doze   Sitting quietly after a lunch without alcohol Slight chance of dozing   In a car, while stopped for a few minutes in traffic Would never doze   [place] Score (MC) 5   [place] Score (Sleep) 5       INSOMNIA SEVERITY INDEX (PARUL)          9/10/2024     7:33 AM   Insomnia Severity Index (PARUL)   Difficulty falling asleep 3   Difficulty staying asleep 2   Problems waking up too early 2   How SATISFIED/DISSATISFIED are you with your CURRENT sleep pattern? 3   How NOTICEABLE to others do you think your sleep problem is in terms of impairing the quality of your life? 2   How WORRIED/DISTRESSED are you about your current sleep problem? 3   To what extent do you consider your sleep problem to INTERFERE with your daily functioning (e.g. daytime fatigue, mood, ability to function at work/daily chores, concentration, memory, mood, etc.) CURRENTLY? 3   PARUL Total Score 18       Guidelines for Scoring/Interpretation:  Total score categories:  0-7 = No clinically significant insomnia   8-14 = Subthreshold insomnia   15-21 = Clinical insomnia (moderate severity)  22-28 = Clinical insomnia (severe)  Used via courtesy of www.[employer].va.gov with permission from Todd Mas PhD., [facility]      GAD7        5/8/2024    10:52 AM   KINGSTON-7    1. Feeling nervous, anxious, or on edge 3   2. Not being able to stop or control worrying 3   3. Worrying too much about different things 3   4. Trouble relaxing 3   5. Being so restless that it is hard to sit still 1   6. Becoming easily annoyed or irritable 3   7. Feeling afraid, as if something awful might happen 3   KING[place]ON-7 Total Score 19   If you checked any problems, how difficult have they made it for you to do your work, take care  of things at home, or get along with other people? Extremely difficult     PATIENT HEALTH QUESTIONNAIRE-9 (PHQ - 9)        9/10/2024     8:32 AM   PHQ-9 ([employer])   1.  Little interest or pleasure in doing things 2   2.  Feeling down, depressed, or hopeless 1   3.  Trouble falling or staying asleep, or sleeping too much 3   4.  Feeling tired or having little energy 3   5.  Poor appetite or overeating 1   6.  Feeling bad about yourself - or that you are a failure or have let yourself or your family down 1   7.  Trouble concentrating on things, such as reading the newspaper or watching television 3   8.  Moving or speaking so slowly that other people could have noticed. Or the opposite - being so fidgety or restless that you have been moving around a lot more than usual 0   9.  Thoughts that you would be better off dead, or of hurting yourself in some way 0   PHQ-9 Total Score 14   If you checked off any problems, how difficult have these problems made it for you to do your work, take care of things at home, or get along with other people? Somewhat difficult   6.  Feeling bad about yourself 1   7.  Trouble concentrating 3   8.  Moving slowly or restless 0   9.  Suicidal or self-harm thoughts 0   Difficulty at work, home, or with people Somewhat difficult       Developed by Maxx Walker, Lulu Ryan, Camilo Iniguez and colleagues, with an educational prem from [employer] No permission required to reproduce, translate, display or distribute.    Allergies:    Allergies   Allergen Reactions    Ciprofloxacin Dizziness and Unknown     Dizziness       Medications:    Current Outpatient Medications   Medication Sig Dispense Refill    acetaminophen (TYLENOL) 500 MG tablet Take 500-1,000 mg by mouth every 6 hours as needed for mild pain      Atogepant (QULIPTA) 60 MG TABS Take 60 mg by mouth daily as needed Samples from ENT/allergy      buPROPion (WELLBUTRIN XL) 300 MG 24 hr tablet [facility]      cyclobenzaprine (FLEXERIL) 10 MG tablet Take 1 tablet (10 mg) by mouth nightly as needed for muscle spasms 30 tablet 3    fluticasone (FLONASE) 50 MCG/ACT nasal spray 2 sprays in each nostril daily x1 week then 1-2 sprays in each nostril daily. (use smallest dose possible for symptom control after week 1).      gabapentin (NEURONTIN) 100 MG capsule Take 2 capsules (200 mg) by mouth every morning AND 3 capsules (300 mg) at bedtime. If you experience side effects decrease to last tolerated dose and contact the clinic.  If not receiving desired effect contact the clinic. 150 capsule 3    ibuprofen (ADVIL/MOTRIN) 200 MG tablet Take 200 mg by mouth every 4 hours as needed for pain      LORazepam (ATIVAN) 0.5 MG tablet Take 0.5 mg by mouth as needed for anxiety [facility]      meclizine (ANTIVERT) 25 MG tablet Take 1 tablet (25 mg) by mouth 3 times daily as needed for dizziness 20 tablet 1    ondansetron (ZOFRAN) 4 MG tablet Take 1 tablet (4 mg) by mouth every 8 hours as needed for nausea or vomiting 30 tablet 4    phentermine 15 MG capsule Take 1 capsule by mouth in the morning 30 capsule 0    sertraline (ZOLOFT) 50 MG tablet Take 75 mg by mouth daily [facility] and [facility]         Problem List:  Patient Active Problem List    Diagnosis Date Noted    Chronic neck pain 08/12/2024     Priority: Medium    Overweight with body mass index (BMI) 25.0-29.9 07/29/2024     Priority: Medium     Phentermine order placed 7/29/2024 starting BMI 29.3 starting weight 179 pounds 8 ounces      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 Surveillance to include a pelvic and rectal exam with provider visit.  No pap smear indicated.   - tumor affected iliac vein on R side - s/p clips thought initially to be DVT       Elevated serum creatinine 02/19/2024     Priority: Medium    Chronic midline low back pain without sciatica 02/19/2024     Priority: Medium     Continue followed by chiropractor and Flexeril as needed.  Refilling today  - cyclobenzaprine 10mg      Fatigue, unspecified type 02/19/2024     Priority: Medium     Sleep study completed 5/20/2024 normal      History of breast augmentation 02/19/2024     Priority: Medium     Done in 2007 bilaterally.  The saline implants.  History of rupture and left side      Dizziness 11/20/2023     Priority: Medium    Migraine without aura 11/20/2023     Priority: Medium     7/26/24 - Headaches: triggers: lack of sleep, stress  HA - nausea, photo and phono-sensitive  Helps: qulipta 60mg PRN (from allergist - has samples), closing eyes, laying down, take a nap, ibuprofen doesn't help.  Zofran helpful for nausea.  Emgality for 1 month - still HA while on this 1/2024    - never started topamax - from [facility]       Tinnitus, left 11/20/2023     Priority: Medium    Functional urinary incontinence 10/26/2023     Priority: Medium    Major depressive disorder, single episode, moderate (H) 05/19/2023     Priority: Medium     Patient followed for all mental health conditions by psychiatrist.   [facility]      She feels she is stable on Zoloft 75 mg daily, Wellbutrin 150 mg extended release daily as well as Ativan as needed.      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 Surveillance to include a pelvic and rectal exam with provider visit.  No pap smear indicated.       No longer on letrozole due to side effects  Cannot be on Estradiol replacement        Atypical squamous cells of undetermined significance (ASCUS) on Papanicolaou smear of cervix 10/23/2019     Priority: Medium     Formatting of this note might be different from the original.   Formatting of this note might be different from the original. 02/04/2010 ASCUS/HPV Negative.   10/31/2012 ASCUS/HPV Negative   08/23/2019 ASCUS/HPV Negative   Plan: Pap/HPV due 8/2020 Formatting of this note might be different from the original. Formatting of this note might be different from the original. 02/04/2010 ASCUS/HPV Negative. 10/31/2012 ASCUS/HPV Negative 08/23/2019 ASCUS/HPV Negative Plan: Pap/HPV due 8/2020 Formatting of this note might be different from the original. 02/04/2010 ASCUS/HPV Negative. 10/31/2012 ASCUS/HPV Negative 08/23/2019 ASCUS/HPV Negative Plan: Pap/HPV due 8/2020 Formatting of this note might be different from the original. 02/04/2010 ASCUS/HPV Negative.   10/31/2012 ASCUS/HPV Negative   08/23/2019 ASCUS/HPV Negative   Plan: Pap/HPV due 8/2020 Formatting of this note might be different from the original. Formatting of this note might be different from the original. 02/04/2010 ASCUS/HPV Negative.   10/31/2012 ASCUS/HPV Negative   08/23/2019 ASCUS/HPV Negative   Plan: Pap/HPV due 8/2020      IBS (irritable bowel syndrome) 06/01/2015     Priority: Medium    Generalized anxiety disorder 08/05/2011     Priority: Medium     [facility] counseling and healing [facility]   Sertraline 75mg  And ativan PRN           Past Medical/Surgical History:  Past Medical History:   Diagnosis Date    Arthritis 2020    Joint pain in fingers, ankles, back, wrists, shoukders, etc    Atypical squamous cells of undetermined significance (ASCUS) on Papanicolaou smear of cervix     Formatting of  this note might be different from the original.  02/04/2010 ASCUS/HPV Negative.    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    Depressive disorder 5/2023    Major depressive elisode and anxiety    Hydronephrosis     Lymphadenopathy 01/20/2020     Past Surgical History:   Procedure Laterality Date    ABDOMEN SURGERY  2019 and 2029    Hysterectomy and tumour removal - BL oopherectomy    BREAST SURGERY  2007    Breast augmentation    COLONOSCOPY  2021    Polyps removed-repeat in 5 years    GENITOURINARY SURGERY  2001    Tibal ligation    HYSTERECTOMY VAGINAL, BILATERAL SALPINGO-OOPHERECTOMY, COMBINED      December 2019- and Feb 2020    HYSTERECTOMY, PAP NO LONGER INDICATED      at 43yo       Social History:  Social History     Socioeconomic History    Marital status:      Spouse name: Not on file    Number of children: Not on file    Years of education: Not on file    Highest education level: Not on file   Occupational History    Not on file   Tobacco Use    Smoking status: Never     Passive exposure: Past    Smokeless tobacco: Never   Substance and Sexual Activity    Alcohol use: Yes     Comment: Occasional 2x month    Drug use: Never    Sexual activity: Yes     Partners: Male     Birth control/protection: Post-menopausal   Other Topics Concern    Parent/sibling w/ CABG, MI or angioplasty before 65F 55M? No   Social History Narrative    Not on file     Social Determinants of Health     Financial Resource Strain: Low Risk  (2/19/2024)    Financial Resource Strain     Within the past 12 months, have you or your family members you live with been unable to get utilities (heat, electricity) when it was really needed?: No   Food Insecurity: Low Risk  (2/19/2024)    Food Insecurity     Within the past 12 months, did you worry that your food would run out before you got money to buy more?: No     Within the past 12  months, did the food you bought just not last and you didn t have money to get more?: No   Transportation Needs: Low Risk  (2/19/2024)    Transportation Needs     Within the past 12 months, has lack of transportation kept you from medical appointments, getting your medicines, non-medical meetings or appointments, work, or from getting things that you need?: No   Physical Activity: Sufficiently Active (2/19/2024)    Exercise Vital Sign     Days of Exercise per Week: 5 days     Minutes of Exercise per Session: 40 min   Stress: Stress Concern Present (2/19/2024)    [place] [employer] - Occupational Stress Questionnaire     Feeling of Stress : Very much   Social Connections: Unknown (2/19/2024)    Social Connection and Isolation Panel [NHANES]     Frequency of Communication with Friends and Family: Not on file     Frequency of Social Gatherings with Friends and Family: Once a week     Attends [employer] Services: Not on file     Active Member of Clubs or Organizations: Not on file     Attends Club or Organization Meetings: Not on file     Marital Status: Not on file   Interpersonal Safety: Low Risk  (2/19/2024)    Interpersonal Safety     Do you feel physically and emotionally safe where you currently live?: Yes     Within the past 12 months, have you been hit, slapped, kicked or otherwise physically hurt by someone?: No     Within the past 12 months, have you been humiliated or emotionally abused in other ways by your partner or ex-partner?: No   Housing Stability: Low Risk  (2/19/2024)    Housing Stability     Do you have housing? : Yes     Are you worried about losing your housing?: No       Family History:  Family History   Problem Relation Age of Onset    Hypertension Mother     Depression Mother     Obesity Mother     Heart Surgery Mother 70        PPM placement  - [place] in the 40s and then 130s?    Atrial fibrillation Mother     Hypertension Father     Other Cancer Father         Basal cell  "on face and neck    Substance Abuse Father     Mental Illness Brother         Boderline personality disorder    Substance Abuse Brother     Obesity Brother     Anxiety Disorder Maternal Grandmother     Diabetes Maternal Grandfather     Cancer Paternal Grandfather         ?       Review of Systems:  In the last TWO WEEKS have you experienced any of the following symptoms?  Fevers: No  Night Sweats: Yes  Weight Gain: No  Pain at Night: Yes  Double Vision: No  Changes in Vision: Yes  Difficulty Breathing through Nose: Yes  Sore Throat in Morning: Yes  Dry Mouth in the Morning: Yes  Shortness of Breath Lying Flat: No  Shortness of Breath With Activity: No  Awakening with Shortness of Breath: Yes  Increased Cough: No  Heart Racing at Night: Yes  Swelling in Feet or Legs: No  Diarrhea at Night: No  Heartburn at Night: No  Urinating More than Once at Night: Yes  Losing Control of Urine at Night: Yes  Joint Pains at Night: Yes  Headaches in Morning: No  Weakness in Arms or Legs: No  Depressed Mood: Yes  Anxiety: Yes     Physical Examination:  Vitals: Ht 1.651 m (5' 5\")   Wt 77.1 kg (170 lb)   LMP  (LMP Unknown)   BMI 28.29 kg/m    BMI= Body mass index is 28.29 kg/m .  General appearance: Awake, alert, cooperative. Well groomed. In no apparent distress.  HEENT: Head: Normocephalic, atraumatic. Eyes:Conjunctiva clear. Sclera normal. Nose: External appearance without deformity.   Pulmonary:  Able to speak easily in full sentences. No cough or wheeze.   Skin:  No rashes or significant lesions on visible skin.   Neurologic: Alert, oriented x3.   Psychiatric: Mood euthymic. Affect congruent with full range and intensity.         Data: All pertinent previous laboratory data reviewed     Recent Labs   Lab Test 02/19/24  1143 10/12/23  0836    141   POTASSIUM 4.6 4.8   CHLORIDE 104 102   CO2 28 26   ANIONGAP 9 13   * 88   BUN 11.5 14.6   CR 1.01* 1.00*   MIQUEL 9.8 9.8       Recent Labs   Lab Test 02/19/24  1143   WBC " "5.7   RBC 4.94   HGB 14.6   HCT 44.2   MCV 90   MCH 29.6   MCHC 33.0   RDW 11.6          Recent Labs   Lab Test 02/19/24  1143   PROTTOTAL 7.4   ALBUMIN 4.6   BILITOTAL 0.2   ALKPHOS 107   AST 39   ALT 58*       TSH (uIU/mL)   Date Value   02/19/2024 2.29       No results found for: \"UAMP\", \"UBARB\", \"BENZODIAZEUR\", \"UCANN\", \"UCOC\", \"OPIT\", \"UPCP\"    No results found for: \"IRONSAT\", \"[id number]\", \"KRISH\"    pH Arterial (no units)   Date Value   09/09/2008 6.5   08/18/2008 6.5       @LABRCNTIPR(phv:4,pco2v:4,po2v:4,hco3v:4,suzi:4,o2per:4)@    Echocardiology:   Study Date: 04/17/2024 01:32 PM  Age: 48 yrs  Gender: Female  Patient Location: [facility]  Reason For Study: Bradycardia, Chronic fatigue  Ordering Physician: JACOB OTERO  Referring Physician: JACOB OTERO  Performed By: Brayan Wheeler RDCS     BSA: 2.0 m2  Height: 66 in  Weight: 189 lb  HR: 75  BP: 114/72 mmHg  ______________________________________________________________________________  Procedure  Complete Echo Adult.  ______________________________________________________________________________  Interpretation Summary     Left ventricular systolic function is normal.  The visual ejection fraction is 55-60%.  No regional wall motion abnormalities noted.  The study was technically adequate. There is no comparison study available.    Chest x-ray: No results found for this or any previous visit from the past 365 days.      Chest CT:   CT Chest/Abdomen/Pelvis w Contrast 03/04/2024    Narrative  EXAM: CT CHEST/ABDOMEN/PELVIS W CONTRAST  LOCATION: [facility]  DATE: 3/4/2024    INDICATION: Endometrial cancer (H).  COMPARISON: CT chest 03/20/2023. CT abdomen and pelvis 03/20/2023.  TECHNIQUE: CT scan of the chest, abdomen, and pelvis was performed following injection of IV contrast. Multiplanar reformats were obtained. Dose reduction techniques were used.  CONTRAST: Isovue-370, 90 mL.    FINDINGS:  LUNGS AND PLEURA: No focal " "consolidation or pleural effusion. No new or growing suspicious pulmonary nodule.    MEDIASTINUM/AXILLAE: No lymphadenopathy. No thoracic aortic aneurysms.    CORONARY ARTERY CALCIFICATION: None.    HEPATOBILIARY: Similar scattered subcentimeter hypodensities. No new suspicious liver lesion. No calcified gallstone.    PANCREAS: Normal.    SPLEEN: Normal.    ADRENAL GLANDS: Normal.    KIDNEYS/BLADDER: Normal.    BOWEL: Normal.    LYMPH NODES: Pelvic lymph node dissection. No enlarged lymph node.    VASCULATURE: Nonaneurysmal abdominal aorta. Portal veins are patent.    PELVIC ORGANS: Hysterectomy. No adnexal mass.    MUSCULOSKELETAL: Ruptured left breast implant. No aggressive osseous lesion. Similar left L4 superior endplate sclerosis.    Impression  IMPRESSION:  1.  No evidence of metastatic disease within the chest, abdomen or pelvis.      PFT: Most Recent [facility] Pulmonary Function Testing    No results found for: \"20001\"  No results found for: \"20002\"  No results found for: \"20003\"  No results found for: \"20015\"  No results found for: \"20016\"  No results found for: \"20027\"  No results found for: \"20028\"  No results found for: \"20029\"  No results found for: \"20079\"  No results found for: \"20080\"  No results found for: \"20081\"  No results found for: \"20335\"  No results found for: \"20105\"  No results found for: \"20053\"  No results found for: \"20054\"  No results found for: \"20055\"      Beverly Antony PA-C 9/10/2024     [facility]  [address] [address]     [facility]  [address] [address]    Chart documentation was completed, in part, with [employer]ied[employer] voice-recognition software. Even though reviewed, some grammatical, spelling, and word errors may remain.    45 minutes spent on day of encounter reviewing medical records, history and physical examination, review of previous testing and interpretation, " documentation and further activities as noted above

## 2024-09-10 ENCOUNTER — THERAPY VISIT (OUTPATIENT)
Dept: OCCUPATIONAL THERAPY | Facility: REHABILITATION | Age: 49
End: 2024-09-10
Attending: STUDENT IN AN ORGANIZED HEALTH CARE EDUCATION/TRAINING PROGRAM
Payer: COMMERCIAL

## 2024-09-10 ENCOUNTER — VIRTUAL VISIT (OUTPATIENT)
Dept: SLEEP MEDICINE | Facility: CLINIC | Age: 49
End: 2024-09-10
Payer: COMMERCIAL

## 2024-09-10 VITALS — WEIGHT: 170 LBS | HEIGHT: 65 IN | BODY MASS INDEX: 28.32 KG/M2

## 2024-09-10 DIAGNOSIS — F41.1 GENERALIZED ANXIETY DISORDER: ICD-10-CM

## 2024-09-10 DIAGNOSIS — M79.2 NEUROPATHIC PAIN: ICD-10-CM

## 2024-09-10 DIAGNOSIS — F32.1 MAJOR DEPRESSIVE DISORDER, SINGLE EPISODE, MODERATE (H): ICD-10-CM

## 2024-09-10 DIAGNOSIS — C54.1 ENDOMETRIAL CANCER (H): ICD-10-CM

## 2024-09-10 DIAGNOSIS — G47.33 OSA (OBSTRUCTIVE SLEEP APNEA): Primary | ICD-10-CM

## 2024-09-10 DIAGNOSIS — R63.8 ALTERATION IN APPETITE: ICD-10-CM

## 2024-09-10 DIAGNOSIS — N95.1 VASOMOTOR SYMPTOMS DUE TO MENOPAUSE: ICD-10-CM

## 2024-09-10 DIAGNOSIS — R53.83 OTHER FATIGUE: ICD-10-CM

## 2024-09-10 DIAGNOSIS — C54.1 ENDOMETRIAL STROMAL SARCOMA (H): ICD-10-CM

## 2024-09-10 PROBLEM — N32.81 OAB (OVERACTIVE BLADDER): Status: ACTIVE | Noted: 2024-08-13

## 2024-09-10 PROBLEM — N39.46 MIXED STRESS AND URGE URINARY INCONTINENCE: Status: ACTIVE | Noted: 2024-08-13

## 2024-09-10 PROBLEM — M62.89 HIGH-TONE PELVIC FLOOR DYSFUNCTION: Status: ACTIVE | Noted: 2024-08-13

## 2024-09-10 PROCEDURE — 97165 OT EVAL LOW COMPLEX 30 MIN: CPT | Mod: GO | Performed by: OCCUPATIONAL THERAPIST

## 2024-09-10 PROCEDURE — 97535 SELF CARE MNGMENT TRAINING: CPT | Mod: GO | Performed by: OCCUPATIONAL THERAPIST

## 2024-09-10 PROCEDURE — 99204 OFFICE O/P NEW MOD 45 MIN: CPT | Mod: 95 | Performed by: PHYSICIAN ASSISTANT

## 2024-09-10 ASSESSMENT — PATIENT HEALTH QUESTIONNAIRE - PHQ9: SUM OF ALL RESPONSES TO PHQ QUESTIONS 1-9: 14

## 2024-09-10 NOTE — TELEPHONE ENCOUNTER
Called patient to schedule surgery with Dr. Tavares    Spoke with: Orquidea    Date of Surgery: 10/21    Approximate arrival time given:  Yes    Location of surgery: [facility]     Pre-Op H&P: [facility]    Post-Op Appt Date: 11/7 - 2 wk po with Dr. Tavares     Imaging needed:  No    Discussed with patient pre-op RN will call 2-3 days prior to surgery with arrival time and instructions:  Yes     Packet sent out: Yes 09/10/24  via [employer] Message      Additional Comments: NA    All patients questions were answered and was instructed to review surgical packet and call back with any questions or concerns.       Mirta Ernst on 9/10/2024 at 3:28 PM

## 2024-09-10 NOTE — PROGRESS NOTES
"OCCUPATIONAL THERAPY EVALUATION  Type of Visit: Evaluation        Fall Risk Screen:  Fall screen completed by: OT  Have you fallen 2 or more times in the past year?: No  Have you fallen and had an injury in the past year?: No  Is patient a fall risk?: No    Subjective      Presenting condition or subjective complaint: Per oncology visit in EMR on 5-23-24, \"Orquidea Arevalo is a 48 year old female with history of endometrial cancer who presents to [facility] for evaluation of her rehabilitation needs in the setting of right groin pain and fatigue. \"    Date of onset: 05/23/24 (date of referral to OT)    Relevant medical history: Arthritis; Bladder or bowel problems; Cancer; Depression; Dizziness; Hearing problems; Incontinence; Menopause; Mental Illness; Migraines or headaches; Numbness or tingling in perianal area; Overweight; Sleep disorder like apnea   Dates & types of surgery: Tubal ligation, breast augmentation, hysterectomy, cancer tumor removal    Prior diagnostic imaging/testing results:     See EMR  Prior therapy history for the same diagnosis, illness or injury: No      Prior Level of Function  Transfers: Independent  Ambulation: Independent  ADL: Independent  IADL: Independent    Living Environment  Social support: With a significant other or spouse   Type of home: House   Stairs to enter the home: Yes 9 Is there a railing: Yes     Ramp: No   Stairs inside the home: Yes 9 Is there a railing: Yes     Help at home: Home management tasks (cooking, cleaning); Home and Yard maintenance tasks  Equipment owned:       Employment: No    Hobbies/Interests: Coloring/drawing, walking, true crime, guitar, music    Patient goals for therapy: I cannot maintain a consistent routine or pace because i do not know when high fatigue and/or pain days will hit.  The pain and fatigue also add to depression/anxiety symptoms.    Pain assessment: Pain present, back and neck     Objective     Cognitive Status " Examination  Orientation: Oriented to person, place and time   Level of Consciousness: Alert  Follows Commands and Answers Questions: 100% of the time  Personal Safety and Judgement: Intact  Memory: Impaired  Attention: Reports problems attending  Organization/Problem Solving: Reports problems with organization  Executive Function: Working memory impaired, decreased storage of information for performing tasks, Planning ability impaired    VISUAL SKILLS  Visual Acuity: No deficits identified  Visual Field: Appears normal  Visual Attention: Appears normal  Oculomotor: NT    SENSATION: Right foot has long history of numbness and tingling    RANGE OF MOTION: UE AROM WNL  STRENGTH:   Pain: - none + mild ++ moderate +++ severe  Strength Scale: 0-5/5 Left Right    Strength (lbs) 56# 72#   Lateral Pinch (lbs) 12# 14#   3 Point Pinch (lbs) 13# 14#        Hand Dominance: Right  COORDINATION: WNL  BALANCE: WFL    FUNCTIONAL MOBILITY  Assistive Device(s): None  Ambulation: Independent    BED MOBILITY: Independent    TRANSFERS: Independent    BATHING: Independent  Equipment: none    UPPER BODY DRESSING: Independent  Equipment: none    LOWER BODY DRESSING: Independent  Equipment: none    TOILETING: Independent  Equipment: none    GROOMING: Independent  Equipment: none    EATING/SELF FEEDING: Independent   Equipment: none    ACTIVITY TOLERANCE: Fair    INSTRUMENTAL ACTIVITIES OF DAILY LIVING (IADL):   Meal Planning/Prep: Patient grocery shops online(delivery). She is able to cook however she has to take rest breaks during task if over 30 minutes  Home Management: Patient is independent however she has to take frequent rest breaks during task  Medication Management: Patient is independent and uses weekly medication box  Financial Management: Patient is independent  Communication/Computer Use: Independent  Community Mobility: Independent  Care of Others: N/A    MFIS Physical Subscale Score: 22/36- (Higher scores indicate a  greater impact of fatigue on patient activities)  MFIS Cognitive Subscale Score: 26/40 (Higher scores indicate a greater impact of fatigue on patient activities)  MFIS Psychosocial Subscale Score: 5/8 (Higher scores indicate a greater impact of fatigue on patient activities)  Modified Fatigue Impact Scale (MFIS) Total  Score: 53/84 (Higher scores indicate a greater impact of fatigue on patient activities)    Assessment & Plan   CLINICAL IMPRESSIONS  Medical Diagnosis: Endometrial cancer    Treatment Diagnosis: fatigue, memory and attention changes, decreased ADL and IADL function    Impression/Assessment: Pt is a 48 year old female presenting to Occupational Therapy due to cancer related fatigue, memory changes.  The following significant findings have been identified: Impaired activity tolerance, Pain, and memory/attention changes .  These identified deficits interfere with their ability to perform self care tasks, work tasks, recreational activities, household chores, and meal planning and preparation as compared to previous level of function.     Clinical Decision Making (Complexity):  Assessment of Occupational Performance: 3-5 Performance Deficits  Occupational Performance Limitations: functional mobility, home establishment and management, meal preparation and cleanup, sleep, and leisure activities  Clinical Decision Making (Complexity): Low complexity    PLAN OF CARE  Treatment Interventions:  Interventions: Self-Care/Home Management, Therapeutic Activity    Long Term Goals   OT Goal 1  Goal Identifier: fatigue  Goal Description: Patient to state 3 strategies for energy conservation/work simplification and fatigue management for improved independence with ADL/IADLs at home as evidenced by 10+ reduction on MFIS  Target Date: 12/03/24  OT Goal 2  Goal Identifier: memory and attention  Goal Description: Pt will verbalize understanding about strategies to help with improving attention (concentration) and internal  and external strategies to help with memory and recall into daily routine for improved ADL/IADL performance.  Target Date: 12/03/24  OT Goal 3  Goal Identifier: planning and organization  Goal Description: Patient will be able to complete a multi-item errand list with at least 90% accuracy in the appropriate amount of time and be most efficient to improve problem solving ability and sequencing in order to plan her day, complete sequencing tasks, etc. for return to independence running errands, planning events, etc.  Target Date: 12/03/24  OT Goal 4  Goal Identifier: sleep hygiene  Goal Description: Patient will verbalize knowledge of good sleep hygiene to improve sleep quality and decrease fatigue for increase ease of ADL/IADL's  Target Date: 12/03/24      Frequency of Treatment: once a week  Duration of Treatment: 12 weeks     Recommended Referrals to Other Professionals: no  Education Assessment: Learner/Method: Patient     Risks and benefits of evaluation/treatment have been explained.   Patient agrees with Plan of Care.     Evaluation Time:    OT Eval, Low Complexity Minutes (93835): 30       Signing Clinician: Yaneth Srinivasan OT

## 2024-09-10 NOTE — PROGRESS NOTES
Virtual Visit Details    Type of service:  Video Visit     Originating Location (pt. Location): Parked in car    Distant Location (provider location):  Off-site  Platform used for Video Visit: Himanshu

## 2024-09-10 NOTE — NURSING NOTE
Depression Response    Patient completed the PHQ-9 assessment for depression and scored >9? Yes  Question 9 on the PHQ-9 was positive for suicidality? No  Does patient have current mental health provider? Yes    Is this a virtual visit? Yes   Does patient have suicidal ideation (positive question 9)? No - offer to place Mental Health Referral.  Patient declined referral/not needed    I personally notified the following: visit provider    Current patient location: [address] [place] [id number]    Is the patient currently in the state of [place]? YES    Visit mode:VIDEO    If the visit is dropped, the patient can be reconnected by: VIDEO VISIT: Text to cell phone:   Telephone Information:   Mobile [phone]       Will anyone else be joining the visit? NO  (If patient encounters technical issues they should call [phone] :[id number])    How would you like to obtain your AVS? MyChart    Are changes needed to the allergy or medication list? No    Are refills needed on medications prescribed by this physician? NO    Rooming Documentation:  Questionnaire(s) completed      Reason for visit: Consult    Arsenio CARRANZAF

## 2024-09-11 ENCOUNTER — TRANSFERRED RECORDS (OUTPATIENT)
Dept: HEALTH INFORMATION MANAGEMENT | Facility: CLINIC | Age: 49
End: 2024-09-11

## 2024-09-17 ENCOUNTER — THERAPY VISIT (OUTPATIENT)
Dept: OCCUPATIONAL THERAPY | Facility: REHABILITATION | Age: 49
End: 2024-09-17
Attending: STUDENT IN AN ORGANIZED HEALTH CARE EDUCATION/TRAINING PROGRAM
Payer: COMMERCIAL

## 2024-09-17 DIAGNOSIS — C54.1 ENDOMETRIAL CANCER (H): ICD-10-CM

## 2024-09-17 DIAGNOSIS — R41.3 MEMORY CHANGES: ICD-10-CM

## 2024-09-17 DIAGNOSIS — R53.83 OTHER FATIGUE: Primary | ICD-10-CM

## 2024-09-17 PROCEDURE — 97535 SELF CARE MNGMENT TRAINING: CPT | Mod: GO | Performed by: OCCUPATIONAL THERAPIST

## 2024-09-23 ENCOUNTER — MYC REFILL (OUTPATIENT)
Dept: ONCOLOGY | Facility: CLINIC | Age: 49
End: 2024-09-23
Payer: COMMERCIAL

## 2024-09-23 DIAGNOSIS — E66.3 OVERWEIGHT WITH BODY MASS INDEX (BMI) 25.0-29.9: ICD-10-CM

## 2024-09-23 DIAGNOSIS — Z76.89 ENCOUNTER FOR WEIGHT MANAGEMENT: ICD-10-CM

## 2024-09-23 DIAGNOSIS — N95.1 VASOMOTOR SYMPTOMS DUE TO MENOPAUSE: ICD-10-CM

## 2024-09-23 DIAGNOSIS — C54.1 ENDOMETRIAL STROMAL SARCOMA (H): ICD-10-CM

## 2024-09-23 RX ORDER — PHENTERMINE HYDROCHLORIDE 15 MG/1
15 CAPSULE ORAL EVERY MORNING
Qty: 30 CAPSULE | Refills: 0 | Status: SHIPPED | OUTPATIENT
Start: 2024-09-23

## 2024-09-23 NOTE — TELEPHONE ENCOUNTER
Pending Prescriptions:                       Disp   Refills    gabapentin (NEURONTIN) 100 MG capsule     150 ca*3            Sig: Take 2 capsules (200 mg) by mouth every morning           AND 3 capsules (300 mg) at bedtime. If you           experience side effects decrease to last           tolerated dose and contact the clinic.  If not           receiving desired effect contact the clinic..    Last prescribing provider:     Last clinic visit date: 05/23/24 w/    Recommendations for requested medication (if none, N/A): Copied from last OV note: Gabapentin prescription adjusted as patient is not taking as ramp up was originally prescribed per chart review, and she indicated that prescription ramp up was a little confusing. Plan is to have her take 200 mg in the morning, and we will increase her bedtime dose to 300 mg. So she will take it 200 mg am/300 mg HS BID. Then after 3 weeks if this is working well and she would like to add an afternoon dose, she will reach out and we can add an afternoon 200 mg dose at that time. Her prescription was adjusted to reflect new dosing.     Any other pertinent information (if none, N/A): N/A    Refilled: Y/N, if NO, why?

## 2024-09-24 ENCOUNTER — THERAPY VISIT (OUTPATIENT)
Dept: OCCUPATIONAL THERAPY | Facility: REHABILITATION | Age: 49
End: 2024-09-24
Attending: STUDENT IN AN ORGANIZED HEALTH CARE EDUCATION/TRAINING PROGRAM
Payer: COMMERCIAL

## 2024-09-24 DIAGNOSIS — C54.1 ENDOMETRIAL CANCER (H): ICD-10-CM

## 2024-09-24 DIAGNOSIS — R53.83 OTHER FATIGUE: Primary | ICD-10-CM

## 2024-09-24 DIAGNOSIS — R41.3 MEMORY CHANGES: ICD-10-CM

## 2024-09-24 PROCEDURE — 97535 SELF CARE MNGMENT TRAINING: CPT | Mod: GO | Performed by: OCCUPATIONAL THERAPIST

## 2024-09-24 RX ORDER — GABAPENTIN 100 MG/1
CAPSULE ORAL
Qty: 150 CAPSULE | Refills: 3 | Status: SHIPPED | OUTPATIENT
Start: 2024-09-24

## 2024-09-26 ENCOUNTER — ONCOLOGY VISIT (OUTPATIENT)
Dept: ONCOLOGY | Facility: CLINIC | Age: 49
End: 2024-09-26
Attending: NURSE PRACTITIONER
Payer: COMMERCIAL

## 2024-09-26 VITALS
DIASTOLIC BLOOD PRESSURE: 87 MMHG | RESPIRATION RATE: 15 BRPM | OXYGEN SATURATION: 99 % | BODY MASS INDEX: 29.22 KG/M2 | TEMPERATURE: 98.1 F | HEART RATE: 68 BPM | WEIGHT: 175.6 LBS | SYSTOLIC BLOOD PRESSURE: 127 MMHG

## 2024-09-26 DIAGNOSIS — R10.31 RIGHT LOWER QUADRANT PAIN: ICD-10-CM

## 2024-09-26 DIAGNOSIS — Z85.42 HISTORY OF ENDOMETRIAL CANCER: Primary | ICD-10-CM

## 2024-09-26 DIAGNOSIS — R35.0 URINARY FREQUENCY: ICD-10-CM

## 2024-09-26 DIAGNOSIS — R14.0 BLOATING: ICD-10-CM

## 2024-09-26 PROCEDURE — 99213 OFFICE O/P EST LOW 20 MIN: CPT | Performed by: NURSE PRACTITIONER

## 2024-09-26 PROCEDURE — 99214 OFFICE O/P EST MOD 30 MIN: CPT | Performed by: NURSE PRACTITIONER

## 2024-09-26 ASSESSMENT — PAIN SCALES - GENERAL: PAINLEVEL: MODERATE PAIN (4)

## 2024-09-26 NOTE — PATIENT INSTRUCTIONS
Your visit today was with Rachael Mcfarland CNP for surveillance.    Plan:  Pelvic exam without concerning findings, but given your worsening symptoms of bloating, urinary frequency, and right lower quadrant pain, we are obtaining a CT of the abdomen and pelvis prior to your scheduled bladder surgery  If CT is normal, recommend returning in six months for surveillance- we can hold off on imaging at that time as long as you're feeling well.  If CT is abnormal, I will have you see one of our gynecologic oncology physicians    VULVOVAGINAL DRYNESS    Vaginal moisturizers  Hyaluronic acid based moisturizers can decrease symptoms of vaginal dryness  Osmolality is important consider. If this is too high, it can cause irritation and damage to the vaginal tissue. According to the World Health Organization, the ideal osmolality for a vaginal lubricant or moisturizer is <380mOsm/kg and should never be above 1200mOsm/kg. There are moisturizers in stores that have higher than recommended osmolality, so make sure to read the label.  Quality moisturizers that have an ideal osmolality often difficult to find in stores but can be found online  Examples: Hyalo-Gyn, Revaree, Good Clean Love BioNourish  Water-based lubricants  Pros: These lubricants are the most common, most affordable. They feel most like your body's natural lubrication. They are less messy than other types. They are compatible with condoms and other safe-sex barrier devices.  Cons: These lubricants will dry out fast. They usually contain preservatives which can make irritation worse.  Examples: Thayer Organics, [place] Water (do not use if you have sensitive skin), WaterSlide, Aloe Cadabra (good for sensitive skin), Sutil, Good Clean Love (available at most Target stores and has no preservatives).  Oil-based lubricants  Pros: These lubricants are hydrating and improve tissue elasticity over time. They last longer than water-based lubricants. They can be 100 percent  organic.  Cons: These lubricants are not compatible with latex or polyisoprene safe-sex barrier devices (such as condoms).  Examples: Southern Butter, organic coconut oil, organic olive oil, organic grapeseed oil.  Silicone-based lubricants  Pros: These lubricants are considered hypoallergenic. They are not absorbed into your skin and last the longest of any type of lubricant. They are compatible with all safe-sex barrier devices (such as condoms).  Cons: These lubricants are hard to find. They are waterproof and can be messy. They should not be used with silicone dilators or silicone sex toys.  Examples: Pink Silicone, Sensuva, Uberlube, Pjur.  Hybrid (silicone and water) lubricants  This is a newer type of lubricant. There is less information available about this type. They cause less irritation than just water-based lubricants.  Example: Good Clean Love Hybrid  The vulva can become dry and irritated as well, causing discomfort. Organic coconut oil is generally a well tolerated and inexpensive moisturizer that can be applied daily.      SIGNS AND SYMPTOMS OF RECURRENT DISEASE    Symptoms of cancer coming back can vary from person to person and it's important to know your own baseline and health factors that may cause symptoms. The following list includes symptoms that would warrant further investigation with your oncology team.    Vaginal bleeding or spotting  Persistent pelvic, abdominal, or bone pain that is persistent and does not improve over time  New persistent dry cough or shortness of breath at rest without a known cause  Unintentional weight loss  New swelling in the legs  New changes in bowel/bladder patterns  New, persistent bloating/fullness  New, persistent onset of drenching night sweats  New, persistent lumps or bumps in the groin or neck  New severe headaches, tunnel vision, double vision, or seizures  Sudden and persistent fatigue without a known cause, particularly if associated with any of the  above symptoms    If you have symptoms that are new, persistent, or concerning to you and have been ongoing for two weeks or longer, please contact your oncology care team.    [place] triage: [phone]    If you have difficulty reaching triage during off hours, you can call [phone] and ask to speak to the gynecologic oncology resident on call      For urgent questions or concerns, please call rather than send a medical message.

## 2024-09-26 NOTE — PROGRESS NOTES
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    About three weeks ago had a very tight, uncomfortable sensation in the abdomen similar to contractions- had a BM and ultimately this resolved. Still has intermittent tightening.    Has been feeling bloated, noticeably worse the past two months. Gone in the morning but worsens throughout the day. Hx IBS, last colonoscopy . Had some polyps, next due  per her report.    Intermittent RLQ pain, does not feel bony tenderness. Heating pad is soothing, but does not resolve the discomfort. When it's severe, she can't lay on that side. Tends to happen at night, usually gone by the morning. Not painful. Started about 2-3 months ago, worsening over time. No fevers or nausea/vomiting.    Feels like she has to urinate quite frequently, small amts at a time. Has seen urogyn in the past and had work up. Will be seeing Dr. Tavares in October for a bladder sling and cystoscopy.     Small amt of pink spotting after intercourse- doesn't happen every time. Occurring over the past year. [place] is painful with insertion. Using lubricant, not certain what kind.    Purposeful weight loss, started phentermine.    Hot flushes/night sweats controlled with gabapentin.       She denies unintentional weight loss, severe night sweats, new adenopathy or masses, trouble breathing, unexplained and persistent cough, early satiety, nausea/vomiting, abnormal vaginal discharge, or or any other new concerns not listed above.    ONCOLOGY HISTORY:  Symptoms began in 2019, right lower quadrant abdominal pain, normal menstrual period. Ultrasound revealed multiple fibroids.  Managed with Lupron considered but patient declined.    12/4/2019 Surgery and Procedures with OB/GYN  total laparoscopic hysterectomy, bilateral salpingectomy with only right oophorectomy. A presumed fibroid mass wrapped around a ligament. Left ovary appeared normal. Pathology reviewed at [place] shows low-grade endometrial stromal sarcoma arising in the uterus. [place] did not see it involving the fallopian tube but such was recorded/reported.     ([facility]: 14.5 cm LGESS extending into 1 FT; LVSI +)    12/27/2019, CT chest abdomen pelvis shows right and left pelvic masses possibly representing metastatic disease. Chest imaging was not definitive for metastases with some enlarged but not significantly enlarged nodes:  Postoperative changes of hysterectomy, bilateral salpingectomy, and right  oophorectomy.     Within the right adnexal region there is a 3.9 x 3.3 cm hyperenhancing soft  tissue mass concerning for metastatic disease or local recurrence. Expansive  filling defect within the right internal iliac vein that extends cranially to  the level of the mid right common iliac vein (series 4, image 166; series 605,  image 65). The density is similar to the right adnexal mass, which is concerning  for tumor thrombus. No evidence of DVT in the right external iliac or common  femoral veins.    Within the left adnexa there is a 7.1 x 3.7 cm loculated cystic lesion.     Multiple tiny hypodense hepatic lesions most likely represent benign  hemangiomas/cysts, but are indeterminate for metastases. The gallbladder,  pancreas, adrenal glands, and kidneys are normal. Small splenules. No  lymphadenopathy in the abdomen or pelvis. Normal caliber small and large bowel.  No aggressive osseous lesions.    1/17/2020 Tumor Board   Path review: LGESS arising from the myometrium perhaps extending to uterine cornua but no evidence of the slides submitted that there is any disease on a fallopian tube or ovary  Rad review: Some LGESS may  have FDG update on PET; mediastinal node is suspicious   Biopsy of the mediastinal node and if positive, consider resection vs. adjuvant treatment (hormone therapy) after surgery  Return to the OR for resection of residual disease and LSO    2/10/2020 Surgery and Procedures:02/10/2020 EXPLORATORY LAPAROTOMY., LEFT OOPHORECTOMY, RESECTION PELVIC MASS., UPPER VAGINECTOMY, CYSTOSCOPY, INSERTION STENT URETER., Resection of right internal iliac vein with intravenous tumor. Repair of right common iliac vein., Lymphadenectomy     Surgery  Exlap, resection of pelvic disease remaining from original surgery elsewhere. Resection of tumor mass within the right pelvic iliac vein system.   Complete gross resection.   Stage IIB    3/7/2020 - Biological/Targeted/Hormone Therapy   Letrozole 2.5 daily. At first month, nausea is minimal. Hot flashes are ok. Working from home and staying active. 2-3 mi walking 5 days. CrossFit 40 min three times weekly. Baseline bone mineral density is good, no osteopenia.     3/2023: Letrozole stopped due to side effects      3/20/23: CT CAP     COMPARISON:  Multiple priors, most recently CT 06/11/2022     FINDINGS:     Prior hysterectomy, bilateral salpingo-oophorectomy, right iliac vein resection, and partial   vaginectomy for endometrial stromal sarcoma. Postsurgical changes and surgical clips in the pelvis.     There is tethering of the right distal ureter in the pelvis with mild associated urothelial   thickening and enhancement, for example series 1, image 111. Mild upstream hydroureter without   hydronephrosis. The contralateral ureter is normal. The bladder is within expected limits. No   recurrent soft tissue in the pelvis or along the iliac veins. No new adenopathy or free fluid.     Multiple hypoattenuating lesions in the liver are stable dating back to 01/22/2020 and likely   represent benign cysts. No suspicious hepatic masses. Non-cirrhotic morphology of the liver with   patent portal  and hepatic veins.     The gallbladder and biliary tree are normal. No suspicious pancreatic masses. The adrenal glands and   kidneys are normal. Normal spleen with 2 small splenules. The bowel is within expected limits.     Tiny fat-containing umbilical hernia. No aggressive osseous lesions.     A CT chest was performed at the same time which will be reported separately.     IMPRESSION:   1.  No findings of recurrence in the abdomen or pelvis.   2.  A CT chest was performed at the same time which will be reported separately.     CT chest:  FINDINGS:   This examination was performed in conjunction with a CT of the abdomen, which will be reported   separately.     No suspicious lung nodule. No bulky thoracic lymphadenopathy. No suspicious osseous lesion. No   pleural effusion. Interval left breast implant rupture. Right breast implant appears similar since   prior exam.     IMPRESSION:   1.  No thoracic metastatic disease.   2.  Interval left breast implant rupture.     11/2023: DXA scan  NORMAL. Bone mineral density measurements are within normal limits using T score.     3/4/24: CT CAP JOSR    OBJECTIVE:    PHYSICAL EXAM:  /87 (BP Location: Right arm, Patient Position: Sitting, Cuff Size: Adult Regular)   Pulse 68   Temp 98.1  F (36.7  C) (Oral)   Resp 15   Wt 79.7 kg (175 lb 9.6 oz)   LMP  (LMP Unknown)   SpO2 99%   BMI 29.22 kg/m       CONSTITUTIONAL: Alert non-toxic appearing female in no acute distress  RESPIRATORY: Respiratory effort unlabored,  lungs clear to auscultation  CV/PV: Bilateral lower extremities without edema  GASTROINTESTINAL: Abdomen soft, non-distended, without masses or organomegaly- LLQ tender to palpation, but no rebound tenderness, guarding, or rigidity. No significant RLQ tenderness and no tenderness to McBurney's point  GENITOURINARY: External genitalia and urethral meatus pink without lesions, masses, or excoriation. Vagina pink and smooth without masses or lesions- appears  atrophic. Cervix surgically absent. Vaginal cuff without masses or lesions. Bimanual exam reveals no masses or fullness. Rectovaginal exam confirms these findings.  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 If imaging without concern for recurrent disease, will have her return in six months for surveillance (no CT at that visit, will have her see me at that time, then will obtain CT at subsequent 6mo surveillance visit and have her see Dr. Chowdary). If there are concerning findings on imaging, will have her follow up with gynecologic oncologist.  Reviewed signs and symptoms of recurrent disease and when to seek further care.    Treatment/disease related effects:  Dyspareunia: Suspect secondary to GSM- reviewed vaginal moisturizers and lubricants    Genetics:   Risk factors assessed, genetic counseling referral not indicated at this time.    Health maintenance:   Follow up with PCP for routine cancer screenings, non-gynecologic concerns, and co-morbid conditions    Patient verbalized understanding of and agreement with plan- see AVS for patient instructions      38 minutes spent on date of service on visit, including chart review, face to face visit, documentation, and coordination of care.    ADITYA Tidwell, CNP  [facility]  Division of Gynecologic Oncology

## 2024-09-26 NOTE — NURSING NOTE
"Oncology Rooming Note    September 26, 2024 1:07 PM   Orquidea Arevalo is a 48 year old female who presents for:    Chief Complaint   Patient presents with    Oncology Clinic Visit     Endometrial stromal sarcoma      Initial Vitals: /87 (BP Location: Right arm, Patient Position: Sitting, Cuff Size: Adult Regular)   Pulse 68   Temp 98.1  F (36.7  C) (Oral)   Resp 15   Wt 79.7 kg (175 lb 9.6 oz)   LMP  (LMP Unknown)   SpO2 99%   BMI 29.22 kg/m   Estimated body mass index is 29.22 kg/m  as calculated from the following:    Height as of 9/10/24: 1.651 m (5' 5\").    Weight as of this encounter: 79.7 kg (175 lb 9.6 oz). Body surface area is 1.91 meters squared.  Moderate Pain (4) Comment: Data Unavailable   No LMP recorded (lmp unknown). Patient has had a hysterectomy.  Allergies reviewed: Yes  Medications reviewed: Yes    Medications: Medication refills not needed today.  Pharmacy name entered into [facility]: [facility] [address] [place] [address] [id number]    Frailty Screening:   Is the patient here for a new oncology consult visit in cancer care? 2. No      Clinical concerns: Pt reports having some pain on and off, and severe bloating throughout the day. Pt also would like to talk about bladder surgery on Oct. 21st. Rachael was notified via message.        Caitlyn Vallecillo, EMT     "

## 2024-09-26 NOTE — LETTER
2024      Orquidea Arevalo  [address]  [place] [address]      Dear Colleague,    Thank you for referring your patient, Orquidea Arevalo, to the [facility]. Please see a copy of my visit note below.    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    About three weeks ago had a very tight, uncomfortable sensation in the abdomen similar to contractions- had a BM and ultimately this resolved. Still has intermittent tightening.    Has been feeling bloated, noticeably worse the past two months. Gone in the morning but worsens throughout the day. Hx IBS, last colonoscopy . Had some polyps, next due  per her report.    Intermittent RLQ pain, does not feel bony tenderness. Heating pad is soothing, but does not resolve the discomfort. When it's severe, she can't lay on that side. Tends to happen at night, usually gone by the morning. Not painful. Started about 2-3 months ago, worsening over time. No fevers or nausea/vomiting.    Feels like she has to urinate quite frequently, small amts at a time. Has seen urogyn in the past and had work up. Will be seeing Dr. Tavares in October for a bladder sling and cystoscopy.     Small amt of pink spotting after intercourse- doesn't happen every time. Occurring over the past year. [place] is painful with insertion. Using lubricant, not certain what kind.    Purposeful weight loss, started phentermine.    Hot flushes/night sweats controlled with gabapentin.       She denies unintentional weight loss, severe night sweats, new adenopathy or masses, trouble breathing, unexplained and persistent cough, early  satiety, nausea/vomiting, abnormal vaginal discharge, or or any other new concerns not listed above.    ONCOLOGY HISTORY:  Symptoms began in August 2019, right lower quadrant abdominal pain, normal menstrual period. Ultrasound revealed multiple fibroids. Managed with Lupron considered but patient declined.    12/4/2019 Surgery and Procedures with OB/GYN  total laparoscopic hysterectomy, bilateral salpingectomy with only right oophorectomy. A presumed fibroid mass wrapped around a ligament. Left ovary appeared normal. Pathology reviewed at [place] shows low-grade endometrial stromal sarcoma arising in the uterus. [place] did not see it involving the fallopian tube but such was recorded/reported.     ([facility]: 14.5 cm LGESS extending into 1 FT; LVSI +)    12/27/2019, CT chest abdomen pelvis shows right and left pelvic masses possibly representing metastatic disease. Chest imaging was not definitive for metastases with some enlarged but not significantly enlarged nodes:  Postoperative changes of hysterectomy, bilateral salpingectomy, and right  oophorectomy.     Within the right adnexal region there is a 3.9 x 3.3 cm hyperenhancing soft  tissue mass concerning for metastatic disease or local recurrence. Expansive  filling defect within the right internal iliac vein that extends cranially to  the level of the mid right common iliac vein (series 4, image 166; series 605,  image 65). The density is similar to the right adnexal mass, which is concerning  for tumor thrombus. No evidence of DVT in the right external iliac or common  femoral veins.    Within the left adnexa there is a 7.1 x 3.7 cm loculated cystic lesion.     Multiple tiny hypodense hepatic lesions most likely represent benign  hemangiomas/cysts, but are indeterminate for metastases. The gallbladder,  pancreas, adrenal glands, and kidneys are normal. Small splenules. No  lymphadenopathy in the abdomen or pelvis. Normal caliber small and large bowel.  No  aggressive osseous lesions.    1/17/2020 Tumor Board   Path review: LGESS arising from the myometrium perhaps extending to uterine cornua but no evidence of the slides submitted that there is any disease on a fallopian tube or ovary  Rad review: Some LGESS may have FDG update on PET; mediastinal node is suspicious   Biopsy of the mediastinal node and if positive, consider resection vs. adjuvant treatment (hormone therapy) after surgery  Return to the OR for resection of residual disease and LSO    2/10/2020 Surgery and Procedures:02/10/2020 EXPLORATORY LAPAROTOMY., LEFT OOPHORECTOMY, RESECTION PELVIC MASS., UPPER VAGINECTOMY, CYSTOSCOPY, INSERTION STENT URETER., Resection of right internal iliac vein with intravenous tumor. Repair of right common iliac vein., Lymphadenectomy     Surgery  Exlap, resection of pelvic disease remaining from original surgery elsewhere. Resection of tumor mass within the right pelvic iliac vein system.   Complete gross resection.   Stage IIB    3/7/2020 - Biological/Targeted/Hormone Therapy   Letrozole 2.5 daily. At first month, nausea is minimal. Hot flashes are ok. Working from home and staying active. 2-3 mi walking 5 days. CrossFit 40 min three times weekly. Baseline bone mineral density is good, no osteopenia.     3/2023: Letrozole stopped due to side effects      3/20/23: CT CAP     COMPARISON:  Multiple priors, most recently CT 06/11/2022     FINDINGS:     Prior hysterectomy, bilateral salpingo-oophorectomy, right iliac vein resection, and partial   vaginectomy for endometrial stromal sarcoma. Postsurgical changes and surgical clips in the pelvis.     There is tethering of the right distal ureter in the pelvis with mild associated urothelial   thickening and enhancement, for example series 1, image 111. Mild upstream hydroureter without   hydronephrosis. The contralateral ureter is normal. The bladder is within expected limits. No   recurrent soft tissue in the pelvis or along the  iliac veins. No new adenopathy or free fluid.     Multiple hypoattenuating lesions in the liver are stable dating back to 01/22/2020 and likely   represent benign cysts. No suspicious hepatic masses. Non-cirrhotic morphology of the liver with   patent portal and hepatic veins.     The gallbladder and biliary tree are normal. No suspicious pancreatic masses. The adrenal glands and   kidneys are normal. Normal spleen with 2 small splenules. The bowel is within expected limits.     Tiny fat-containing umbilical hernia. No aggressive osseous lesions.     A CT chest was performed at the same time which will be reported separately.     IMPRESSION:   1.  No findings of recurrence in the abdomen or pelvis.   2.  A CT chest was performed at the same time which will be reported separately.     CT chest:  FINDINGS:   This examination was performed in conjunction with a CT of the abdomen, which will be reported   separately.     No suspicious lung nodule. No bulky thoracic lymphadenopathy. No suspicious osseous lesion. No   pleural effusion. Interval left breast implant rupture. Right breast implant appears similar since   prior exam.     IMPRESSION:   1.  No thoracic metastatic disease.   2.  Interval left breast implant rupture.     11/2023: DXA scan  NORMAL. Bone mineral density measurements are within normal limits using T score.     3/4/24: CT CAP JOSR    OBJECTIVE:    PHYSICAL EXAM:  /87 (BP Location: Right arm, Patient Position: Sitting, Cuff Size: Adult Regular)   Pulse 68   Temp 98.1  F (36.7  C) (Oral)   Resp 15   Wt 79.7 kg (175 lb 9.6 oz)   LMP  (LMP Unknown)   SpO2 99%   BMI 29.22 kg/m       CONSTITUTIONAL: Alert non-toxic appearing female in no acute distress  RESPIRATORY: Respiratory effort unlabored,  lungs clear to auscultation  CV/PV: Bilateral lower extremities without edema  GASTROINTESTINAL: Abdomen soft, non-distended, without masses or organomegaly- LLQ tender to palpation, but no rebound  tenderness, guarding, or rigidity. No significant RLQ tenderness and no tenderness to McBurney's point  GENITOURINARY: External genitalia and urethral meatus pink without lesions, masses, or excoriation. Vagina pink and smooth without masses or lesions- appears atrophic. Cervix surgically absent. Vaginal cuff without masses or lesions. Bimanual exam reveals no masses or fullness. Rectovaginal exam confirms these findings.  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 If imaging without concern for recurrent disease, will have her return in six months for surveillance (no CT at that visit, will have her see me at that time, then will obtain CT at subsequent 6mo surveillance visit and have her see Dr. Chowdary). If there are concerning findings on imaging, will have her follow up with gynecologic oncologist.  Reviewed signs and symptoms of recurrent disease and when to seek further care.    Treatment/disease related effects:  Dyspareunia: Suspect secondary to GSM- reviewed vaginal moisturizers and lubricants    Genetics:   Risk factors assessed, genetic counseling referral not indicated at this time.    Health maintenance:   Follow up with PCP for routine cancer screenings, non-gynecologic concerns, and co-morbid conditions    Patient verbalized understanding of and agreement with plan- see AVS for patient instructions      38 minutes spent on date of service on visit, including chart review,  face to face visit, documentation, and coordination of care.    ADITYA Tidwell, CNP  [facility]  Division of Gynecologic Oncology        Again, thank you for allowing me to participate in the care of your patient.        Sincerely,        ADITYA Tidwell CNP

## 2024-09-30 ENCOUNTER — MYC MEDICAL ADVICE (OUTPATIENT)
Dept: UROLOGY | Facility: CLINIC | Age: 49
End: 2024-09-30
Payer: COMMERCIAL

## 2024-09-30 DIAGNOSIS — R39.89 SUSPECTED URINARY TRACT INFECTION: Primary | ICD-10-CM

## 2024-10-04 ENCOUNTER — OFFICE VISIT (OUTPATIENT)
Dept: FAMILY MEDICINE | Facility: CLINIC | Age: 49
End: 2024-10-04
Payer: COMMERCIAL

## 2024-10-04 VITALS
RESPIRATION RATE: 16 BRPM | OXYGEN SATURATION: 97 % | DIASTOLIC BLOOD PRESSURE: 74 MMHG | TEMPERATURE: 97.7 F | HEIGHT: 65 IN | BODY MASS INDEX: 29.22 KG/M2 | WEIGHT: 175.4 LBS | SYSTOLIC BLOOD PRESSURE: 118 MMHG | HEART RATE: 56 BPM

## 2024-10-04 DIAGNOSIS — R76.0 ANTI-CARDIOLIPIN ANTIBODY POSITIVE: ICD-10-CM

## 2024-10-04 DIAGNOSIS — Z01.818 PREOP GENERAL PHYSICAL EXAM: Primary | ICD-10-CM

## 2024-10-04 DIAGNOSIS — M62.89 HIGH-TONE PELVIC FLOOR DYSFUNCTION: ICD-10-CM

## 2024-10-04 DIAGNOSIS — G47.33 OSA (OBSTRUCTIVE SLEEP APNEA): ICD-10-CM

## 2024-10-04 DIAGNOSIS — N32.81 OAB (OVERACTIVE BLADDER): ICD-10-CM

## 2024-10-04 DIAGNOSIS — K58.1 IRRITABLE BOWEL SYNDROME WITH CONSTIPATION: ICD-10-CM

## 2024-10-04 DIAGNOSIS — N39.46 MIXED INCONTINENCE URGE AND STRESS (MALE)(FEMALE): ICD-10-CM

## 2024-10-04 LAB
ANION GAP SERPL CALCULATED.3IONS-SCNC: 8 MMOL/L (ref 7–15)
BUN SERPL-MCNC: 15 MG/DL (ref 6–20)
CALCIUM SERPL-MCNC: 9.2 MG/DL (ref 8.8–10.4)
CHLORIDE SERPL-SCNC: 102 MMOL/L (ref 98–107)
CREAT SERPL-MCNC: 0.98 MG/DL (ref 0.51–0.95)
EGFRCR SERPLBLD CKD-EPI 2021: 70 ML/MIN/1.73M2
ERYTHROCYTE [DISTWIDTH] IN BLOOD BY AUTOMATED COUNT: 11.6 % (ref 10–15)
GLUCOSE SERPL-MCNC: 100 MG/DL (ref 70–99)
HCO3 SERPL-SCNC: 28 MMOL/L (ref 22–29)
HCT VFR BLD AUTO: 41.8 % (ref 35–47)
HGB BLD-MCNC: 13.9 G/DL (ref 11.7–15.7)
MCH RBC QN AUTO: 29.6 PG (ref 26.5–33)
MCHC RBC AUTO-ENTMCNC: 33.3 G/DL (ref 31.5–36.5)
MCV RBC AUTO: 89 FL (ref 78–100)
PLATELET # BLD AUTO: 221 10E3/UL (ref 150–450)
POTASSIUM SERPL-SCNC: 4.9 MMOL/L (ref 3.4–5.3)
RBC # BLD AUTO: 4.69 10E6/UL (ref 3.8–5.2)
SODIUM SERPL-SCNC: 138 MMOL/L (ref 135–145)
WBC # BLD AUTO: 4.1 10E3/UL (ref 4–11)

## 2024-10-04 PROCEDURE — 99214 OFFICE O/P EST MOD 30 MIN: CPT | Performed by: NURSE PRACTITIONER

## 2024-10-04 PROCEDURE — 36415 COLL VENOUS BLD VENIPUNCTURE: CPT | Performed by: NURSE PRACTITIONER

## 2024-10-04 PROCEDURE — 80048 BASIC METABOLIC PNL TOTAL CA: CPT | Performed by: NURSE PRACTITIONER

## 2024-10-04 PROCEDURE — 85027 COMPLETE CBC AUTOMATED: CPT | Performed by: NURSE PRACTITIONER

## 2024-10-04 ASSESSMENT — PATIENT HEALTH QUESTIONNAIRE - PHQ9: SUM OF ALL RESPONSES TO PHQ QUESTIONS 1-9: 9

## 2024-10-04 NOTE — PATIENT INSTRUCTIONS
How to Take Your Medication Before Surgery  Preoperative Medication Instructions     Phentermine for 7 days prior to procedure            Patient Education   Preparing for Your Surgery  For Adults  Getting started  In most cases, a nurse will call to review your health history and instructions. They will give you an arrival time based on your scheduled surgery time. Please be ready to share:  Your doctor's clinic name and phone number  Your medical, surgical, and anesthesia history  A list of allergies and sensitivities  A list of medicines, including herbal treatments and over-the-counter drugs  Whether the patient has a legal guardian (ask how to send us the papers in advance)  Note: You may not receive a call if you were seen at our PAC (Preoperative Assessment Center).  Please tell us if you're pregnant--or if there's any chance you might be pregnant. Some surgeries may injure a fetus (unborn baby), so they require a pregnancy test. Surgeries that are safe for a fetus don't always need a test, and you can choose whether to have one.   Preparing for surgery  Within 10 to 30 days of surgery: Have a pre-op exam (sometimes called an H&P, or History and Physical). This can be done at a clinic or pre-operative center.  If you're having a , you may not need this exam. Talk to your care team.  At your pre-op exam, talk to your care team about all medicines you take. (This includes CBD oil and any drugs, such as THC, marijuana, and other forms of cannabis.) If you need to stop any medicine before surgery, ask when to start taking it again.  This is for your safety. Many medicines and drugs can make you bleed too much during surgery. Some change how well surgery (anesthesia) drugs work.  Call your insurance company to let them know you're having surgery. (If you don't have insurance, call [phone].)  Call your clinic if there's any change in your health. This includes a scrape or scratch near the surgery  site, or any signs of a cold (sore throat, runny nose, cough, rash, fever).  Eating and drinking guidelines  For your safety: Unless your surgeon tells you otherwise, follow the guidelines below.  Eat and drink as normal until 8 hours before you arrive for surgery. After that, no food or milk. You can spit out gum when you arrive.  Drink clear liquids until 2 hours before you arrive. These are liquids you can see through, like water, Gatorade, and Propel Water. They also include plain black coffee and tea (no cream or milk).  No alcohol for 24 hours before you arrive. The night before surgery, stop any drinks that contain THC.  If your care team tells you to take medicine on the morning of surgery, it's okay to take it with a sip of water. No other medicines or drugs are allowed (including CBD oil)--follow your care team's instructions.  If you have questions the day of surgery, call your hospital or surgery center.   Preventing infection  Shower or bathe the night before and the morning of surgery. Follow the instructions your clinic gave you. (If no instructions, use regular soap.)  Don't shave or clip hair near your surgery site. We'll remove the hair if needed.  Don't smoke or vape the morning of surgery. No chewing tobacco for 6 hours before you arrive. A nicotine patch is okay. You may spit out nicotine gum when you arrive.  For some surgeries, the surgeon will tell you to fully quit smoking and nicotine.  We will make every effort to keep you safe from infection. We will:  Clean our hands often with soap and water (or an alcohol-based hand rub).  Clean the skin at your surgery site with a special soap that kills germs.  Give you a special gown to keep you warm. (Cold raises the risk of infection.)  Wear hair covers, masks, gowns, and gloves during surgery.  Give antibiotic medicine, if prescribed. Not all surgeries need this medicine.  What to bring on the day of surgery  Photo ID and insurance card  Copy of  your health care directive, if you have one  Glasses and hearing aids (bring cases)  You can't wear contacts during surgery  Inhaler and eye drops, if you use them (tell us about these when you arrive)  CPAP machine or breathing device, if you use them  A few personal items, if spending the night  If you have . . .  A pacemaker, ICD (cardiac defibrillator), or other implant: Bring the ID card.  An implanted stimulator: Bring the remote control.  A legal guardian: Bring a copy of the certified (court-stamped) guardianship papers.  Please remove any jewelry, including body piercings. Leave jewelry and other valuables at home.  If you're going home the day of surgery  You must have a responsible adult drive you home. They should stay with you overnight as well.  If you don't have someone to stay with you, and you aren't safe to go home alone, we may keep you overnight. Insurance often won't pay for this.  After surgery  If it's hard to control your pain or you need more pain medicine, please call your surgeon's office.  Questions?   If you have any questions for your care team, list them here:   ____________________________________________________________________________________________________________________________________________________________________________________________________________________________________________________________  For informational purposes only. Not to replace the advice of your health care provider. Copyright   2003, 2019 [facility]. All rights reserved. Clinically reviewed by Saqib Elizabeth MD. "[employer] [id number] - REV 08/24.

## 2024-10-04 NOTE — PROGRESS NOTES
Preoperative Evaluation  [facility]  [address] [address]  Phone: [phone]  Fax: [phone]  Primary Provider: ADITYA Crain CNP  Pre-op Performing Provider: ADITYA Crain CNP  Oct 4, 2024             10/4/2024   Surgical Information   What procedure is being done? mid urethral sling   Facility or Hospital where procedure/surgery will be performed: [facility]   Who is doing the procedure / surgery? Basia Tavares   Date of surgery / procedure: 10/21/2024   Time of surgery / procedure: unk   Where do you plan to recover after surgery? at home with family        Fax number for surgical facility: Note does not need to be faxed, will be available electronically in Epic.    Assessment & Plan     The proposed surgical procedure is considered INTERMEDIATE risk.    Preop general physical exam  High-tone pelvic floor dysfunction  Mixed incontinence urge and stress (male)(female)  OAB (overactive bladder)  Patient with multiple significant urinary concerns - followed by Uro for treatment suggested for urethral sling.  Discussed with pt to hold phentermine, may take after the procedure.   May continue with planned procedure. No need for EKG today based on low cardiac risk.   - Basic metabolic panel  (Ca, Cl, CO2, Creat, Gluc, K, Na, BUN)  - CBC with platelets  - Basic metabolic panel  (Ca, Cl, CO2, Creat, Gluc, K, Na, BUN)  - CBC with platelets    Anti-cardiolipin antibody positive  Test completed by [facility] in 2020 FYI if transfusion is needed.     RADHA (obstructive sleep apnea)  Bring CPAP to surgery   Anesthesiology to be aware of airway and oxygenation     Irritable Bowel syndrome with constipation  Discussed low FODMAP food trail - discussed three phases and home trial   Use miralax regularly to prevent increased stool burden and back up - discussed that this can put pressure on bladder and also exacerbate urinary  frequency/OAB.  Today PE significant for LLQ tenderness on palpation             Risks and Recommendations  The patient has the following additional risks and recommendations for perioperative complications:  Obstructive Sleep Apnea:   Uses CPAP    Antiplatelet or Anticoagulation Medication Instructions   - Patient is on no antiplatelet or anticoagulation medications.    Additional Medication Instructions   - phentermine: DO NOT TAKE 7 days prior to surgery.    Recommendation  Approval given to proceed with proposed procedure, without further diagnostic evaluation.    Subjective   Raffi is a 48 year old, presenting for the following:  Pre-Op Exam (Bladder surgery )        10/4/2024     8:47 AM   Additional Questions   Roomed by tiffanie najera   Accompanied by vikas WALLER related to upcoming procedure:   Diagnosis   Mixed stress and urge urinary incontinence   High-tone pelvic floor dysfunction   OAB (overactive bladder)   History of hysterectomy for cancer     Currently urinating frequently 14 times/day, small amounts, get urgency, occasional accidents, wearing a pad regularly, daily leaking, denies dysuria, has occasionally.      No lower back pain     Does have more constipation than diarrhea at baseline. Currently 3 days between BM.    Takes mirilax rarely - due to diarrhea     Pt with hx of anti-c antibody 2/10/2020 - done at [facility].     Has RADHA - ca - 4hour.nights.         10/4/2024   Pre-Op Questionnaire   Have you ever had a heart attack or stroke? No   Have you ever had surgery on your heart or blood vessels, such as a stent placement, a coronary artery bypass, or surgery on an artery in your head, neck, heart, or legs? No   Do you have chest pain with activity? No   Do you have a history of heart failure? No   Do you currently have a cold, bronchitis or symptoms of other infection? No   Do you have a cough, shortness of breath, or wheezing? No   Do you or anyone in your family have previous history of blood  clots? (!) YES - maternal aunt (brain aneurism) and maternal grandmother (Heart attack)    Do you or does anyone in your family have a serious bleeding problem such as prolonged bleeding following surgeries or cuts? No   Have you ever had problems with anemia or been told to take iron pills? No   Have you had any abnormal blood loss such as black, tarry or bloody stools, or abnormal vaginal bleeding? No   Have you ever had a blood transfusion? No   Are you willing to have a blood transfusion if it is medically needed before, during, or after your surgery? Yes   Have you or any of your relatives ever had problems with anesthesia? No   Do you have sleep apnea, excessive snoring or daytime drowsiness? (!) YES - sleep apnea - on CPAP    Do you have a CPAP machine? Yes   Do you have any artifical heart valves or other implanted medical devices like a pacemaker, defibrillator, or continuous glucose monitor? No   Do you have artificial joints? No   Are you allergic to latex? No        Health Care Directive  Patient does not have a Health Care Directive or Living Will: Patient states has Advance Directive and will bring in a copy to clinic.    Preoperative Review of    reviewed - no record of controlled substances prescribed.          Patient Active Problem List    Diagnosis Date Noted    Anti-cardiolipin antibody positive 10/04/2024     Priority: Medium     Pt with hx of anti-c antibody 2/10/2020 - done at [facility].       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 Surveillance to include a pelvic and rectal exam with provider visit.  No pap smear indicated.   - tumor affected iliac vein on R side - s/p clips thought initially to be DVT       Elevated serum creatinine 02/19/2024     Priority: Medium    Chronic midline low back pain without sciatica 02/19/2024     Priority: Medium     Continue followed by chiropractor and Flexeril as needed.  Refilling today  - cyclobenzaprine 10mg      Fatigue, unspecified type 02/19/2024     Priority: Medium     Sleep study completed 5/20/2024 normal      History of breast augmentation 02/19/2024     Priority: Medium     Done in 2007 bilaterally.  The saline implants.  History of rupture and left side      Dizziness 11/20/2023     Priority: Medium    Migraine without aura 11/20/2023     Priority: Medium     7/26/24 - Headaches: triggers: lack of sleep, stress  HA - nausea, photo and phono-sensitive  Helps: qulipta 60mg PRN (from allergist - has samples), closing eyes, laying down, take a nap, ibuprofen doesn't help.  Zofran helpful for nausea.  Emgality for 1 month - still HA while on this 1/2024    - never started topamax - from [facility]       Tinnitus, left 11/20/2023     Priority: Medium    Functional urinary incontinence 10/26/2023     Priority: Medium    Major depressive disorder, single episode, moderate (H) 05/19/2023     Priority: Medium     Patient followed for all mental health conditions by psychiatrist.   [facility]      She feels she is stable on Zoloft  75 mg daily, Wellbutrin 150 mg extended release daily as well as Ativan as needed.      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 Surveillance to include a pelvic and rectal exam with provider visit.  No pap smear indicated.       No longer on letrozole due to side effects  Cannot be on Estradiol replacement        Atypical squamous cells of undetermined significance (ASCUS) on Papanicolaou smear of cervix 10/23/2019     Priority: Medium     Formatting of this note might be different from the original.   Formatting of this note might be different from the original. 02/04/2010 ASCUS/HPV Negative.   10/31/2012 ASCUS/HPV Negative   08/23/2019 ASCUS/HPV Negative   Plan: Pap/HPV due 8/2020 Formatting of this note might be different from the original. Formatting of this note might be different from the original. 02/04/2010 ASCUS/HPV Negative. 10/31/2012 ASCUS/HPV Negative 08/23/2019 ASCUS/HPV Negative Plan: Pap/HPV due 8/2020 Formatting of this note might be different from the original. 02/04/2010 ASCUS/HPV Negative. 10/31/2012 ASCUS/HPV Negative 08/23/2019 ASCUS/HPV Negative Plan: Pap/HPV due 8/2020 Formatting of this note might be different from the original. 02/04/2010 ASCUS/HPV Negative.   10/31/2012 ASCUS/HPV Negative   08/23/2019 ASCUS/HPV Negative   Plan: Pap/HPV due 8/2020 Formatting of this note might be different from the original. Formatting of this note might be different from the original. 02/04/2010 ASCUS/HPV Negative.   10/31/2012 ASCUS/HPV Negative   08/23/2019 ASCUS/HPV Negative   Plan: Pap/HPV due 8/2020      IBS (irritable bowel syndrome) 06/01/2015     Priority: Medium    Generalized anxiety disorder 08/05/2011      Priority: Medium     [facility]   Sertraline 75mg  And ativan PRN         Past Medical History:   Diagnosis Date    Arthritis 2020    Joint pain in fingers, ankles, back, wrists, shoukders, etc    Atypical squamous cells of undetermined significance (ASCUS) on Papanicolaou smear of cervix     Formatting of this note might be different from the original.  02/04/2010 ASCUS/HPV Negative.    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    Depressive disorder 5/2023    Major depressive elisode and anxiety    Hydronephrosis     Lymphadenopathy 01/20/2020     Past Surgical History:   Procedure Laterality Date    ABDOMEN SURGERY  2019 and 2029    Hysterectomy and tumour removal - BL oopherectomy    BREAST SURGERY  2007    Breast augmentation    COLONOSCOPY  2021    Polyps removed-repeat in 5 years    GENITOURINARY SURGERY  2001    Tibal ligation    HYSTERECTOMY VAGINAL, BILATERAL SALPINGO-OOPHERECTOMY, COMBINED      December 2019- and Feb 2020    HYSTERECTOMY, PAP NO LONGER INDICATED      at 45yo     Current Outpatient Medications   Medication Sig Dispense Refill    acetaminophen (TYLENOL) 500 MG tablet Take 500-1,000 mg by mouth every 6 hours as needed for mild pain      Atogepant (QULIPTA) 60 MG TABS Take 60 mg by mouth daily as needed Samples from ENT/allergy      buPROPion (WELLBUTRIN XL) 300 MG 24 hr tablet [facility]      cyclobenzaprine (FLEXERIL) 10 MG tablet Take 1 tablet (10 mg) by mouth nightly as needed for muscle spasms 30 tablet 3    gabapentin (NEURONTIN) 100 MG capsule Take 2 capsules (200 mg) by mouth every morning AND 3 capsules (300 mg) at bedtime. If you experience side effects decrease to last tolerated dose and contact the clinic.  If not receiving desired effect contact the clinic.. 150 capsule 3    ibuprofen  "(ADVIL/MOTRIN) 200 MG tablet Take 200 mg by mouth every 4 hours as needed for pain      LORazepam (ATIVAN) 0.5 MG tablet Take 0.5 mg by mouth as needed for anxiety [facility]      meclizine (ANTIVERT) 25 MG tablet Take 1 tablet (25 mg) by mouth 3 times daily as needed for dizziness 20 tablet 1    ondansetron (ZOFRAN) 4 MG tablet Take 1 tablet (4 mg) by mouth every 8 hours as needed for nausea or vomiting 30 tablet 4    phentermine 15 MG capsule Take 1 capsule by mouth in the morning 30 capsule 0    sertraline (ZOLOFT) 50 MG tablet Take 75 mg by mouth daily [facility]         Allergies   Allergen Reactions    Ciprofloxacin Dizziness and Unknown     Dizziness        Social History     Tobacco Use    Smoking status: Never     Passive exposure: Past    Smokeless tobacco: Never   Substance Use Topics    Alcohol use: Yes     Comment: Occasional 2x month       History   Drug Use Unknown               Objective    /74   Pulse 56   Temp 97.7  F (36.5  C) (Oral)   Resp 16   Ht 1.651 m (5' 5\")   Wt 79.6 kg (175 lb 6.4 oz)   LMP  (LMP Unknown)   SpO2 97%   BMI 29.19 kg/m     Estimated body mass index is 29.19 kg/m  as calculated from the following:    Height as of this encounter: 1.651 m (5' 5\").    Weight as of this encounter: 79.6 kg (175 lb 6.4 oz).  Physical Exam  GENERAL: alert and no distress  EYES: Eyes grossly normal to inspection, PERRL and conjunctivae and sclerae normal  HENT: ear canals and TM's normal, nose and mouth without ulcers or lesions  NECK: no adenopathy, no asymmetry, masses, or scars  RESP: lungs clear to auscultation - no rales, rhonchi or wheezes  CV: regular rate and rhythm, normal S1 S2, no S3 or S4, no murmur, click or rub, no peripheral edema  ABDOMEN: soft, LLQ tender, no CVA tenderness, no hepatosplenomegaly, no masses and bowel sounds normal  MS: no gross musculoskeletal defects noted, no " edema  SKIN: no suspicious lesions or rashes  NEURO: Normal strength and tone, mentation intact and speech normal  PSYCH: mentation appears normal, affect normal/bright    Recent Labs   Lab Test 02/19/24  1143 10/12/23  0836   HGB 14.6  --      --     141   POTASSIUM 4.6 4.8   CR 1.01* 1.00*   A1C 5.6  --         Diagnostics  Recent Results (from the past 24 hour(s))   CBC with platelets    Collection Time: 10/04/24  9:48 AM   Result Value Ref Range    WBC Count 4.1 4.0 - 11.0 10e3/uL    RBC Count 4.69 3.80 - 5.20 10e6/uL    Hemoglobin 13.9 11.7 - 15.7 g/dL    Hematocrit 41.8 35.0 - 47.0 %    MCV 89 78 - 100 fL    MCH 29.6 26.5 - 33.0 pg    MCHC 33.3 31.5 - 36.5 g/dL    RDW 11.6 10.0 - 15.0 %    Platelet Count 221 150 - 450 10e3/uL      No EKG required, no history of coronary heart disease, significant arrhythmia, peripheral arterial disease or other structural heart disease.    Revised Cardiac Risk Index (RCRI)  The patient has the following serious cardiovascular risks for perioperative complications:   - No serious cardiac risks = 0 points     RCRI Interpretation: 0 points: Class I (very low risk - 0.4% complication rate)         Signed Electronically by: ADITYA Crain CNP  A copy of this evaluation report is provided to the requesting physician.

## 2024-10-08 NOTE — RESULT ENCOUNTER NOTE
Slight improvement seen in your creatinine level -please be sure to continue adequate hydration and avoid NSAIDs such as ibuprofen, naproxen, Advil, Aleve.    Your CBC (complete blood count) which looks at your hemoglobin and other blood cell types looks good- no concerns for anemia or infection.      ADITYA Crain CNP on 10/7/2024 at 7:13 PM

## 2024-10-10 NOTE — PROGRESS NOTES
DISCHARGE  Reason for Discharge: Patient has failed to schedule further appointments. Was progressing well through 11 [facility] visits.     Discharge Plan: Patient to continue home program.    Referring Provider:  Yi Ramirez

## 2024-10-16 ENCOUNTER — LAB (OUTPATIENT)
Dept: LAB | Facility: CLINIC | Age: 49
End: 2024-10-16
Payer: COMMERCIAL

## 2024-10-16 ENCOUNTER — TELEPHONE (OUTPATIENT)
Dept: UROLOGY | Facility: CLINIC | Age: 49
End: 2024-10-16

## 2024-10-16 DIAGNOSIS — R39.89 SUSPECTED URINARY TRACT INFECTION: ICD-10-CM

## 2024-10-16 LAB
ALBUMIN UR-MCNC: NEGATIVE MG/DL
APPEARANCE UR: CLEAR
BILIRUB UR QL STRIP: NEGATIVE
COLOR UR AUTO: YELLOW
GLUCOSE UR STRIP-MCNC: NEGATIVE MG/DL
HGB UR QL STRIP: NEGATIVE
KETONES UR STRIP-MCNC: NEGATIVE MG/DL
LEUKOCYTE ESTERASE UR QL STRIP: NEGATIVE
NITRATE UR QL: NEGATIVE
PH UR STRIP: 6 [PH] (ref 5–8)
RBC #/AREA URNS AUTO: NORMAL /HPF
SP GR UR STRIP: 1.01 (ref 1–1.03)
UROBILINOGEN UR STRIP-ACNC: 0.2 E.U./DL
WBC #/AREA URNS AUTO: NORMAL /HPF

## 2024-10-16 PROCEDURE — 87086 URINE CULTURE/COLONY COUNT: CPT

## 2024-10-16 PROCEDURE — 81001 URINALYSIS AUTO W/SCOPE: CPT

## 2024-10-16 NOTE — TELEPHONE ENCOUNTER
Patient notified about UA and no need to bring C-pap machine for same day surgery.  Will check UC on Friday    Yumiko Hill RN, BSN  Care Coordinator Urology  [facility] [place]  Urology Clinic  [phone]

## 2024-10-17 LAB — BACTERIA UR CULT: NO GROWTH

## 2024-10-18 ENCOUNTER — HOSPITAL ENCOUNTER (OUTPATIENT)
Dept: CT IMAGING | Facility: CLINIC | Age: 49
Discharge: HOME | End: 2024-10-18
Attending: NURSE PRACTITIONER | Admitting: NURSE PRACTITIONER
Payer: COMMERCIAL

## 2024-10-18 ENCOUNTER — ANESTHESIA EVENT (OUTPATIENT)
Dept: SURGERY | Facility: AMBULATORY SURGERY CENTER | Age: 49
End: 2024-10-18
Payer: COMMERCIAL

## 2024-10-18 DIAGNOSIS — R35.0 URINARY FREQUENCY: ICD-10-CM

## 2024-10-18 DIAGNOSIS — R10.31 RIGHT LOWER QUADRANT PAIN: ICD-10-CM

## 2024-10-18 DIAGNOSIS — R14.0 BLOATING: ICD-10-CM

## 2024-10-18 DIAGNOSIS — Z85.42 HISTORY OF ENDOMETRIAL CANCER: ICD-10-CM

## 2024-10-18 PROCEDURE — 74177 CT ABD & PELVIS W/CONTRAST: CPT

## 2024-10-18 PROCEDURE — 250N000011 HC RX IP 250 OP 636: Performed by: NURSE PRACTITIONER

## 2024-10-18 RX ORDER — IOPAMIDOL 755 MG/ML
90 INJECTION, SOLUTION INTRAVASCULAR ONCE
Status: COMPLETED | OUTPATIENT
Start: 2024-10-18 | End: 2024-10-18

## 2024-10-18 RX ADMIN — IOPAMIDOL 90 ML: 755 INJECTION, SOLUTION INTRAVENOUS at 17:11

## 2024-10-20 NOTE — ANESTHESIA PREPROCEDURE EVALUATION
Anesthesia Pre-Procedure Evaluation    Patient: Orquidea Arevalo   MRN: [id number] : 1975        Procedure : Procedure(s):  RETROPUBIC MIDURETHRAL SLING, CYSTOSCOPY          Past Medical History:   Diagnosis Date    Arthritis 2020    Joint pain in fingers, ankles, back, wrists, shoukders, etc    Atypical squamous cells of undetermined significance (ASCUS) on Papanicolaou smear of cervix     Formatting of this note might be different from the original.  2010 ASCUS/HPV Negative.    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    Depressive disorder 2023    Major depressive elisode and anxiety    Hydronephrosis     Lymphadenopathy 2020      Past Surgical History:   Procedure Laterality Date    ABDOMEN SURGERY   and     Hysterectomy and tumour removal - BL oopherectomy    BREAST SURGERY      Breast augmentation    COLONOSCOPY      Polyps removed-repeat in 5 years    GENITOURINARY SURGERY      Tibal ligation    HYSTERECTOMY VAGINAL, BILATERAL SALPINGO-OOPHERECTOMY, COMBINED      2019- and 2020    HYSTERECTOMY, PAP NO LONGER INDICATED      at 45yo      Allergies   Allergen Reactions    Ciprofloxacin Dizziness and Unknown     Dizziness      Social History     Tobacco Use    Smoking status: Never     Passive exposure: Past    Smokeless tobacco: Never   Substance Use Topics    Alcohol use: Yes     Comment: Occasional 2x month      Wt Readings from Last 1 Encounters:   10/04/24 79.6 kg (175 lb 6.4 oz)           Physical Exam    Airway        Mallampati: I   TM distance: > 3 FB   Neck ROM: full   Mouth opening: > 3 cm    Respiratory Devices and Support         Dental       (+) Minor Abnormalities - some fillings, tiny chips      Cardiovascular   cardiovascular exam normal          Pulmonary   pulmonary exam normal                OUTSIDE LABS:  CBC:   Lab Results  "  Component Value Date    WBC 4.1 10/04/2024    WBC 5.7 02/19/2024    HGB 13.9 10/04/2024    HGB 14.6 02/19/2024    HCT 41.8 10/04/2024    HCT 44.2 02/19/2024     10/04/2024     02/19/2024     BMP:   Lab Results   Component Value Date     10/04/2024     02/19/2024    POTASSIUM 4.9 10/04/2024    POTASSIUM 4.6 02/19/2024    CHLORIDE 102 10/04/2024    CHLORIDE 104 02/19/2024    CO2 28 10/04/2024    CO2 28 02/19/2024    BUN 15.0 10/04/2024    BUN 11.5 02/19/2024    CR 0.98 (H) 10/04/2024    CR 1.01 (H) 02/19/2024     (H) 10/04/2024     (H) 02/19/2024     COAGS:   Lab Results   Component Value Date    INR 0.9 06/06/2022     POC: No results found for: \"BGM\", \"HCG\", \"HCGS\"  HEPATIC:   Lab Results   Component Value Date    ALBUMIN 4.6 02/19/2024    PROTTOTAL 7.4 02/19/2024    ALT 58 (H) 02/19/2024    AST 39 02/19/2024    ALKPHOS 107 02/19/2024    BILITOTAL 0.2 02/19/2024     OTHER:   Lab Results   Component Value Date    PH 6.5 09/09/2008    A1C 5.6 02/19/2024    MIQUEL 9.2 10/04/2024    TSH 2.29 02/19/2024    SED 8 10/12/2023       Anesthesia Plan    ASA Status:  3    NPO Status:  NPO Appropriate    Anesthesia Type: General.     - Airway: LMA   Induction: Intravenous, Propofol.   Maintenance: Balanced.        Consents    Anesthesia Plan(s) and associated risks, benefits, and realistic alternatives discussed. Questions answered and patient/representative(s) expressed understanding.     - Discussed: Risks, Benefits and Alternatives for BOTH SEDATION and the PROCEDURE were discussed     - Discussed with:  Patient      - Extended Intubation/Ventilatory Support Discussed: No.      - Patient is DNR/DNI Status: No     Use of blood products discussed: No .     Postoperative Care    Pain management: IV analgesics, Oral pain medications, Multi-modal analgesia.   PONV prophylaxis: Dexamethasone or Solumedrol, Ondansetron (or other 5HT-3), Background Propofol Infusion     Comments:               " "Luiz Silva MD    I have reviewed the pertinent notes and labs in the chart from the past 30 days and (re)examined the patient.  Any updates or changes from those notes are reflected in this note.                       # Overweight: Estimated body mass index is 29.19 kg/m  as calculated from the following:    Height as of 10/4/24: 1.651 m (5' 5\").    Weight as of 10/4/24: 79.6 kg (175 lb 6.4 oz).             "

## 2024-10-21 ENCOUNTER — ANESTHESIA (OUTPATIENT)
Dept: SURGERY | Facility: AMBULATORY SURGERY CENTER | Age: 49
End: 2024-10-21
Payer: COMMERCIAL

## 2024-10-21 ENCOUNTER — HOSPITAL ENCOUNTER (OUTPATIENT)
Facility: AMBULATORY SURGERY CENTER | Age: 49
Discharge: HOME | End: 2024-10-21
Attending: UROLOGY
Payer: COMMERCIAL

## 2024-10-21 VITALS
WEIGHT: 175 LBS | DIASTOLIC BLOOD PRESSURE: 86 MMHG | HEART RATE: 54 BPM | HEIGHT: 65 IN | BODY MASS INDEX: 29.16 KG/M2 | SYSTOLIC BLOOD PRESSURE: 125 MMHG | TEMPERATURE: 97.1 F | OXYGEN SATURATION: 94 % | RESPIRATION RATE: 15 BRPM

## 2024-10-21 DIAGNOSIS — Z98.890 HISTORY OF SUBURETHRAL SLING PROCEDURE: ICD-10-CM

## 2024-10-21 DIAGNOSIS — N39.46 MIXED STRESS AND URGE URINARY INCONTINENCE: Primary | ICD-10-CM

## 2024-10-21 PROCEDURE — 57288 REPAIR BLADDER DEFECT: CPT | Mod: GC | Performed by: UROLOGY

## 2024-10-21 PROCEDURE — 57288 REPAIR BLADDER DEFECT: CPT | Performed by: UROLOGY

## 2024-10-21 PROCEDURE — 51992 LAPARO SLING OPERATION: CPT | Performed by: STUDENT IN AN ORGANIZED HEALTH CARE EDUCATION/TRAINING PROGRAM

## 2024-10-21 PROCEDURE — 51992 LAPARO SLING OPERATION: CPT | Performed by: NURSE ANESTHETIST, CERTIFIED REGISTERED

## 2024-10-21 DEVICE — MESH SLING ADVANTAGE FIT SYSTEM M0068502110: Type: IMPLANTABLE DEVICE | Site: VAGINA | Status: FUNCTIONAL

## 2024-10-21 RX ORDER — CEFAZOLIN SODIUM 2 G/50ML
2 SOLUTION INTRAVENOUS
Status: COMPLETED | OUTPATIENT
Start: 2024-10-21 | End: 2024-10-21

## 2024-10-21 RX ORDER — OXYCODONE HYDROCHLORIDE 5 MG/1
5 TABLET ORAL ONCE
Status: COMPLETED | OUTPATIENT
Start: 2024-10-21 | End: 2024-10-21

## 2024-10-21 RX ORDER — SODIUM CHLORIDE, SODIUM LACTATE, POTASSIUM CHLORIDE, CALCIUM CHLORIDE 600; 310; 30; 20 MG/100ML; MG/100ML; MG/100ML; MG/100ML
INJECTION, SOLUTION INTRAVENOUS CONTINUOUS
Status: DISCONTINUED | OUTPATIENT
Start: 2024-10-21 | End: 2024-10-21 | Stop reason: HOSPADM

## 2024-10-21 RX ORDER — LIDOCAINE 40 MG/G
CREAM TOPICAL
Status: DISCONTINUED | OUTPATIENT
Start: 2024-10-21 | End: 2024-10-21 | Stop reason: HOSPADM

## 2024-10-21 RX ORDER — PROPOFOL 10 MG/ML
INJECTION, EMULSION INTRAVENOUS CONTINUOUS PRN
Status: DISCONTINUED | OUTPATIENT
Start: 2024-10-21 | End: 2024-10-21

## 2024-10-21 RX ORDER — DEXAMETHASONE SODIUM PHOSPHATE 4 MG/ML
INJECTION, SOLUTION INTRA-ARTICULAR; INTRALESIONAL; INTRAMUSCULAR; INTRAVENOUS; SOFT TISSUE PRN
Status: DISCONTINUED | OUTPATIENT
Start: 2024-10-21 | End: 2024-10-21

## 2024-10-21 RX ORDER — HYDROXYZINE HYDROCHLORIDE 25 MG/1
25 TABLET, FILM COATED ORAL
Status: DISCONTINUED | OUTPATIENT
Start: 2024-10-21 | End: 2024-10-22 | Stop reason: HOSPADM

## 2024-10-21 RX ORDER — OXYCODONE HYDROCHLORIDE 5 MG/1
5 TABLET ORAL EVERY 6 HOURS PRN
Qty: 8 TABLET | Refills: 0 | Status: SHIPPED | OUTPATIENT
Start: 2024-10-21 | End: 2024-10-24

## 2024-10-21 RX ORDER — ACETAMINOPHEN 325 MG/1
975 TABLET ORAL ONCE
Status: COMPLETED | OUTPATIENT
Start: 2024-10-21 | End: 2024-10-21

## 2024-10-21 RX ORDER — PROPOFOL 10 MG/ML
INJECTION, EMULSION INTRAVENOUS PRN
Status: DISCONTINUED | OUTPATIENT
Start: 2024-10-21 | End: 2024-10-21

## 2024-10-21 RX ORDER — CEFAZOLIN SODIUM 2 G/50ML
2 SOLUTION INTRAVENOUS SEE ADMIN INSTRUCTIONS
Status: DISCONTINUED | OUTPATIENT
Start: 2024-10-21 | End: 2024-10-21 | Stop reason: HOSPADM

## 2024-10-21 RX ORDER — OXYCODONE HYDROCHLORIDE 5 MG/1
5 TABLET ORAL
Status: COMPLETED | OUTPATIENT
Start: 2024-10-21 | End: 2024-10-21

## 2024-10-21 RX ORDER — NALOXONE HYDROCHLORIDE 0.4 MG/ML
0.1 INJECTION, SOLUTION INTRAMUSCULAR; INTRAVENOUS; SUBCUTANEOUS
Status: DISCONTINUED | OUTPATIENT
Start: 2024-10-21 | End: 2024-10-22 | Stop reason: HOSPADM

## 2024-10-21 RX ORDER — LIDOCAINE HYDROCHLORIDE 20 MG/ML
INJECTION, SOLUTION INFILTRATION; PERINEURAL PRN
Status: DISCONTINUED | OUTPATIENT
Start: 2024-10-21 | End: 2024-10-21

## 2024-10-21 RX ORDER — DEXAMETHASONE SODIUM PHOSPHATE 10 MG/ML
4 INJECTION, SOLUTION INTRAMUSCULAR; INTRAVENOUS
Status: DISCONTINUED | OUTPATIENT
Start: 2024-10-21 | End: 2024-10-22 | Stop reason: HOSPADM

## 2024-10-21 RX ORDER — ACETAMINOPHEN 325 MG/1
975 TABLET ORAL ONCE
Status: DISCONTINUED | OUTPATIENT
Start: 2024-10-21 | End: 2024-10-21 | Stop reason: HOSPADM

## 2024-10-21 RX ORDER — METHOCARBAMOL 750 MG/1
750 TABLET, FILM COATED ORAL
Status: DISCONTINUED | OUTPATIENT
Start: 2024-10-21 | End: 2024-10-22 | Stop reason: HOSPADM

## 2024-10-21 RX ORDER — ONDANSETRON 4 MG/1
4 TABLET, ORALLY DISINTEGRATING ORAL
Status: DISCONTINUED | OUTPATIENT
Start: 2024-10-21 | End: 2024-10-22 | Stop reason: HOSPADM

## 2024-10-21 RX ORDER — ONDANSETRON 2 MG/ML
4 INJECTION INTRAMUSCULAR; INTRAVENOUS EVERY 30 MIN PRN
Status: DISCONTINUED | OUTPATIENT
Start: 2024-10-21 | End: 2024-10-22 | Stop reason: HOSPADM

## 2024-10-21 RX ORDER — OXYCODONE HYDROCHLORIDE 5 MG/1
10 TABLET ORAL
Status: DISCONTINUED | OUTPATIENT
Start: 2024-10-21 | End: 2024-10-22 | Stop reason: HOSPADM

## 2024-10-21 RX ORDER — ACETAMINOPHEN 650 MG/1
650 SUPPOSITORY RECTAL ONCE
Status: COMPLETED | OUTPATIENT
Start: 2024-10-21 | End: 2024-10-21

## 2024-10-21 RX ORDER — ONDANSETRON 2 MG/ML
INJECTION INTRAMUSCULAR; INTRAVENOUS PRN
Status: DISCONTINUED | OUTPATIENT
Start: 2024-10-21 | End: 2024-10-21

## 2024-10-21 RX ORDER — LIDOCAINE HYDROCHLORIDE 20 MG/ML
JELLY TOPICAL
Status: COMPLETED | OUTPATIENT
Start: 2024-10-21 | End: 2024-10-21

## 2024-10-21 RX ORDER — OXYCODONE HCL 10 MG/1
10 TABLET, FILM COATED, EXTENDED RELEASE ORAL EVERY 12 HOURS
Status: CANCELLED | OUTPATIENT
Start: 2024-10-21

## 2024-10-21 RX ORDER — FENTANYL CITRATE 50 UG/ML
INJECTION, SOLUTION INTRAMUSCULAR; INTRAVENOUS PRN
Status: DISCONTINUED | OUTPATIENT
Start: 2024-10-21 | End: 2024-10-21

## 2024-10-21 RX ORDER — BUPIVACAINE HYDROCHLORIDE AND EPINEPHRINE 2.5; 5 MG/ML; UG/ML
INJECTION, SOLUTION INFILTRATION; PERINEURAL PRN
Status: DISCONTINUED | OUTPATIENT
Start: 2024-10-21 | End: 2024-10-21 | Stop reason: HOSPADM

## 2024-10-21 RX ORDER — ONDANSETRON 4 MG/1
4 TABLET, ORALLY DISINTEGRATING ORAL EVERY 30 MIN PRN
Status: DISCONTINUED | OUTPATIENT
Start: 2024-10-21 | End: 2024-10-22 | Stop reason: HOSPADM

## 2024-10-21 RX ORDER — OXYCODONE HYDROCHLORIDE 5 MG/1
5 TABLET ORAL
Status: DISCONTINUED | OUTPATIENT
Start: 2024-10-21 | End: 2024-10-22 | Stop reason: HOSPADM

## 2024-10-21 RX ADMIN — SODIUM CHLORIDE, SODIUM LACTATE, POTASSIUM CHLORIDE, CALCIUM CHLORIDE: 600; 310; 30; 20 INJECTION, SOLUTION INTRAVENOUS at 08:42

## 2024-10-21 RX ADMIN — PROPOFOL 250 MG: 10 INJECTION, EMULSION INTRAVENOUS at 08:52

## 2024-10-21 RX ADMIN — FENTANYL CITRATE 50 MCG: 50 INJECTION, SOLUTION INTRAMUSCULAR; INTRAVENOUS at 09:12

## 2024-10-21 RX ADMIN — ACETAMINOPHEN 975 MG: 325 TABLET ORAL at 08:00

## 2024-10-21 RX ADMIN — OXYCODONE HYDROCHLORIDE 5 MG: 5 TABLET ORAL at 12:04

## 2024-10-21 RX ADMIN — PROPOFOL 200 MCG/KG/MIN: 10 INJECTION, EMULSION INTRAVENOUS at 08:52

## 2024-10-21 RX ADMIN — OXYCODONE HYDROCHLORIDE 5 MG: 5 TABLET ORAL at 10:21

## 2024-10-21 RX ADMIN — LIDOCAINE HYDROCHLORIDE 100 MG: 20 INJECTION, SOLUTION INFILTRATION; PERINEURAL at 08:52

## 2024-10-21 RX ADMIN — FENTANYL CITRATE 50 MCG: 50 INJECTION, SOLUTION INTRAMUSCULAR; INTRAVENOUS at 08:52

## 2024-10-21 RX ADMIN — PROPOFOL 125 MCG/KG/MIN: 10 INJECTION, EMULSION INTRAVENOUS at 09:22

## 2024-10-21 RX ADMIN — CEFAZOLIN SODIUM 2 G: 2 SOLUTION INTRAVENOUS at 08:42

## 2024-10-21 RX ADMIN — ONDANSETRON 4 MG: 2 INJECTION INTRAMUSCULAR; INTRAVENOUS at 09:24

## 2024-10-21 RX ADMIN — DEXAMETHASONE SODIUM PHOSPHATE 4 MG: 4 INJECTION, SOLUTION INTRA-ARTICULAR; INTRALESIONAL; INTRAMUSCULAR; INTRAVENOUS; SOFT TISSUE at 08:52

## 2024-10-21 RX ADMIN — LIDOCAINE HYDROCHLORIDE: 20 JELLY TOPICAL at 13:23

## 2024-10-21 NOTE — PROGRESS NOTES
October 21, 2024    Patient seen in the preoperative area with her spouse.  She denies any changes in her health since last visit.  Allergies confirmed.  Procedure and its risks again discussed.   We also acknowledged her prior cancer surgery and a diagnosis of anti-c red cell antibody.  We discussed that the risks to the procedure include but not limited to cardiovascular risks of anesthesia, nerve injury, bleeding, infection, injury to adjacent organs including bowel, bladder, and blood vessels, persistent stress incontinence, persistent or worsening urge incontinence, need for post-operative belle catheter, need for further procedures including for mesh extrusion or erosion.  Patient expressed understanding and agreeable to proceed.  At this time questions answered and consents signed.  Plan to proceed as scheduled.    Basia Tavares MD MPH  (she/her/hers)   of Urology  [facility]

## 2024-10-21 NOTE — DISCHARGE INSTRUCTIONS
Discharge instructions after sling placement:     What to Expect:   - There will likely be pain over the incision sites on your pubic region. Your incision will be typically be painful for the first 2 weeks.   - Use narcotic pain medications as needed for moderate to severe pain. Wean yourself from narcotics as able once pain is more manageable.   - If pain is mild, simply take Tylenol.   - Take stool softeners (Senna) to prevent constipation associated with narcotic medications. Your bowels may be sluggish after surgery and pain medication can increase constipation.     Diet:   - You may return to your normal diet that you were taking before surgery.     Activity Restrictions:   - No lifting, pushing, or pulling more than 10 pounds for 3 to 4 weeks while your incisions are healing.   - No strenuous exercising for 3 to 4 weeks.   - Do not drive while taking narcotics.   - Avoid any vaginal penetration for 3-4 weeks while your incisions are healing.     Incision Care:   - You have two small puncture incisions on your pubic region which are covered with skin glue. This can be left open to air.   - You also have an incision in your vagina. This is closed with dissolvable sutures.   - You will not need any specific bandage on this area, but you may find wearing a small pad in your underwear can help protect from blood staining your underwear.   - You can shower and let the water run over all the incisions. You should not scrub with soap. No soaking incision in water, such as bathing or swimming for 4 weeks.     Medications:   1. Oxycodone - this is a narcotic pain medication which you should only need for two to three days after surgery.   2. Senna - this is a stool softener. The surgery and pain medicationscan lead to constipation. You can stop this or reduce it if you are having frequent loose stools. Other over the counter solutions such as prune juice, miralax, and fiber products.   3. Resume all other home  "medications.    Monitor: Call sooner or go the emergency department if you develop fevers, chills, uncontrolled pain, nausea or vomiting, or increased redness or drainage from the incision site.     Follow-up:   Follow up as scheduled on 11/07/2024 with Dr. Tavares.    Phone numbers:   - Monday through Friday 8am to 4:30pm: Call [phone] with questions, requests for medication refills, or to schedule or confirm an appointment.   - Nights or weekends: call the after hours emergency pager - [phone] and tell the  \"I would like to page the Urology Resident on call.\" Please note, due to prescribing laws, resident physicians are unable to prescribe narcotics after-hours. If you feel as though you will need a refill of a narcotic pain medication, you will need to call the clinic during business hours OR seek emergency care.   - For emergencies, call 911.        .[facility]  Home Care Following Anesthesia  For 24 hours after surgery:  Get plenty of rest.  A responsible adult must stay with you for at least 24 hours after you leave the surgery center.  Do not drive or use heavy equipment.  If you have weakness or tingling, don't drive or use heavy equipment until this feeling goes away.   Do not drink alcohol.   Avoid strenuous or risky activities.  Ask for help when climbing stairs.  You may feel lightheaded.  IF so, sit for a few minutes before standing.  Have someone help you get up.   If you have nausea (feel sick to your stomach): Drink only clear liquids such as apple juice, ginger ale, broth or 7-Up.  Rest may also help.  Be sure to drink enough fluids.  Move to a regular diet as you feel able.   You may have a slight fever.  Call the doctor if your fever is over 100 F (37.7 C) (taken under the tongue) or lasts longer than 24 hours.  You may have a dry mouth, a sore throat, muscle aches or trouble sleeping. These should go away after 24 hours.  Do not make " "important or legal decisions.   It is recommended to avoid smoking.        Today you received a Marcaine or bupivacaine block to numb the nerves near your surgery site.  This is a block using local anesthetic or \"numbing\" medication injected around the nerves to anesthetize or \"numb\" the area supplied by those nerves.  This block is injected into the muscle layer near your surgical site.  The medication may numb the location where you had surgery for 6-18 hours, but may last up to 24 hours.  If your surgical site is an arm or leg you should be careful with your affected limb, since it is possible to injure your limb without being aware of it due to the numbing.  Until full feeling returns, you should guard against bumping or hitting your limb, and avoid extreme hot or cold temperatures on the skin.  As the block wears off, the feeling will return as a tingling or prickly sensation near your surgical site.  You will experience more discomfort from your incision as the feeling returns.  You may want to take a pain pill (a narcotic or Tylenol if this was prescribed by your surgeon) when you start to experience mild pain before the pain beccomes more severe.  If your pain medications do not control your pain you should notifiy your surgeon.    Tips for taking pain medications  To get the best pain relief possible, remember these points:  Take pain medications as directed, before pain becomes severe.  Pain medication can upset your stomach: taking it with food may help.  Constipation is a common side effect of pain medication. Drink plenty of  fluids.  Eat foods high in fiber. Take a stool softener if recommended by your doctor or pharmacist.  Do not drink alcohol, drive or operate machinery while taking pain medications.  Ask about other ways to control pain, such as with heat, ice or relaxation.    Tylenol/Acetaminophen Consumption    If you feel your pain relief is insufficient, you may take Tylenol/Acetaminophen in " addition to your narcotic pain medication.   Be careful not to exceed 4,000 mg of Tylenol/Acetaminophen in a 24 hour period from all sources.  If you are taking extra strength Tylenol/acetaminophen (500 mg), the maximum dose is 8 tablets in 24 hours.  If you are taking regular strength acetaminophen (325 mg), the maximum dose is 12 tablets in 24 hours.  Tylenol 975 mg given at 8:00 am.   Ok to take more after 2:00 pm.      Call a doctor for any of the following:  Signs of infection (fever, growing tenderness at the surgery site, a large amount of drainage or bleeding, severe pain, foul-smelling drainage, redness, swelling).  It has been over 8 to 10 hours since surgery and you are still not able to urinate (pass water).  Headache for over 24 hours.  Numbness, tingling or weakness the day after surgery (if you had spinal anesthesia).  Signs of Covid-19 infection (temperature over 100 degrees, shortness of breath, cough, loss of taste/smell, generalized body aches, persistent headache, chills, sore throat, nausea/vomiting/diarrhea)  Your doctor is:  Dr. Basia Tavares, Prostate and Urology: [phone]                    Or dial [phone] and ask for the resident on call for:  Prostate Urology  For emergency care, call the:  [facility]:  [phone] (TTY for hearing impaired: [phone])

## 2024-10-21 NOTE — OP NOTE
October 21, 2024    PREOPERATIVE DIAGNOSES:   Mixed urinary incontinence  Overactive bladder  High tone pelvic floor  Urodynamic stress incontinence  History of endometrial sarcoma s/p hysterectomy    Stress urinary incontinence    POSTOPERATIVE DIAGNOSES: Stress urinary incontinence    PROCEDURE PERFORMED:   1. Retropubic mid-urethral sling   2.Cystoscopy.     ATTENDING PHYSICIAN: Basia Tavares MD     RESIDENT(S): Thalia Ambriz MD    ANESTHESIA: General with LMA    ESTIMATED BLOOD LOSS: 10  mL    SPECIMENS:  None    HISTORY OF PRESENT ILLNESS: Orquidea Arevalo is a(n) 48 year old female with a history of stress urinary incontinence. Differing management options were discussed with the patient and she has elected to proceed with a mid urethral sling.    PROCEDURE IN DETAIL: Following informed written consent, the patient was transported to the operating room.  A time out was held to correctly identify the patient, procedure(s), and surgical location.  IV antibiotics were administered and the patient was positioned in dorsal lithotomy in supportive yellowfin stirrups with care in neural safety in positioning all four extremities.  She was then prepped and draped in the standard sterile fashion.     A cathter was inserted into her urethra to drain her bladder and 10 mL of saline used to inflate the balloon.  The belle drained clear yellow urine for the duration of the case.  The lonestar with stays were used to aid in visualization. Two sites were marked just above the pubic symphysis about 3 cm apart. A 1.5 cm saggital incision was made starting approximately 1 cm from the outer urethral meatus at the midurethral zone to allow for subsequent passage of the sling. Two small paraurethral dissections were made. With an empty bladder the Mandarin guide was placed into the bladder and used to distract the bladder neck and urethra from where the tip of the needle would pass in the retropubic space (move the urethra and  bladder neck contralateral to the side of the needle passage). The trocar was inserted first into the right paraurethral tunnel, passed paraurethrally penetrating the urogential diaphragm. The insertion and passage were controlled by the index finger in the vaginal wall and the with needle tip in the frontal plane. Once the the tip had passed thru the urogenital diaphragm the needle was kept near the midline and close to the back of the pubic bone to avoid anatomic structures in the inguinal area and lateral pelvic side wall. The tip of the needle was moved upwards towards the markings on the skin right above the pubic symphysis keeping in close contact with the pubic bone all the way. This was then repeated on the left side. Once both trocars had been passed just through the skin we performed a cystoscopy. Initial cystoscopy noted that there was a cystotomy on the right side so the bladder was emptied, the trocar removed and then re-passed. This was repeated again until cystoscopy did not demonstrate any foreign body in the bladder.    At this time the belle catheter was reinserted and the bladder was fully drained. The needles were pulled upward to bring the sling through the skin incisions. At this time the trocars were removed and the sling was positioned so that it was in the appropriate location in the midurethra without any tension. The plastic sheaths were removed and the excess polypropylene mesh was excised at the skin. The skin sites were copiously irrigated and closed with dermabond. Once appropriate hemostasis was maintained the vaginal incision was closed in two layers with 4-0 monocryl.     The patient was awoken from anesthesia & transported to the PACU in stable condition.    Thalia Ambriz MD, MPH  Urology Resident, PGY-2    Addendum:    I, Basia Tavares, was present and scrubbed for the entire case.  I agree with the note as above with changes made as needed.  I spoke to her  in the waiting  room at the end of the case    Basia Tavares MD MPH  (she/her/hers)   of Urology  [facility]

## 2024-10-21 NOTE — ANESTHESIA CARE TRANSFER NOTE
Patient: Orquidea Arevalo    Procedure: Procedure(s):  RETROPUBIC MIDURETHRAL SLING, CYSTOSCOPY       Diagnosis: Mixed stress and urge urinary incontinence [N39.46]  High-tone pelvic floor dysfunction [M62.89]  OAB (overactive bladder) [N32.81]  History of hysterectomy for cancer [Z90.710]  Diagnosis Additional Information: No value filed.    Anesthesia Type:   General     Note:    Oropharynx: oropharynx clear of all foreign objects and spontaneously breathing  Level of Consciousness: drowsy  Oxygen Supplementation: room air    Independent Airway: airway patency satisfactory and stable  Dentition: dentition unchanged  Vital Signs Stable: post-procedure vital signs reviewed and stable  Report to RN Given: handoff report given  Patient transferred to: PACU  Comments: 118/72  HR: 68  SpO2: 92%  RR: 13  Handoff Report: Identifed the Patient, Identified the Reponsible Provider, Reviewed the pertinent medical history, Discussed the surgical course, Reviewed Intra-OP anesthesia mangement and issues during anesthesia, Set expectations for post-procedure period and Allowed opportunity for questions and acknowledgement of understanding  Vitals:  Vitals Value Taken Time   /72 10/21/24 0952   Temp 36.4  C (97.5  F) 10/21/24 0952   Pulse 62 10/21/24 0954   Resp 13 10/21/24 0954   SpO2 91 % 10/21/24 0954   Vitals shown include unfiled device data.    Electronically Signed By: ADITYA Rodriguez CRNA  October 21, 2024  9:55 AM

## 2024-10-21 NOTE — ANESTHESIA POSTPROCEDURE EVALUATION
Patient: Orquidea Arevalo    Procedure: Procedure(s):  RETROPUBIC MIDURETHRAL SLING, CYSTOSCOPY       Anesthesia Type:  General    Note:  Disposition: Outpatient   Postop Pain Control: Uneventful            Sign Out: Well controlled pain   PONV: No   Neuro/Psych: Uneventful            Sign Out: Acceptable/Baseline neuro status   Airway/Respiratory: Uneventful            Sign Out: Acceptable/Baseline resp. status   CV/Hemodynamics: Uneventful            Sign Out: Acceptable CV status; No obvious hypovolemia; No obvious fluid overload   Other NRE:    DID A NON-ROUTINE EVENT OCCUR?            Last vitals:  Vitals Value Taken Time   /73 10/21/24 1030   Temp 36.5  C (97.7  F) 10/21/24 1030   Pulse 58 10/21/24 1030   Resp 12 10/21/24 1030   SpO2 95 % 10/21/24 1030       Electronically Signed By: Luiz Silva MD  October 21, 2024  10:57 AM

## 2024-10-21 NOTE — ANESTHESIA PROCEDURE NOTES
Airway       Patient location during procedure: OR  Staff -        Anesthesiologist:  Luiz Silva MD       CRNA: Rachael Dumont APRN CRNA       Performed By: CRNA  Consent for Airway        Urgency: elective  Indications and Patient Condition       Indications for airway management: bel-procedural       Induction type:intravenous       Mask difficulty assessment: 0 - not attempted    Final Airway Details       Final airway type: supraglottic airway    Supraglottic Airway Details        Type: LMA       Brand: I-Gel       LMA size: 4    Post intubation assessment        Placement verified by: capnometry, equal breath sounds and chest rise        Number of attempts at approach: 1       Number of other approaches attempted: 0       Secured with: tape       Ease of procedure: easy       Dentition: Intact and Unchanged

## 2024-10-22 ENCOUNTER — PRE VISIT (OUTPATIENT)
Dept: UROLOGY | Facility: CLINIC | Age: 49
End: 2024-10-22
Payer: COMMERCIAL

## 2024-10-22 DIAGNOSIS — Z46.6 ENCOUNTER FOR FOLEY CATHETER REMOVAL: Primary | ICD-10-CM

## 2024-10-22 RX ORDER — SULFAMETHOXAZOLE AND TRIMETHOPRIM 800; 160 MG/1; MG/1
1 TABLET ORAL ONCE
Status: COMPLETED | OUTPATIENT
Start: 2024-10-25 | End: 2024-10-25

## 2024-10-22 NOTE — TELEPHONE ENCOUNTER
Reason for nurse visit:   TOV    Transfer Assistance Needed:   No    Diagnosis:   Mixed stress and urge urinary incontinence; high-tone pelvic floor dysfunction; OAB  S/p Retropubic midurethral sling procedure 10/21/2024    Ordering provider:   Dr. Tavares - AWAITING ORDERS!    Last seen by provider:   8/13/2024       Allergies   Allergen Reactions    Ciprofloxacin Dizziness and Unknown     Dizziness        Candida Mead LPN

## 2024-10-24 NOTE — TELEPHONE ENCOUNTER
Patient confirmed scheduled appointment:  Date: 11/24  Time: 10:00  Visit type: RTN Nurse - TOV  Provider: Nurse  Location: [facility]  Testing/imaging: NA  Additional notes: NA

## 2024-10-25 ENCOUNTER — OFFICE VISIT (OUTPATIENT)
Dept: UROLOGY | Facility: CLINIC | Age: 49
End: 2024-10-25
Payer: COMMERCIAL

## 2024-10-25 DIAGNOSIS — Z46.6 ENCOUNTER FOR FOLEY CATHETER REMOVAL: Primary | ICD-10-CM

## 2024-10-25 PROCEDURE — 99207 PR MEASURE POST-VOID RESIDUAL URINE/BLADDER CAPACITY, US NON-IMAGING: CPT

## 2024-10-25 RX ADMIN — SULFAMETHOXAZOLE AND TRIMETHOPRIM 1 TABLET: 800; 160 TABLET ORAL at 10:15

## 2024-10-25 NOTE — PROGRESS NOTES
Orquidea Arevalo comes into clinic today at the request of Basia Tavares MD with the diagnosis of encounter for belle catheter removal for a voiding trial.    The following medication was given:     MEDICATION: Bactrim (Trimethoprim / Sulfamethoxazole)  ROUTE: PO  SITE: Medication was given orally  DOSE: 800mg/160mg  LOT #: [id number]  : [employer]   EXPIRATION DATE: 01/31/2026  NDC#: [id number]   Was there drug waste? No    Prior to medication administration, verified patient identity using patient's name and date of birth.  Due to medication administration, patient instructed to remain in clinic for 15 minutes  afterwards, and to report any adverse reaction to me immediately.    Approx 400 mL of sterile water instilled into the bladder via catheter.      Removal:  16 Fr straight tipped silicone belle catheter removed from urethral meatus without difficulty after removing 9 mL of fluid from the balloon, balloon intact.    Patient voided approx 400 mL of clear urine.     Post-void residual was: 0 mL per bladder scan.    Patient tolerated procedure well.      Education: Teaching done with patient verbally as to where to go or call  if unable to urinate post-catheter removal. Increase fluids.   Plan: Follow-up 11/7/2024      This service provided today was under the supervising provider of the day Keenan Murphy MD, who was available if needed.    Candida Mead LPN

## 2024-10-25 NOTE — PATIENT INSTRUCTIONS
AFTER YOUR Catheter Removal        You have just completed an indwelling catheter removal which facilitated your ability to void your bladder post-operatively.   We suggest that you continue to avoid caffeine, fruit juice, and alcohol for the next 24 hours, however, you are encouraged to return to your normal activities.         A few things that are considered normal after your catheter removal:     * Small amount of bleeding (or spotting) that clears within the next 24 hours     * Slight burning sensation with urination     * Sensation to of needing to avoid more frequently      * Mild discomfort that is relieved with Tylenol        Please contact our office promptly if you:     * Develop a fever above 101 degrees     * Are unable to urinate     * Develop bright red blood that does not stop    If you have any questions or concerns after your appointment, feel free to contact the Urology Clinic at [phone] during M-F, 8:00-5pm business hours.  If you need to speak with someone after normal business hours, call [phone] and ask for the Resident on call to be paged.

## 2024-10-29 ENCOUNTER — PATIENT OUTREACH (OUTPATIENT)
Dept: ONCOLOGY | Facility: CLINIC | Age: 49
End: 2024-10-29

## 2024-10-30 ENCOUNTER — PRE VISIT (OUTPATIENT)
Dept: UROLOGY | Facility: CLINIC | Age: 49
End: 2024-10-30
Payer: COMMERCIAL

## 2024-10-30 NOTE — TELEPHONE ENCOUNTER
Reason for visit: 2 week post-op     Relevant information: Midurethral sling placed on 10/21/24    Records/imaging/labs/orders: All records available     Pt called: No need for a call    At Rooming: Have the patient void and then PVR. Have patient undress for exam.    Rosalva Hernandez  10/30/2024  1:18 PM

## 2024-11-07 ENCOUNTER — OFFICE VISIT (OUTPATIENT)
Dept: UROLOGY | Facility: CLINIC | Age: 49
End: 2024-11-07
Payer: COMMERCIAL

## 2024-11-07 VITALS
OXYGEN SATURATION: 97 % | WEIGHT: 170 LBS | HEIGHT: 65 IN | BODY MASS INDEX: 28.32 KG/M2 | DIASTOLIC BLOOD PRESSURE: 87 MMHG | SYSTOLIC BLOOD PRESSURE: 120 MMHG | HEART RATE: 59 BPM

## 2024-11-07 DIAGNOSIS — Z90.710 HISTORY OF HYSTERECTOMY FOR CANCER: ICD-10-CM

## 2024-11-07 DIAGNOSIS — Z96.0 SUBURETHRAL SLING PRESENT: Primary | ICD-10-CM

## 2024-11-07 DIAGNOSIS — M79.18 MYALGIA OF PELVIC FLOOR: ICD-10-CM

## 2024-11-07 DIAGNOSIS — N39.46 MIXED STRESS AND URGE URINARY INCONTINENCE: ICD-10-CM

## 2024-11-07 ASSESSMENT — PAIN SCALES - GENERAL: PAINLEVEL_OUTOF10: NO PAIN (0)

## 2024-11-07 NOTE — NURSING NOTE
"Chief Complaint   Patient presents with    Follow Up     Post op          Blood pressure 120/87, pulse 59, height 1.651 m (5' 5\"), weight 77.1 kg (170 lb), SpO2 97%. Body mass index is 28.29 kg/m .    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    Disability examination    Dizziness    IBS (irritable bowel syndrome)    Migraine without aura    Tinnitus, left    Atypical squamous cells of undetermined significance (ASCUS) on Papanicolaou smear of cervix    Blood in stool    Overweight with body mass index (BMI) 25.0-29.9    Chronic neck pain    Mixed stress and urge urinary incontinence    High-tone pelvic floor dysfunction    OAB (overactive bladder)    Anti-cardiolipin antibody positive    RADHA (obstructive sleep apnea)       Allergies   Allergen Reactions    Ciprofloxacin Dizziness and Unknown     Dizziness       Current Outpatient Medications   Medication Sig Dispense Refill    acetaminophen (TYLENOL) 500 MG tablet Take 500-1,000 mg by mouth every 6 hours as needed for mild pain      Atogepant (QULIPTA) 60 MG TABS Take 60 mg by mouth daily as needed Samples from ENT/allergy      buPROPion (WELLBUTRIN XL) 300 MG 24 hr tablet [facility]      cyclobenzaprine (FLEXERIL) 10 MG tablet Take 1 tablet (10 mg) by mouth nightly as needed for muscle spasms 30 tablet 3    gabapentin (NEURONTIN) 100 MG capsule Take 2 capsules (200 mg) by mouth every morning AND 3 capsules (300 mg) at bedtime. If you experience side effects decrease to last tolerated " dose and contact the clinic.  If not receiving desired effect contact the clinic.. 150 capsule 3    ibuprofen (ADVIL/MOTRIN) 200 MG tablet Take 200 mg by mouth every 4 hours as needed for pain      LORazepam (ATIVAN) 0.5 MG tablet Take 0.5 mg by mouth as needed for anxiety [facility]      meclizine (ANTIVERT) 25 MG tablet Take 1 tablet (25 mg) by mouth 3 times daily as needed for dizziness 20 tablet 1    ondansetron (ZOFRAN) 4 MG tablet Take 1 tablet (4 mg) by mouth every 8 hours as needed for nausea or vomiting 30 tablet 4    phentermine 15 MG capsule Take 1 capsule by mouth in the morning 30 capsule 0    sertraline (ZOLOFT) 50 MG tablet Take 75 mg by mouth daily [facility]         Social History     Tobacco Use    Smoking status: Never     Passive exposure: Past    Smokeless tobacco: Never   Vaping Use    Vaping status: Never Used   Substance Use Topics    Alcohol use: Yes     Comment: Occasional 2x month    Drug use: Never       Ara Mazariegos  11/7/2024  9:59 AM

## 2024-11-07 NOTE — PROGRESS NOTES
"November 7, 2024    Raffi was seen today for follow up.    Diagnoses and all orders for this visit:    Suburethral sling present  -     POST-VOID RESIDUAL BLADDER SCAN    Mixed stress and urge urinary incontinence    History of hysterectomy for cancer    Myalgia of pelvic floor       Continue to monitor for now, recommend to avoid pushing    Released from all restrictions at 6 weeks    Return in about 3 months to reassess voiding symptoms, may need to return to PT    Basia Tavares MD MPH  (she/her/hers)   of Urology  [facility]    Subjective    Doing well overall since her midurethral sling 2 weeks ago.  Denies leakage.  She does state it takes her longer to urinate, sometimes has to double void.  She denies any changes in health since last visit    /87   Pulse 59   Ht 1.651 m (5' 5\")   Wt 77.1 kg (170 lb)   LMP  (LMP Unknown)   SpO2 97%   BMI 28.29 kg/m    GENERAL: healthy, alert and no distress  EYES: Eyes grossly normal to inspection, conjunctivae and sclerae normal  HENT: normal cephalic/atraumatic.  External ears, nose and mouth without ulcers or lesions.  RESP: no audible wheeze, cough, or visible cyanosis.  No visible retractions or increased work of breathing.  Able to speak fully in complete sentences.  NEURO: Cranial nerves grossly intact, mentation intact and speech normal  PSYCH: mentation appears normal, affect normal/bright, judgement and insight intact, normal speech and appearance well-groomed  ABD: soft, nontender, nondistended, well healed trocar incisions  : normal external genitalia, negative CST, speculum and bimanual exam are remarkable for some mild myofascial tenderness, there is no evidence of mesh extrusion     mL by bladder scan (she had voided about 30 minutes prior to being roomed)      CC  Patient Care Team:  Sheba Jane APRN CNP as PCP - General (Family Medicine)  Sheba Chowdary MD as MD (Gynecologic Oncology)  Epi, " Sheba Laird MD as Assigned Cancer Care Provider  Katie Hurd AuD (Audiology)  Effie Hanson NP as Nurse Practitioner  Holly Moreno MD as Referring Physician (Family Medicine)  Basia Tavares MD as MD (Urology)  Basia Tavares MD as Assigned Surgical Provider  Mark Pelaez MD as MD (Cardiovascular Disease)  Mark Pelaez MD as Assigned Heart and Vascular Provider  Sheba Jane APRN CNP as Assigned PCP  Rozina Dobson MD as Assigned Neuroscience Provider  Beverly Antony PA-C as Assigned Sleep Provider  Rachael Mcfarland APRN CNP as Assigned OBGYN Provider  SELF, REFERRED

## 2024-11-07 NOTE — LETTER
"11/7/2024       RE: Orquidea Arevalo  [address] [facility] [id number]     Dear Colleague,    Thank you for referring your patient, Orquidea Arevalo, to the [facility] [place] [facility]. Please see a copy of my visit note below.    November 7, 2024    Raffi was seen today for follow up.    Diagnoses and all orders for this visit:    Suburethral sling present  -     POST-VOID RESIDUAL BLADDER SCAN    Mixed stress and urge urinary incontinence    History of hysterectomy for cancer    Myalgia of pelvic floor       Continue to monitor for now, recommend to avoid pushing    Released from all restrictions at 6 weeks    Return in about 3 months to reassess voiding symptoms, may need to return to PT    Basia Tavares MD MPH  (she/her/hers)   of Urology  [facility]    Subjective    Doing well overall since her midurethral sling 2 weeks ago.  Denies leakage.  She does state it takes her longer to urinate, sometimes has to double void.  She denies any changes in health since last visit    /87   Pulse 59   Ht 1.651 m (5' 5\")   Wt 77.1 kg (170 lb)   LMP  (LMP Unknown)   SpO2 97%   BMI 28.29 kg/m    GENERAL: healthy, alert and no distress  EYES: Eyes grossly normal to inspection, conjunctivae and sclerae normal  HENT: normal cephalic/atraumatic.  External ears, nose and mouth without ulcers or lesions.  RESP: no audible wheeze, cough, or visible cyanosis.  No visible retractions or increased work of breathing.  Able to speak fully in complete sentences.  NEURO: Cranial nerves grossly intact, mentation intact and speech normal  PSYCH: mentation appears normal, affect normal/bright, judgement and insight intact, normal speech and appearance well-groomed  ABD: soft, nontender, nondistended, well healed trocar incisions  : normal external genitalia, negative CST, speculum and bimanual exam are remarkable " for some mild myofascial tenderness, there is no evidence of mesh extrusion     mL by bladder scan (she had voided about 30 minutes prior to being roomed)      CC  Patient Care Team:  Sheba Jane APRN CNP as PCP - General (Family Medicine)  Sheba Chowdary MD as MD (Gynecologic Oncology)  Sheba Chowdary MD as Assigned Cancer Care Provider  Katie Hurd AuD (Audiology)  Effie Hanson NP as Nurse Practitioner  Holly Moreno MD as Referring Physician (Family Medicine)  Basia Tavares MD as MD (Urology)  Basia Tavares MD as Assigned Surgical Provider  Mark Pelaez MD as MD (Cardiovascular Disease)  Mark Pelaez MD as Assigned Heart and Vascular Provider  Sheba Jane APRN CNP as Assigned PCP  Rozina Dobson MD as Assigned Neuroscience Provider  Beverly Antony PA-C as Assigned Sleep Provider  Rachael Mcfarland APRN CNP as Assigned OBGYN Provider  SELF, REFERRED        Again, thank you for allowing me to participate in the care of your patient.      Sincerely,    Basia Tavares MD

## 2024-11-07 NOTE — PATIENT INSTRUCTIONS
Websites with free information:    [employer] patient website: www.voicesforpfd.org    Total Control Program: www.totalcontrolprogram.com    You are released from all restrictions at 6 weeks    Return to see us in about 3 months, sooner if needed    It was a pleasure meeting with you today.  Thank you for allowing me and my team the privilege of caring for you today.  YOU are the reason we are here, and I truly hope we provided you with the excellent service you deserve.  Please let us know if there is anything else we can do for you so that we can be sure you are leaving completely satisfied with your care experience.

## 2025-01-06 ENCOUNTER — MYC MEDICAL ADVICE (OUTPATIENT)
Dept: ONCOLOGY | Facility: CLINIC | Age: 50
End: 2025-01-06
Payer: COMMERCIAL

## 2025-01-06 ENCOUNTER — TRANSFERRED RECORDS (OUTPATIENT)
Dept: HEALTH INFORMATION MANAGEMENT | Facility: CLINIC | Age: 50
End: 2025-01-06

## 2025-01-06 DIAGNOSIS — C54.1 ENDOMETRIAL STROMAL SARCOMA (H): Primary | ICD-10-CM

## 2025-01-14 ENCOUNTER — HOSPITAL ENCOUNTER (OUTPATIENT)
Dept: MAMMOGRAPHY | Facility: CLINIC | Age: 50
Discharge: HOME | End: 2025-01-14
Attending: NURSE PRACTITIONER
Payer: COMMERCIAL

## 2025-01-14 DIAGNOSIS — Z12.31 VISIT FOR SCREENING MAMMOGRAM: ICD-10-CM

## 2025-01-14 PROCEDURE — 77063 BREAST TOMOSYNTHESIS BI: CPT

## 2025-01-15 NOTE — PROGRESS NOTES
Pt left message on voice mail  Re lymphedema PT  Dx with cellulitis at [place]  Would like new order faxed to [place]  [phone]

## 2025-02-13 ENCOUNTER — MYC REFILL (OUTPATIENT)
Dept: FAMILY MEDICINE | Facility: CLINIC | Age: 50
End: 2025-02-13
Payer: COMMERCIAL

## 2025-02-13 DIAGNOSIS — R42 VERTIGO: ICD-10-CM

## 2025-02-13 RX ORDER — ONDANSETRON 4 MG/1
4 TABLET, FILM COATED ORAL EVERY 8 HOURS PRN
Qty: 30 TABLET | Refills: 4 | Status: SHIPPED | OUTPATIENT
Start: 2025-02-13

## 2025-02-13 RX ORDER — MECLIZINE HYDROCHLORIDE 25 MG/1
25 TABLET ORAL 3 TIMES DAILY PRN
Qty: 20 TABLET | Refills: 1 | Status: SHIPPED | OUTPATIENT
Start: 2025-02-13

## 2025-02-17 ENCOUNTER — E-VISIT (OUTPATIENT)
Dept: FAMILY MEDICINE | Facility: CLINIC | Age: 50
End: 2025-02-17
Payer: COMMERCIAL

## 2025-02-17 DIAGNOSIS — K58.9 IRRITABLE BOWEL SYNDROME, UNSPECIFIED TYPE: ICD-10-CM

## 2025-02-17 DIAGNOSIS — H93.13 TINNITUS, BILATERAL: Primary | ICD-10-CM

## 2025-02-17 DIAGNOSIS — R42 DIZZINESS: ICD-10-CM

## 2025-02-17 NOTE — PATIENT INSTRUCTIONS
Thank you for choosing us for your care. I have placed the below referral(s) for you:  Orders Placed This Encounter   Procedures     ENT  Referral, Adult     GI  Referral - Consult Only, Adult       Please click the link for your After Visit Summary to view scheduling instructions for your referral. In most cases, you will be contacted within 2 business days to schedule. If you do not hear from a representative within that time, please call [phone] to be connected to a .

## 2025-02-25 ENCOUNTER — TELEPHONE (OUTPATIENT)
Dept: OTOLARYNGOLOGY | Facility: CLINIC | Age: 50
End: 2025-02-25
Payer: COMMERCIAL

## 2025-02-25 NOTE — TELEPHONE ENCOUNTER
1. Have you noticed any changes in hearing? No  2. Do you have ringing, buzzing, or other sounds in your ears or head, this is also referred to as Tinnitus? Yes  3. When and where was your last hearing test? [facility]?  4. Do you feel lightheaded or foggy? Yes  5. Do you have a spinning sensation? No  6. Is there any specific position that can bring on dizziness? random  7. Does looking up cause dizziness? No  8. Does getting in and our of bed cause dizziness? Yes  9. Does turning over in bed increase or cause dizziness? Yes  10. Does bending over cause dizziness? No  11. Is there anything that you can do to prevent the dizziness? no  12. Has the dizziness gotten better with time? No  13. Have you seen Physical Therapy for dizziness? (Please indicate clinic and as much of the location as possible): Yes, If yes, where? [facility] if yes, who?   14. Are you being referred to a specific physician? No  15. Have you been evaluated/treated for your dizziness at any other location?  (If yes,obtian as much clinic/provider/locaiton as possible) Yes. (If yes answer the following questions:)   Have you seen any ENT, Neurology, or other providers for these symptoms?             Yes, If yes, where? [facility] [place] if yes, who?    Have you had any balance or Audiology testing? Yes, If yes, where? [facility] if yes, who?  Have you had an MRI or CT scan of your head or neck? Yes, If yes, where? MRI- [place]. [facility]- [facility]  if yes, who?     Would you like to receive your Release of Information by mail or e-mail?  e-mail

## 2025-03-03 ENCOUNTER — OFFICE VISIT (OUTPATIENT)
Dept: FAMILY MEDICINE | Facility: CLINIC | Age: 50
End: 2025-03-03
Attending: NURSE PRACTITIONER
Payer: COMMERCIAL

## 2025-03-03 ENCOUNTER — TELEPHONE (OUTPATIENT)
Dept: FAMILY MEDICINE | Facility: CLINIC | Age: 50
End: 2025-03-03

## 2025-03-03 VITALS
SYSTOLIC BLOOD PRESSURE: 115 MMHG | WEIGHT: 182 LBS | HEIGHT: 64 IN | BODY MASS INDEX: 31.07 KG/M2 | OXYGEN SATURATION: 96 % | DIASTOLIC BLOOD PRESSURE: 70 MMHG | HEART RATE: 65 BPM | RESPIRATION RATE: 18 BRPM | TEMPERATURE: 97.8 F

## 2025-03-03 DIAGNOSIS — K59.09 CHRONIC CONSTIPATION: ICD-10-CM

## 2025-03-03 DIAGNOSIS — R42 VERTIGO: ICD-10-CM

## 2025-03-03 DIAGNOSIS — K58.1 IRRITABLE BOWEL SYNDROME WITH CONSTIPATION: ICD-10-CM

## 2025-03-03 DIAGNOSIS — H53.143 PHOTOPHOBIA OF BOTH EYES: ICD-10-CM

## 2025-03-03 DIAGNOSIS — R14.0 ABDOMINAL BLOATING: ICD-10-CM

## 2025-03-03 DIAGNOSIS — H93.12 TINNITUS, LEFT: ICD-10-CM

## 2025-03-03 DIAGNOSIS — F44.4 FUNCTIONAL NEUROLOGICAL SYMPTOM DISORDER WITH ABNORMAL MOVEMENT: ICD-10-CM

## 2025-03-03 DIAGNOSIS — Z01.01: ICD-10-CM

## 2025-03-03 DIAGNOSIS — R11.0 NAUSEA: ICD-10-CM

## 2025-03-03 DIAGNOSIS — R33.9 URINARY RETENTION: Primary | ICD-10-CM

## 2025-03-03 LAB
ALBUMIN UR-MCNC: NEGATIVE MG/DL
APPEARANCE UR: CLEAR
BILIRUB UR QL STRIP: NEGATIVE
COLOR UR AUTO: YELLOW
GLUCOSE UR STRIP-MCNC: NEGATIVE MG/DL
HGB UR QL STRIP: NEGATIVE
KETONES UR STRIP-MCNC: NEGATIVE MG/DL
LEUKOCYTE ESTERASE UR QL STRIP: NEGATIVE
NITRATE UR QL: NEGATIVE
PH UR STRIP: 6.5 [PH] (ref 5–8)
SP GR UR STRIP: 1.01 (ref 1–1.03)
UROBILINOGEN UR STRIP-ACNC: 0.2 E.U./DL

## 2025-03-03 PROCEDURE — 99215 OFFICE O/P EST HI 40 MIN: CPT | Performed by: NURSE PRACTITIONER

## 2025-03-03 PROCEDURE — 3074F SYST BP LT 130 MM HG: CPT | Performed by: NURSE PRACTITIONER

## 2025-03-03 PROCEDURE — 3078F DIAST BP <80 MM HG: CPT | Performed by: NURSE PRACTITIONER

## 2025-03-03 PROCEDURE — 81003 URINALYSIS AUTO W/O SCOPE: CPT | Performed by: NURSE PRACTITIONER

## 2025-03-03 NOTE — TELEPHONE ENCOUNTER
JUANI - Status Update    Who is Calling: pharmacy    Update: Linzess only comes in a qty of 30 and they cannot open or split a bottle. Please update qty for a one month supply of 30 and resend rx    Medication: linaclotide (LINZESS) 72 MCG capsule    Directions:Take 1 capsule (72 mcg) by mouth every morning (before breakfast)., Disp-14 capsule     Pharmacy: [facility] [address] [place] [address] [address]

## 2025-03-03 NOTE — PROGRESS NOTES
Assessment & Plan     Disability examination  Patient presents today needing forms completed for long-term disability   Patient wanting forms completed on basis of mental health concerns, chronic pain, as well as diagnosis of conversion disorder/functional neurological symptom disorder, and now having episodes of tinnitus/ear discomfort that is followed by episodes of severe fatigue, photosensitivity, headache, vertigo, and balance issues, that occur multiple times throughout the day, that appeared to be unprovoked, and have no known trigger      Form completed- original was placed in [facility] to be scanned into her medical record, patient given physical copy    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  Patient diagnosed 11/8/2023 by ED doctor after patient having upper extremity full arm spasms bilaterally and facial spasms with known found pathologic cause    -No symptoms today on physical exam, worsening with stress, currently seeing therapy  -Consider specialist for specialized neuro PT,   -Completed previous PT and [facility] PT  -Sees acupuncture for this   alternative medication, completed DBT therapy    Major depressive disorder, single episode, moderate (H)  Continue follow-up with psychiatrist   continue follow-up with therapist  Continue group or DBT  Continue prescribed medication    Generalized anxiety disorder  Directed patient to reach out to psychiatrist for benzodiazepine refills  Doing well with current medication     Chronic bilateral low back pain without sciatica  continue follow-up with spinal specialist  Urologist currently prescribed gabapentin-patient to check with urologist on how to take this if she is taking it differently than it is prescribed    Endometrial stromal sarcoma (H)  History of hysterectomy for cancer  Status post hysterectomy  Holding letrozole for now due to increased joint pain in hands  Continue follow-up with oncology every 6 months and  imaging annually this will be with me to be faxed we can do it tomorrow  Feels her urinary symptoms are a result of this original surgery    Urinary retention (Primary)  Followed by urology and recently had a bladder sling completed in October 2024  Patient feels that she continues to have retention UA completed today to rule out UTI    - UA Macroscopic with reflex to Microscopic and Culture - Lab Collect; Future  - UA Macroscopic with reflex to Microscopic and Culture - Lab Collect    Irritable bowel syndrome with constipation   Abdominal bloating  Patient with a history of chronic constipation and abdominal bloating, symptoms worse with certain trigger foods such as onion and garlic and dairy.  She tries to avoid these foods but is not on a full low FODMAP diet nor has she done a complete 6-week washout.  She currently takes senna daily to help with bowel movements, but does not have a daily bowel movement even with this, colonoscopy completed in 2023 revealed polyps but no other abnormalities.  A prior referral has been placed for GI and she does have an appointment in April   -Given her history we will start her on Linzess low-dose then increase to 145mg after weeks  -Recommend low FODMAP diet  -Patient likely will need to taper off of senna discussed the risk of long-term use of senna    - linaclotide (LINZESS) 72 MCG capsule; Take 1 capsule (72 mcg) by mouth every morning (before breakfast).  Dispense: 14 capsule; Refill: 0  - linaclotide (LINZESS) 145 MCG capsule; Take 1 capsule (145 mcg) by mouth every morning (before breakfast).  Dispense: 30 capsule; Refill: 0       Abnormal ophthalmologic exam  Photophobia of both eyes  Patient with a left eye medial gaze with pupil reactivity exam today, extraocular motion otherwise intact  Referral placed ophthalmologist to see if this can be the cause of her current symptoms of acute spontaneous vertigo, left ear pressure, nausea, headache, fatigue.  Also placing  "referral to neurology  - Adult Eye  Referral; Future    Vertigo,Tinnitus, left, Nausea  Patient having acute spontaneous inconsistent episodes of left or right ear pain/pressure described as a \"zap\" that results in a variety of different symptoms, but inconsistent symptoms each time, sometimes difficulty with balance, nausea, headache, vertigo,, fatigue  She did previously have a workup with the [facility]- has had MRIs that were posted multiple years ago by the [facility].  She first noticed these symptoms 5 years ago but they have been worsening over the last 3 years with still no known cause.   -Patient has been seen by multiple ear nose and throat specialists, has a ENT appointment in the future, referral today placed for neurology  - Adult Neurology  Referral; Future      Spent 50mins doing chart review, history and exam, patient education, completing forms -see above, documentation and further activities per the note.    BMI  Estimated body mass index is 30.88 kg/m  as calculated from the following:    Height as of this encounter: 1.635 m (5' 4.37\").    Weight as of this encounter: 82.6 kg (182 lb).             Yvrose Nugent is a 48 year old, presenting for the following health issues:  paperwork        5/8/2024    10:48 AM   Additional Questions   Roomed by LUZ-LPN   Accompanied by NONE     History of Present Illness       Reason for visit:  Disability forms    She eats 0-1 servings of fruits and vegetables daily.She consumes 0 sweetened beverage(s) daily.She exercises with enough effort to increase her heart rate 30 to 60 minutes per day.  She exercises with enough effort to increase her heart rate 4 days per week.   She is taking medications regularly.     Conversion disorder - reported first 11/8/23 - 47 y.o. with past medical history relevant for depression, PTSD, panic disorder, anxiety, who presents to the emergency permit noting that over the last 2 months she has " "noted twitching in her eye, notes that more recently it is started in her face and she has noted shaking/tremors in all of the body parts now starting this morning     Chronic pain - ankles, hips, hands, back (lumbar) is the worst and prolonged pain,  Neck and posterior right shoulder   Does have periods mental health stress - triggers conversioan disorder - lasts 1-3 days.     In partial hspitalization program, intesnisve out tp , in DBT progra   Therapst 2/week.     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 Worked full time. Was laid off Feb 2023, prior to this in a toxic work environment and this made it difficult to continue to work. \"Spend years pretending she wasn't sick\"  Got another job in the company, and all of them high pressure and stress - couldn't write an email. Was crying a lot and felt things caught up with her.  Had a breakdown.   Has not tried less stressful works since them, has been in treatment and therapy since then.  At home able to be less stressed and is able to relax more.     Psych: sertraline 75mg, wellbutrin 300mg - main concern now is fatigue (physically), not napping, trouble falling asleep and wakes up a few times. Hard time fall back asleep sometimes.      Lorazepam - given by old PCP - for panic attacks and high anxiety situation - taken 2 time/jackeline also with flying) always has it with her.     Migraines - topamax - not taking this    [facility] (previous Dr started her on Hot flashes)     Urologist - rx'ing - gabapentin 200mg.     #SCC - oncology scan every year, 6 months follow ups.  Stopped letrozole - on for 2 years but made joint pain worse - stopped March 2023. S/p 2 surgery clips in iliac vein and pain on right side - pelvic pain . " "Diagnosis make anxiety worse.  Constaly worried.     Pain treatment: naproxen, muscle relaxants,   GAD7 - 19      Long term disability form - on this for mental health conditions -   Include physical conditions     Dx by ED doctor at [facility]  - with conversion disorder.   Full arm and leg spasm and movements - face movements.   DBT since Dec 2023       HPI Visit 3-3-2025    #Dizziness  Dizziness and zaps still present so requested ENT referral with [facility], seeing them in August 2025.   Saw someone at [facility] last on 1-6-2025  [facility]  6/14/2023: cochlear facial dehiscence: recommending L exp tymp with RWR?  Symptoms: some days can't drive because of it. Looking up or turning head feels \"zap\" in brain. Describes the zap as a sound and feeling, specifically in the ear area. Happens on left and right side but more on left. Unsure if she loses consciousness, vision changes with these episodes. Zap lasts a fraction of a second, sometimes there are multiple in a row. Post zaps: can have dizziness, overall uncomfortableness in her head, fatigue, nausea, sometimes imbalance.  Zaps happen multiple times a day everyday  Feel different than her facial twitches from her functional neurological disorder  Lifting heavy things can make dizziness worse, occasionally while on treadmill.     Started with ear ringing 5 years ago and worsened 3 years ago. Happens randomly but some patterns with laying down in bed makes the \"zapping\" worse and feels pressure moving from one side of her face to other. Certain sounds such as pots and pans banging together cause symptoms.     Eyes checked 5 months ago Optometrist  When things are passing her it can make her dizzy.     Scans:   CT head (2/1/2023) at [facility]  MRI of Brain (2022)    #Functional neurological disorders with abnormal movement  Completed DBT, individual therapy once a week. Did not find physical therapy super helpful " "  Symptoms: facial twitching, not as much as the limb twitching. Hand shakiness when holding something  Stress makes this worse, sitting and standing for long periods of time    #Urinary Retention  Bladder sling October 10/21/2024  Leaking is better now post this   But now having urinary retention: since surgery she feels like she has a lot of pressure but not able to urinate. Stopping and starting is normal but whole process takes longer, goes more frequently   Additionally, has machine at home that shows that she has leukocytes in it, would like to check a UA   Some pain while urinating but not excruciation   At 6 week follow up  post procedure was close to retaining more than 100 ml   Needed catheter initially when she went home     #IBS with constipation and bloating  Colonoscopy 7/23/2021: polyps found, repeat in 5 years (2026)   Was seen on 2/25/2022 GI at [facility] in [place]: felt provider was dismissive about her symptoms: recommended miralax and if not successful too use Linzess in future  Has done food allergy sensitivities/monitored symptoms: almonds, onions, garlic, dairy, apples  Has done FODMAP but not strictly   New GI appointment scheduled for April 2025    #Fatigue   Overall really low energy    Review of Systems  Constitutional, HEENT, cardiovascular, pulmonary, gi and gu systems are negative, except as otherwise noted.      Objective    /70 (BP Location: Right arm, Patient Position: Sitting, Cuff Size: Adult Regular)   Pulse 65   Temp 97.8  F (36.6  C) (Oral)   Resp 18   Ht 1.635 m (5' 4.37\")   Wt 82.6 kg (182 lb)   LMP  (LMP Unknown)   SpO2 96%   BMI 30.88 kg/m    Body mass index is 30.88 kg/m .      Physical Exam   GENERAL: alert and no distress  EYES: Eyes grossly normal to inspection, PERRL and conjunctivae and sclerae normal, with this exam we did notice pt lost focus of light and would have a left eye deviation toward medial gaze on consistent with left  HENT: ear canals and TM's " normal, nose and mouth without ulcers or lesions  NECK: no adenopathy, no asymmetry, masses, or scars  RESP: lungs clear to auscultation - no rales, rhonchi or wheezes  CV: regular rate and rhythm, normal S1 S2, no S3 or S4, no murmur, click or rub, no peripheral edema  ABDOMEN: soft, tender in right and left lower quadrant, no hepatosplenomegaly, no masses and bowel sounds normal  MS: no gross musculoskeletal defects noted, no edema  SKIN: no suspicious lesions or rashes  NEURO: Normal strength and tone 5/5 in all extremities, mentation intact and speech normal, CN 1-12 intact today.   PSYCH: mentation appears normal            Signed Electronically by: ADITYA Crain CNP

## 2025-03-29 ENCOUNTER — OFFICE VISIT (OUTPATIENT)
Dept: OPHTHALMOLOGY | Facility: CLINIC | Age: 50
End: 2025-03-29
Attending: NURSE PRACTITIONER
Payer: COMMERCIAL

## 2025-03-29 DIAGNOSIS — H81.93 VESTIBULAR DYSFUNCTION OF BOTH EARS: ICD-10-CM

## 2025-03-29 DIAGNOSIS — H04.123 DRY EYES, BILATERAL: ICD-10-CM

## 2025-03-29 DIAGNOSIS — Z01.01: ICD-10-CM

## 2025-03-29 DIAGNOSIS — H50.30 INTERMITTENT ESOTROPIA, MONOCULAR: Primary | ICD-10-CM

## 2025-03-29 DIAGNOSIS — H53.143 PHOTOPHOBIA OF BOTH EYES: ICD-10-CM

## 2025-03-29 PROCEDURE — 92014 COMPRE OPH EXAM EST PT 1/>: CPT

## 2025-03-29 PROCEDURE — 92015 DETERMINE REFRACTIVE STATE: CPT

## 2025-03-29 PROCEDURE — 92060 SENSORIMOTOR EXAMINATION: CPT

## 2025-03-29 ASSESSMENT — CONF VISUAL FIELD
OD_SUPERIOR_NASAL_RESTRICTION: 0
OS_NORMAL: 1
OS_SUPERIOR_TEMPORAL_RESTRICTION: 0
OS_SUPERIOR_NASAL_RESTRICTION: 0
OD_NORMAL: 1
OD_SUPERIOR_TEMPORAL_RESTRICTION: 0
OD_INFERIOR_NASAL_RESTRICTION: 0
OS_INFERIOR_TEMPORAL_RESTRICTION: 0
METHOD: COUNTING FINGERS
OS_INFERIOR_NASAL_RESTRICTION: 0
OD_INFERIOR_TEMPORAL_RESTRICTION: 0

## 2025-03-29 ASSESSMENT — REFRACTION_WEARINGRX
OD_ADD: +2.00
OD_SPHERE: PLANO
SPECS_TYPE: PAL
OS_CYLINDER: +0.50
OS_AXIS: 130
OS_ADD: +2.00
OD_CYLINDER: SPHERE
OS_SPHERE: -0.50

## 2025-03-29 ASSESSMENT — CUP TO DISC RATIO
OS_RATIO: 0.05
OD_RATIO: 0.05

## 2025-03-29 ASSESSMENT — TONOMETRY
OD_IOP_MMHG: 17
OS_IOP_MMHG: 20
IOP_METHOD: ICARE

## 2025-03-29 ASSESSMENT — VISUAL ACUITY
METHOD: SNELLEN - LINEAR
OS_CC: 20/20
CORRECTION_TYPE: GLASSES
OD_CC: 20/20

## 2025-03-29 ASSESSMENT — EXTERNAL EXAM - LEFT EYE: OS_EXAM: NORMAL

## 2025-03-29 ASSESSMENT — EXTERNAL EXAM - RIGHT EYE: OD_EXAM: NORMAL

## 2025-03-29 ASSESSMENT — SLIT LAMP EXAM - LIDS
COMMENTS: NORMAL
COMMENTS: NORMAL

## 2025-03-29 NOTE — PROGRESS NOTES
CC/HPI: Orquidea Arevalo is a 49 year old who presents for eye deviation. Her primary care provider saw eye movement abnormalities. She is also diagnosed with dizziness and [facility]     This all started in 2020 with her ears. She has been diagnosed with semicircular canal dehiscence,dizziness, vertigo and tinnitus. This has been getting worse and more frequent.     POH: No history of eye patching, LASIK 2007     PMH:   Past Medical History:   Diagnosis Date    Arthritis 2020    Joint pain in fingers, ankles, back, wrists, shoukders, etc    Atypical squamous cells of undetermined significance (ASCUS) on Papanicolaou smear of cervix     Formatting of this note might be different from the original.  02/04/2010 ASCUS/HPV Negative.    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    Depressive disorder 5/2023    Major depressive elisode and anxiety    Hydronephrosis     Lymphadenopathy 01/20/2020    - Dizziness    acetaminophen (TYLENOL) 500 MG tablet   buPROPion (WELLBUTRIN XL) 300 MG 24 hr tablet   cyclobenzaprine (FLEXERIL) 10 MG tablet      ibuprofen (ADVIL/MOTRIN) 200 MG tablet   linaclotide (LINZESS) 145 MCG capsule   linaclotide (LINZESS) 72 MCG capsule   LORazepam (ATIVAN) 0.5 MG tablet   meclizine (ANTIVERT) 25 MG tablet   ondansetron (ZOFRAN) 4 MG tablet   sertraline (ZOLOFT) 50 MG tablet     FH: Mom has retinal problems ( bleeding behind the retina) - retinal detachment?     SH: Never smoked cigarette, occasionally drinks, Uses medical marijuana     ASSESSMENT & PLAN    1- Vestibular dysfunction associated with dizziness, vertigo  2- Asymmetric saccadic movements  3- Dizziness  4- Flashes and floaters in both eyes -emphasized about the importance of alarming signs of retinal detachment, especially since her mother also had possibly retinal detachment.   5- Intermittent  esotropia only at near without glasses  6- Dry eye disease    Plan:  I discussed with the patient that since her symptoms are due to vestibular dysfunction and the fact that she does not have any double vision, I cannot offer her any additional treatments.  The best way is to follow-up with occupational therapy in the [facility] which she is currently doing.     She also has a follow-up with her ENT surgeon.    She also has an intermittent Esotropia deviation of the left eye, which happens only when she is looking at objects close without her glasses.  I talked about the possibility of accommodation or high accommodative convergence to accommodation being an important factor in this.  Fortunately, this does not happen with the glasses on when she is looking to the lower part of her glasses.  This is not related to her dizziness or vertigo.  Patient verbalized understanding.    We also talked that, she might have a component of dry eyes which is contributing to her symptoms.  She agreed to use artificial tears 3-4 times a day.     I am always happy to see Orquidea back for new concerns but I did not make a follow up appointment for her today.  We discussed return precautions and modes of contacting our service for changing symptoms.      Attending Physician Attestation: Complete documentation of historical and exam elements from today's encounter can be found in the full encounter summary report (not reduplicated in this progress note). I personally obtained the chief complaint(s) and history of present illness. I confirmed and edited as necessary the review of systems, past medical/surgical history, family history, social history, and examination findings as documented by others; and I examined the patient myself. I personally reviewed the relevant tests, images, and reports as documented above. I formulated and edited as necessary the assessment and plan and discussed the findings and management  plan with the patient and family.   Lance Murray MD

## 2025-04-04 PROCEDURE — 99000 SPECIMEN HANDLING OFFICE-LAB: CPT | Performed by: PATHOLOGY

## 2025-04-04 PROCEDURE — 82784 ASSAY IGA/IGD/IGG/IGM EACH: CPT | Performed by: PHYSICIAN ASSISTANT

## 2025-04-04 PROCEDURE — 86364 TISS TRNSGLTMNASE EA IG CLAS: CPT | Performed by: PHYSICIAN ASSISTANT

## 2025-04-07 ENCOUNTER — HOSPITAL ENCOUNTER (OUTPATIENT)
Dept: CT IMAGING | Facility: CLINIC | Age: 50
Discharge: HOME | End: 2025-04-07
Attending: OBSTETRICS & GYNECOLOGY | Admitting: OBSTETRICS & GYNECOLOGY
Payer: COMMERCIAL

## 2025-04-07 DIAGNOSIS — C54.1 ENDOMETRIAL STROMAL SARCOMA (H): ICD-10-CM

## 2025-04-07 PROCEDURE — 99000 SPECIMEN HANDLING OFFICE-LAB: CPT | Performed by: PATHOLOGY

## 2025-04-07 PROCEDURE — 74177 CT ABD & PELVIS W/CONTRAST: CPT

## 2025-04-07 PROCEDURE — 87338 HPYLORI STOOL AG IA: CPT | Performed by: PHYSICIAN ASSISTANT

## 2025-04-07 PROCEDURE — 71260 CT THORAX DX C+: CPT

## 2025-04-07 PROCEDURE — 250N000011 HC RX IP 250 OP 636: Performed by: OBSTETRICS & GYNECOLOGY

## 2025-04-07 RX ORDER — IOPAMIDOL 755 MG/ML
90 INJECTION, SOLUTION INTRAVASCULAR ONCE
Status: COMPLETED | OUTPATIENT
Start: 2025-04-07 | End: 2025-04-07

## 2025-04-07 RX ADMIN — IOPAMIDOL 90 ML: 755 INJECTION, SOLUTION INTRAVENOUS at 13:50

## 2025-04-16 ENCOUNTER — OFFICE VISIT (OUTPATIENT)
Dept: FAMILY MEDICINE | Facility: CLINIC | Age: 50
End: 2025-04-16
Payer: COMMERCIAL

## 2025-04-16 VITALS
WEIGHT: 180.2 LBS | BODY MASS INDEX: 30.77 KG/M2 | RESPIRATION RATE: 14 BRPM | HEIGHT: 64 IN | TEMPERATURE: 96.7 F | OXYGEN SATURATION: 99 % | SYSTOLIC BLOOD PRESSURE: 116 MMHG | HEART RATE: 63 BPM | DIASTOLIC BLOOD PRESSURE: 78 MMHG

## 2025-04-16 DIAGNOSIS — Z00.00 ROUTINE GENERAL MEDICAL EXAMINATION AT A HEALTH CARE FACILITY: Primary | ICD-10-CM

## 2025-04-16 DIAGNOSIS — E78.5 HYPERLIPIDEMIA LDL GOAL <160: ICD-10-CM

## 2025-04-16 DIAGNOSIS — R73.9 HYPERGLYCEMIA: ICD-10-CM

## 2025-04-16 DIAGNOSIS — L98.8 LUPUS MILIARIS DISSEMINATUS FACIEI: ICD-10-CM

## 2025-04-16 PROBLEM — R87.610 ATYPICAL SQUAMOUS CELLS OF UNDETERMINED SIGNIFICANCE (ASCUS) ON PAPANICOLAOU SMEAR OF CERVIX: Status: ACTIVE | Noted: 2019-10-23

## 2025-04-16 PROBLEM — R87.610 ATYPICAL SQUAMOUS CELLS OF UNDETERMINED SIGNIFICANCE (ASCUS) ON PAPANICOLAOU SMEAR OF CERVIX: Status: RESOLVED | Noted: 2019-10-23 | Resolved: 2025-04-16

## 2025-04-16 LAB
CHOLEST SERPL-MCNC: 315 MG/DL
EST. AVERAGE GLUCOSE BLD GHB EST-MCNC: 108 MG/DL
FASTING STATUS PATIENT QL REPORTED: YES
HBA1C MFR BLD: 5.4 % (ref 0–5.6)
HDLC SERPL-MCNC: 100 MG/DL
LDLC SERPL CALC-MCNC: 190 MG/DL
NONHDLC SERPL-MCNC: 215 MG/DL
TRIGL SERPL-MCNC: 127 MG/DL

## 2025-04-16 PROCEDURE — 80061 LIPID PANEL: CPT | Performed by: NURSE PRACTITIONER

## 2025-04-16 PROCEDURE — 3078F DIAST BP <80 MM HG: CPT | Performed by: NURSE PRACTITIONER

## 2025-04-16 PROCEDURE — 99396 PREV VISIT EST AGE 40-64: CPT | Performed by: NURSE PRACTITIONER

## 2025-04-16 PROCEDURE — 36415 COLL VENOUS BLD VENIPUNCTURE: CPT | Performed by: NURSE PRACTITIONER

## 2025-04-16 PROCEDURE — 83036 HEMOGLOBIN GLYCOSYLATED A1C: CPT | Performed by: NURSE PRACTITIONER

## 2025-04-16 PROCEDURE — 3074F SYST BP LT 130 MM HG: CPT | Performed by: NURSE PRACTITIONER

## 2025-04-16 SDOH — HEALTH STABILITY: PHYSICAL HEALTH: ON AVERAGE, HOW MANY DAYS PER WEEK DO YOU ENGAGE IN MODERATE TO STRENUOUS EXERCISE (LIKE A BRISK WALK)?: 4 DAYS

## 2025-04-16 ASSESSMENT — SOCIAL DETERMINANTS OF HEALTH (SDOH): HOW OFTEN DO YOU GET TOGETHER WITH FRIENDS OR RELATIVES?: ONCE A WEEK

## 2025-04-16 NOTE — PATIENT INSTRUCTIONS
Patient Education   Preventive Care Advice   This is general advice given by our system to help you stay healthy. However, your care team may have specific advice just for you. Please talk to your care team about your preventive care needs.  Nutrition  Eat 5 or more servings of fruits and vegetables each day.  Try wheat bread, brown rice and whole grain pasta (instead of white bread, rice, and pasta).  Get enough calcium and vitamin D. Check the label on foods and aim for 100% of the RDA (recommended daily allowance).  Lifestyle  Exercise at least 150 minutes each week  (30 minutes a day, 5 days a week).  Do muscle strengthening activities 2 days a week. These help control your weight and prevent disease.  No smoking.  Wear sunscreen to prevent skin cancer.  Have a dental exam and cleaning every 6 months.  Yearly exams  See your health care team every year to talk about:  Any changes in your health.  Any medicines your care team has prescribed.  Preventive care, family planning, and ways to prevent chronic diseases.  Shots (vaccines)   HPV shots (up to age 26), if you've never had them before.  Hepatitis B shots (up to age 59), if you've never had them before.  COVID-19 shot: Get this shot when it's due.  Flu shot: Get a flu shot every year.  Tetanus shot: Get a tetanus shot every 10 years.  Pneumococcal, hepatitis A, and RSV shots: Ask your care team if you need these based on your risk.  Shingles shot (for age 50 and up)  General health tests  Diabetes screening:  Starting at age 35, Get screened for diabetes at least every 3 years.  If you are younger than age 35, ask your care team if you should be screened for diabetes.  Cholesterol test: At age 39, start having a cholesterol test every 5 years, or more often if advised.  Bone density scan (DEXA): At age 50, ask your care team if you should have this scan for osteoporosis (brittle bones).  Hepatitis C: Get tested at least once in your life.  STIs (sexually  transmitted infections)  Before age 24: Ask your care team if you should be screened for STIs.  After age 24: Get screened for STIs if you're at risk. You are at risk for STIs (including HIV) if:  You are sexually active with more than one person.  You don't use condoms every time.  You or a partner was diagnosed with a sexually transmitted infection.  If you are at risk for HIV, ask about PrEP medicine to prevent HIV.  Get tested for HIV at least once in your life, whether you are at risk for HIV or not.  Cancer screening tests  Cervical cancer screening: If you have a cervix, begin getting regular cervical cancer screening tests starting at age 21.  Breast cancer scan (mammogram): If you've ever had breasts, begin having regular mammograms starting at age 40. This is a scan to check for breast cancer.  Colon cancer screening: It is important to start screening for colon cancer at age 45.  Have a colonoscopy test every 10 years (or more often if you're at risk) Or, ask your provider about stool tests like a FIT test every year or Cologuard test every 3 years.  To learn more about your testing options, visit:   .  For help making a decision, visit:   https://bit.ly/mn52123.  Prostate cancer screening test: If you have a prostate, ask your care team if a prostate cancer screening test (PSA) at age 55 is right for you.  Lung cancer screening: If you are a current or former smoker ages 50 to 80, ask your care team if ongoing lung cancer screenings are right for you.  For informational purposes only. Not to replace the advice of your health care provider. Copyright   2023 [facility]. All rights reserved. Clinically reviewed by the [facility] Transitions Program. [employer] customer insight [id number] - REV 01/24.  Preventing Falls: Care Instructions  Injuries and health problems such as trouble walking or poor eyesight can increase your risk of falling. So can some medicines. But there are things you can do to help  "prevent falls. You can exercise to get stronger. You can also arrange your home to make it safer.    Talk to your doctor about the medicines you take. Ask if any of them increase the risk of falls and whether they can be changed or stopped.   Try to exercise regularly. It can help improve your strength and balance. This can help lower your risk of falling.         Practice fall safety and prevention.   Wear low-heeled shoes that fit well and give your feet good support. Talk to your doctor if you have foot problems that make this hard.  Carry a cellphone or wear a medical alert device that you can use to call for help.  Use stepladders instead of chairs to reach high objects. Don't climb if you're at risk for falls. Ask for help, if needed.  Wear the correct eyeglasses, if you need them.        Make your home safer.   Remove rugs, cords, clutter, and furniture from walkways.  Keep your house well lit. Use night-lights in hallways and bathrooms.  Install and use sturdy handrails on stairways.  Wear nonskid footwear, even inside. Don't walk barefoot or in socks without shoes.        Be safe outside.   Use handrails, curb cuts, and ramps whenever possible.  Keep your hands free by using a shoulder bag or backpack.  Try to walk in well-lit areas. Watch out for uneven ground, changes in pavement, and debris.  Be careful in the winter. Walk on the grass or gravel when sidewalks are slippery. Use de-icer on steps and walkways. Add non-slip devices to shoes.    Put grab bars and nonskid mats in your shower or tub and near the toilet. Try to use a shower chair or bath bench when bathing.   Get into a tub or shower by putting in your weaker leg first. Get out with your strong side first. Have a phone or medical alert device in the bathroom with you.   Where can you learn more?  Go to https://www.[employer].net/patiented  Enter G117 in the search box to learn more about \"Preventing Falls: Care Instructions.\"  Current as of: " July 31, 2024  Content Version: 14.4    3950-8603 [employer].   Care instructions adapted under license by your healthcare professional. If you have questions about a medical condition or this instruction, always ask your healthcare professional. [employer] disclaims any warranty or liability for your use of this information.    Learning About Stress  What is stress?     Stress is your body's response to a hard situation. Your body can have a physical, emotional, or mental response. Stress is a fact of life for most people, and it affects everyone differently. What causes stress for you may not be stressful for someone else.  A lot of things can cause stress. You may feel stress when you go on a job interview, take a test, or run a race. This kind of short-term stress is normal and even useful. It can help you if you need to work hard or react quickly. For example, stress can help you finish an important job on time.  Long-term stress is caused by ongoing stressful situations or events. Examples of long-term stress include long-term health problems, ongoing problems at work, or conflicts in your family. Long-term stress can harm your health.  How does stress affect your health?  When you are stressed, your body responds as though you are in danger. It makes hormones that speed up your heart, make you breathe faster, and give you a burst of energy. This is called the fight-or-flight stress response. If the stress is over quickly, your body goes back to normal and no harm is done.  But if stress happens too often or lasts too long, it can have bad effects. Long-term stress can make you more likely to get sick, and it can make symptoms of some diseases worse. If you tense up when you are stressed, you may develop neck, shoulder, or low back pain. Stress is linked to high blood pressure and heart disease.  Stress also harms your emotional health. It can make you ricci, tense, or depressed. Your  relationships may suffer, and you may not do well at work or school.  What can you do to manage stress?  You can try these things to help manage stress:   Do something active. Exercise or activity can help reduce stress. Walking is a great way to get started. Even everyday activities such as housecleaning or yard work can help.  Try yoga or adama chi. These techniques combine exercise and meditation. You may need some training at first to learn them.  Do something you enjoy. For example, listen to music or go to a movie. Practice your hobby or do volunteer work.  Meditate. This can help you relax, because you are not worrying about what happened before or what may happen in the future.  Do guided imagery. Imagine yourself in any setting that helps you feel calm. You can use online videos, books, or a teacher to guide you.  Do breathing exercises. For example:  From a standing position, bend forward from the waist with your knees slightly bent. Let your arms dangle close to the floor.  Breathe in slowly and deeply as you return to a standing position. Roll up slowly and lift your head last.  Hold your breath for just a few seconds in the standing position.  Breathe out slowly and bend forward from the waist.  Let your feelings out. Talk, laugh, cry, and express anger when you need to. Talking with supportive friends or family, a counselor, or a jagjit leader about your feelings is a healthy way to relieve stress. Avoid discussing your feelings with people who make you feel worse.  Write. It may help to write about things that are bothering you. This helps you find out how much stress you feel and what is causing it. When you know this, you can find better ways to cope.  What can you do to prevent stress?  You might try some of these things to help prevent stress:  Manage your time. This helps you find time to do the things you want and need to do.  Get enough sleep. Your body recovers from the stresses of the day while  "you are sleeping.  Get support. Your family, friends, and community can make a difference in how you experience stress.  Limit your news feed. Avoid or limit time on social media or news that may make you feel stressed.  Do something active. Exercise or activity can help reduce stress. Walking is a great way to get started.  Where can you learn more?  Go to https://www.[employer].net/patiented  Enter N032 in the search box to learn more about \"Learning About Stress.\"  Current as of: October 24, 2024  Content Version: 14.4    6013-6809 [employer].   Care instructions adapted under license by your healthcare professional. If you have questions about a medical condition or this instruction, always ask your healthcare professional. [employer] disclaims any warranty or liability for your use of this information.       "

## 2025-04-16 NOTE — PROGRESS NOTES
"Preventive Care Visit  [facility]  Sheba Jane, ADITYA CNP, Family Medicine  Apr 16, 2025      Assessment & Plan     Routine general medical examination at a health care facility  We discussed healthy lifestyle, nutrition, cardiovascular risk reduction, self care, safety, sunscreen, and timing of cancer screening.  Health maintenance screening and immunizations reviewed with the patient.  Follow up yearly for the annual physical.    Patient has multiple follow-ups with specialists -for further investigations and to chronic conditions  Continue to follow psychiatry for psychiatric medications    Lupus miliaris disseminatus faciei  Referral to derm  - Adult Dermatology  Referral    Hyperlipidemia LDL goal <160  Screening for hypercholesterolemia.  Patient not fasting today  - Lipid Profile (Chol, Trig, HDL, LDL calc)  - Lipid Profile (Chol, Trig, HDL, LDL calc)    Hyperglycemia  Screening for hemoglobin A1c diabetes  - Hemoglobin A1c  - Hemoglobin A1c              BMI  Estimated body mass index is 30.46 kg/m  as calculated from the following:    Height as of this encounter: 1.638 m (5' 4.49\").    Weight as of this encounter: 81.7 kg (180 lb 3.2 oz).   Weight management plan: Discussed healthy diet and exercise guidelines    Counseling  Appropriate preventive services were addressed with this patient via screening, questionnaire, or discussion as appropriate for fall prevention, nutrition, physical activity, Tobacco-use cessation, social engagement, weight loss and cognition.  Checklist reviewing preventive services available has been given to the patient.  Reviewed patient's diet, addressing concerns and/or questions.   The patient was instructed to see the dentist every 6 months.   She is at risk for psychosocial distress and has been provided with information to reduce risk.           Yvrose Nugent is a 49 year old, presenting for the following:  Physical        4/16/2025     " 1:24 PM   Additional Questions   Roomed by MARIO HUGHES          HPI    DERM has dx of Lupus miliaris disseminatus faciei - last seen by My dermatologist in September of 2024     Constipation - seen by GI - not started Linzess yet - $500 until she   Taking Mg citrate - mild improvement with this.    Saw ophthalmologist - for eye drift - he wasn't concerned about it. - no double vision. They suggest she sees the neurologist - Aug 4th, August 20th vestibular consult, balance testing in July .      Has ENT coming up     Flexeril - VERY rare use. - maybe a few times/year        Meclizine ebs and flows between 2-20 days a months.  Zofran same things       The 10-year ASCVD risk score (Elav DK, et al., 2019) is: 0.9%    Values used to calculate the score:      Age: 49 years      Sex: Female      Is Non- : No      Diabetic: No      Tobacco smoker: No      Systolic Blood Pressure: 116 mmHg      Is BP treated: No      HDL Cholesterol: 100 mg/dL      Total Cholesterol: 315 mg/dL          Advance Care Planning    Discussed advance care planning with patient; however, patient declined at this time.        4/16/2025   General Health   How would you rate your overall physical health? (!) FAIR   Feel stress (tense, anxious, or unable to sleep) Rather much   (!) STRESS CONCERN      4/16/2025   Nutrition   Three or more servings of calcium each day? (!) NO   Diet: Other   If other, please elaborate: low fodmap   How many servings of fruit and vegetables per day? (!) 0-1   How many sweetened beverages each day? 0-1         4/16/2025   Exercise   Days per week of moderate/strenous exercise 4 days         4/16/2025   Social Factors   Frequency of gathering with friends or relatives Once a week   Worry food won't last until get money to buy more No   Food not last or not have enough money for food? No   Do you have housing? (Housing is defined as stable permanent housing and does not include staying ouside in a car,  in a tent, in an abandoned building, in an overnight shelter, or couch-surfing.) Yes   Are you worried about losing your housing? No   Lack of transportation? No   Unable to get utilities (heat,electricity)? No         4/16/2025   Fall Risk   Fallen 2 or more times in the past year? No   Trouble with walking or balance? Yes   Gait Speed Test (Document in seconds) 4   Gait Speed Test Interpretation Less than or equal to 5.00 seconds - PASS          4/16/2025   Dental   Dentist two times every year? (!) NO         Today's PHQ-9 Score:       4/4/2025     6:55 AM   PHQ-9 SCORE   PHQ-9 Total Score 7           4/16/2025   Substance Use   Alcohol more than 3/day or more than 7/wk No   Do you use any other substances recreationally? No     Social History     Tobacco Use    Smoking status: Never     Passive exposure: Past    Smokeless tobacco: Never   Vaping Use    Vaping status: Never Used   Substance Use Topics    Alcohol use: Yes     Comment: Occasional 2x month    Drug use: Never           1/14/2025   LAST FHS-7 RESULTS   1st degree relative breast or ovarian cancer No   Any relative bilateral breast cancer No   Any male have breast cancer No   Any ONE woman have BOTH breast AND ovarian cancer No   Any woman with breast cancer before 50yrs No   2 or more relatives with breast AND/OR ovarian cancer No   2 or more relatives with breast AND/OR bowel cancer No                4/16/2025   STI Screening   New sexual partner(s) since last STI/HIV test? No     History of abnormal Pap smear: No need for further retesting.  Patient seen by oncology status post total hysterectomy and followed by them        8/23/2019     4:20 PM   PAP / HPV   PAP-ABSTRACT See Scanned Document           This result is from an external source.     ASCVD Risk   The 10-year ASCVD risk score (Elva PRITCHARD, et al., 2019) is: 0.9%    Values used to calculate the score:      Age: 49 years      Sex: Female      Is Non- : No       "Diabetic: No      Tobacco smoker: No      Systolic Blood Pressure: 116 mmHg      Is BP treated: No      HDL Cholesterol: 100 mg/dL      Total Cholesterol: 315 mg/dL           Reviewed and updated as needed this visit by Provider   Tobacco  Allergies  Meds  Problems  Med Hx  Surg Hx  Fam Hx                     Objective    Exam  /78 (BP Location: Right arm, Patient Position: Sitting, Cuff Size: Adult Regular)   Pulse 63   Temp (!) 96.7  F (35.9  C)   Resp 14   Ht 1.638 m (5' 4.49\")   Wt 81.7 kg (180 lb 3.2 oz)   LMP  (LMP Unknown)   SpO2 99%   BMI 30.46 kg/m     Estimated body mass index is 30.46 kg/m  as calculated from the following:    Height as of this encounter: 1.638 m (5' 4.49\").    Weight as of this encounter: 81.7 kg (180 lb 3.2 oz).    Physical Exam  GENERAL: alert and no distress  EYES: Eyes grossly normal to inspection, PERRL and conjunctivae and sclerae normal  HENT: ear canals and TM's normal, nose and mouth without ulcers or lesions  NECK: no adenopathy, no asymmetry, masses, or scars  RESP: lungs clear to auscultation - no rales, rhonchi or wheezes  CV: regular rate and rhythm, normal S1 S2, no S3 or S4, no murmur, click or rub, no peripheral edema  ABDOMEN: soft, nontender, no hepatosplenomegaly, no masses and bowel sounds normal  MS: no gross musculoskeletal defects noted, no edema  SKIN: no suspicious lesions or rashes  NEURO: Normal strength and tone, mentation intact and speech normal  PSYCH: mentation appears normal, affect normal/bright        Signed Electronically by: ADITYA Crain CNP    "

## 2025-04-22 ENCOUNTER — E-VISIT (OUTPATIENT)
Dept: FAMILY MEDICINE | Facility: CLINIC | Age: 50
End: 2025-04-22
Payer: COMMERCIAL

## 2025-04-22 DIAGNOSIS — E78.5 HYPERLIPIDEMIA LDL GOAL <130: Primary | ICD-10-CM

## 2025-04-22 DIAGNOSIS — R74.01 ELEVATED ALT MEASUREMENT: ICD-10-CM

## 2025-04-23 RX ORDER — ROSUVASTATIN CALCIUM 40 MG/1
40 TABLET, COATED ORAL DAILY
Qty: 90 TABLET | Refills: 3 | Status: SHIPPED | OUTPATIENT
Start: 2025-04-23

## 2025-04-24 NOTE — PATIENT INSTRUCTIONS
I am glad you are doing well. I have refilled your medication:  Orders Placed This Encounter   Medications     rosuvastatin (CRESTOR) 40 MG tablet     Sig: Take 1 tablet (40 mg) by mouth daily.     Dispense:  90 tablet     Refill:  3      Please see my comments in Bold to your previously sent thoughtful questions.    Rosuvastatin 40 mg sent with refills for 1 year given that her LDL cholesterol is over 190 -goal of less than 130     One more thing from my a previous chest CT scan (for recurrent cancer) at [facility] on 3/9/2020. They reported  Mild atherosclerotic changes of the thoracic aorta . Should that area be looked at again to see how it has progressed-maybe someone could look closely at my most recent scan from a few weeks back?     -I took a look at the CT of the chest and abdomen completed on 4/7/2025 -the vasculature of the abdominal and iliac arteries was unremarkable and the report.  Additionally the pulmonary artery and thoracic aorta appeared normal in size    For the past couple of years, my AST and ALT have been consistently mildly elevated. I also have some stable liver lesions on imaging and long-standing fatigue that significantly affects my daily life.     -There were some benign cysts in the liver seen on your CT scan from 4/7/2025 with no new liver lesions dating back to 1/22/2020, and a smooth liver capsule, normal gallbladder and bile ducts were also mentioned in that CT scan, typically if radiologist mention or comment on any of the CT scans that hepatic steatosis is noted this is when we would do a FibroScan.     I am happy to place the order but it is not guaranteed that your insurance will cover this given that this was not seen in prior CT scans    My recent lipid panel showed very high HDL (100 mg/dL) and total cholesterol (315 mg/dL), with normal triglycerides -- which I understand can sometimes be associated with liver or metabolic conditions.     You are correct and that hyperlipidemia is  a metabolic condition in and of itself.  You do have hyperlipidemia which is LDL cholesterol.  Typically able be is measured when people have an abnormal LDL in addition to triglycerides as the traditional LDL lab can be inaccurate in the face of high triglycerides.  In your situation you do not have high triglycerides however I will place a lab order to get the ApoB checked but again it is not guaranteed insurance will cover this test as it is not routinely done, as LDL is typically sufficient to determine the effectiveness of a cholesterol-lowering medication.    Lpa a marker that can help predict plaque buildup as it binds to the LDL -this can help give us an idea if the cholesterol level is related to genetics.  However again this is not likely covered by insurance given that this is not a routine screening.  Regardless of these numbers it can give us a little bit more information of where your high LDL comes from however it does not change the recommendation to start a cholesterol-lowering medication.      Medications to lower Lpa and ApoB, are not yet available or FDA approved.  Multiple trials for these are completing in 2025 but will take will still take a long time for these medications to be mainstream.  Moreover while some have been shown to decrease these levels, they have not yet been shown to reduce the risk of cardiovascular disease.     I know my alkaline phosphatase (ALP) has been normal, which might make primary biliary cholangitis less likely, but I d still appreciate ruling out possible autoimmune liver diseases (like autoimmune hepatitis or others). Could we consider checking labs like:                      FRED                      ASMA                      IgG                      ApoB                      Lipoprotein(a)     And possibly a FibroScan or further liver imaging if appropriate? -I would wait on this to see if the above labs reveal anything that will help get us better coverage for  the scan.  As of this time your most recent CT scans do not show that you have fatty infiltrates in the liver.     I m just hoping to get to the root of the abnormal labs and fatigue. Thanks for your time and support! -I agree that this is important    The following lab orders have been placed:     Hyperlipidemia LDL goal <130 (Primary)    - rosuvastatin (CRESTOR) 40 MG tablet; Take 1 tablet (40 mg) by mouth daily.  Dispense: 90 tablet; Refill: 3  - Lipoprotein (a); Future  - Apolipoprotein B; Future     Elevated ALT measurement    - Comprehensive metabolic panel (BMP + Alb, Alk Phos, ALT, AST, Total. Bili, TP); Future  - Anti Nuclear Crystal IgG by IFA with Reflex; Future  - F Actin EIA with reflex; Future  - Hepatic panel; Future  - GGT; Future  - IgG; Future    Please schedule a fasting lab only visit at your soonest convenience.         View your full visit summary for details by clicking on the link below. Your pharmacist will be able to address any questions you may have about the medication.      Thank you for choosing us for your care.    ADITYA Crain CNP on 4/23/2025 at 7:35 PM

## 2025-04-24 NOTE — TELEPHONE ENCOUNTER
Provider E-Visit time total (minutes):15 minutes    Rosuvastatin 40 mg sent with refills for 1 year given that her LDL cholesterol is over 190 -goal of less than 130     One more thing from my a previous chest CT scan (for recurrent cancer) at [facility] on 3/9/2020. They reported  Mild atherosclerotic changes of the thoracic aorta . Should that area be looked at again to see how it has progressed-maybe someone could look closely at my most recent scan from a few weeks back?     -I took a look at the CT of the chest and abdomen completed on 4/7/2025 -the vasculature of the abdominal and iliac arteries was unremarkable and the report.  Additionally the pulmonary artery and thoracic aorta appeared normal in size    For the past couple of years, my AST and ALT have been consistently mildly elevated. I also have some stable liver lesions on imaging and long-standing fatigue that significantly affects my daily life.     -There were some benign cysts in the liver seen on your CT scan from 4/7/2025 with no new liver lesions dating back to 1/22/2020, and a smooth liver capsule, normal gallbladder and bile ducts were also mentioned in that CT scan, typically if radiologist mention or comment on any of the CT scans that hepatic steatosis is noted this is when we would do a FibroScan.     I am happy to place the order but it is not guaranteed that your insurance will cover this given that this was not seen in prior CT scans    My recent lipid panel showed very high HDL (100 mg/dL) and total cholesterol (315 mg/dL), with normal triglycerides -- which I understand can sometimes be associated with liver or metabolic conditions.     You are correct and that hyperlipidemia is a metabolic condition in and of itself.  You do have hyperlipidemia which is LDL cholesterol.  Typically able be is measured when people have an abnormal LDL in addition to triglycerides as the traditional LDL lab can be inaccurate in the face of high triglycerides.   In your situation you do not have high triglycerides however I will place a lab order to get the ApoB checked but again it is not guaranteed insurance will cover this test as it is not routinely done, as LDL is typically sufficient to determine the effectiveness of a cholesterol-lowering medication.    Lpa a marker that can help predict plaque buildup as it binds to the LDL -this can help give us an idea if the cholesterol level is related to genetics.  However again this is not likely covered by insurance given that this is not a routine screening.  Regardless of these numbers it can give us a little bit more information of where your high LDL comes from however it does not change the recommendation to start a cholesterol-lowering medication.      Medications to lower Lpa and ApoB, are not yet available or FDA approved.  Multiple trials for these are completing in 2025 but will take will still take a long time for these medications to be mainstream.  Moreover while some have been shown to decrease these levels, they have not yet been shown to reduce the risk of cardiovascular disease.     I know my alkaline phosphatase (ALP) has been normal, which might make primary biliary cholangitis less likely, but I d still appreciate ruling out possible autoimmune liver diseases (like autoimmune hepatitis or others). Could we consider checking labs like:                     FRED                     ASMA                     IgG                     ApoB                     Lipoprotein(a)     And possibly a FibroScan or further liver imaging if appropriate? -I would wait on this to see if the above labs reveal anything that will help get us better coverage for the scan.  As of this time your most recent CT scans do not show that you have fatty infiltrates in the liver.     I m just hoping to get to the root of the abnormal labs and fatigue. Thanks for your time and support! -I agree that this is important

## 2025-04-29 ENCOUNTER — LAB (OUTPATIENT)
Dept: LAB | Facility: CLINIC | Age: 50
End: 2025-04-29
Payer: COMMERCIAL

## 2025-04-29 DIAGNOSIS — E78.5 HYPERLIPIDEMIA LDL GOAL <130: ICD-10-CM

## 2025-04-29 DIAGNOSIS — R74.01 ELEVATED ALT MEASUREMENT: ICD-10-CM

## 2025-04-29 LAB
ALBUMIN SERPL BCG-MCNC: 4.4 G/DL (ref 3.5–5.2)
ALP SERPL-CCNC: 91 U/L (ref 40–150)
ALT SERPL W P-5'-P-CCNC: 59 U/L (ref 0–50)
ANION GAP SERPL CALCULATED.3IONS-SCNC: 8 MMOL/L (ref 7–15)
APO A-I SERPL-MCNC: 108 MG/DL
AST SERPL W P-5'-P-CCNC: 39 U/L (ref 0–45)
BILIRUB DIRECT SERPL-MCNC: 0.14 MG/DL (ref 0–0.3)
BILIRUB SERPL-MCNC: 0.3 MG/DL
BUN SERPL-MCNC: 13.4 MG/DL (ref 6–20)
CALCIUM SERPL-MCNC: 9.6 MG/DL (ref 8.8–10.4)
CHLORIDE SERPL-SCNC: 105 MMOL/L (ref 98–107)
CREAT SERPL-MCNC: 0.92 MG/DL (ref 0.51–0.95)
EGFRCR SERPLBLD CKD-EPI 2021: 75 ML/MIN/1.73M2
GGT SERPL-CCNC: 103 U/L (ref 5–36)
GLUCOSE SERPL-MCNC: 95 MG/DL (ref 70–99)
HCO3 SERPL-SCNC: 28 MMOL/L (ref 22–29)
POTASSIUM SERPL-SCNC: 4.6 MMOL/L (ref 3.4–5.3)
PROT SERPL-MCNC: 6.9 G/DL (ref 6.4–8.3)
SODIUM SERPL-SCNC: 141 MMOL/L (ref 135–145)

## 2025-04-29 PROCEDURE — 83695 ASSAY OF LIPOPROTEIN(A): CPT

## 2025-04-29 PROCEDURE — 86039 ANTINUCLEAR ANTIBODIES (ANA): CPT

## 2025-04-29 PROCEDURE — 83516 IMMUNOASSAY NONANTIBODY: CPT | Mod: 90

## 2025-04-29 PROCEDURE — 82977 ASSAY OF GGT: CPT

## 2025-04-29 PROCEDURE — 36415 COLL VENOUS BLD VENIPUNCTURE: CPT

## 2025-04-29 PROCEDURE — 99000 SPECIMEN HANDLING OFFICE-LAB: CPT

## 2025-04-29 PROCEDURE — 82248 BILIRUBIN DIRECT: CPT

## 2025-04-29 PROCEDURE — 80053 COMPREHEN METABOLIC PANEL: CPT

## 2025-04-29 PROCEDURE — 82784 ASSAY IGA/IGD/IGG/IGM EACH: CPT

## 2025-04-29 PROCEDURE — 86038 ANTINUCLEAR ANTIBODIES: CPT

## 2025-04-29 PROCEDURE — 82172 ASSAY OF APOLIPOPROTEIN: CPT | Mod: 90

## 2025-04-30 LAB
ANA PAT SER IF-IMP: ABNORMAL
ANA SER QL IF: POSITIVE
ANA TITR SER IF: ABNORMAL {TITER}
IGG SERPL-MCNC: 843 MG/DL (ref 610–1616)

## 2025-05-01 LAB
APO B100 SERPL-MCNC: 99 MG/DL
SMA IGG SER-ACNC: 4 UNITS

## 2025-05-06 ENCOUNTER — PATIENT OUTREACH (OUTPATIENT)
Dept: CARE COORDINATION | Facility: CLINIC | Age: 50
End: 2025-05-06
Payer: COMMERCIAL

## 2025-05-07 NOTE — CONFIDENTIAL NOTE
DIAGNOSIS:  Elevated ALT measurement   Appt Date:  07.14.2025    NOTES STATUS DETAILS   OFFICE NOTE from referring provider Internal 04.22.2025 Sheba Jane APRN CNP    MEDICATION LIST Internal    IMAGING     COLONOSCOPY (IF AVAILABLE) Care Everywhere 07.23.2021   LABS     HEPATIC PANEL (LIVER PANEL) Care Everywhere 03.01.2021   BASIC METABOLIC PANEL Internal 10.04.2024   COMPLETE METABOLIC PANEL Internal 10.04.2024   COMPLETE BLOOD COUNT (CBC) Internal 10.04.2024    INTERNATIONAL NORMALIZED RATIO (INR) Care Everywhere 06.11.2022

## 2025-05-08 ENCOUNTER — TELEPHONE (OUTPATIENT)
Dept: OTOLARYNGOLOGY | Facility: CLINIC | Age: 50
End: 2025-05-08
Payer: COMMERCIAL

## 2025-05-08 NOTE — TELEPHONE ENCOUNTER
LVM that Dr Landaverde isnt available and we needed to cancel the visit. The clinic will call the patient after balance testing results and determine if a clinic visit is needed      Gave ENT number

## 2025-05-27 ENCOUNTER — MYC MEDICAL ADVICE (OUTPATIENT)
Dept: FAMILY MEDICINE | Facility: CLINIC | Age: 50
End: 2025-05-27
Payer: COMMERCIAL

## 2025-05-27 NOTE — TELEPHONE ENCOUNTER
Result comment from 4/29/25:   Taras Nugent,   You do have a moderately elevated lipoprotein a -it would be best to start that statin if you have not yet.  Your FRED is positive with a speckled pattern with normal IGG, (labs from 10/12/2023 reveals normal CRP, sed rate, RF, CCP).  Past imaging does not indicate fatty liver disease.      I'm placing a referral to hepatology for them to follow up with you for further testing.     They should reach out to you in the next 3 business days

## 2025-05-28 NOTE — TELEPHONE ENCOUNTER
Please call patient to discuss her questions further and do a break down of her specific risks of future health concerns - may schedule virtual visit in any blocked spot with me.     Thank you

## 2025-05-29 NOTE — TELEPHONE ENCOUNTER
Outgoing call to patient. No answer. LMTCB and [employer] message sent. When pt calls back anyone can assist pt in scheduling appointment with provider. Thank you.

## 2025-07-01 DIAGNOSIS — R79.89 ABNORMAL LFTS: Primary | ICD-10-CM

## 2025-07-09 ENCOUNTER — LAB (OUTPATIENT)
Dept: LAB | Facility: CLINIC | Age: 50
End: 2025-07-09
Payer: COMMERCIAL

## 2025-07-09 ENCOUNTER — OFFICE VISIT (OUTPATIENT)
Dept: GASTROENTEROLOGY | Facility: CLINIC | Age: 50
End: 2025-07-09
Attending: PHYSICIAN ASSISTANT
Payer: COMMERCIAL

## 2025-07-09 VITALS
HEIGHT: 65 IN | DIASTOLIC BLOOD PRESSURE: 82 MMHG | WEIGHT: 180.4 LBS | HEART RATE: 61 BPM | OXYGEN SATURATION: 95 % | BODY MASS INDEX: 30.06 KG/M2 | SYSTOLIC BLOOD PRESSURE: 123 MMHG

## 2025-07-09 DIAGNOSIS — R12 HEARTBURN: ICD-10-CM

## 2025-07-09 DIAGNOSIS — R14.0 BLOATING: ICD-10-CM

## 2025-07-09 DIAGNOSIS — R79.89 ABNORMAL LFTS: ICD-10-CM

## 2025-07-09 DIAGNOSIS — R10.13 DYSPEPSIA: Primary | ICD-10-CM

## 2025-07-09 DIAGNOSIS — K58.1 IRRITABLE BOWEL SYNDROME WITH CONSTIPATION: ICD-10-CM

## 2025-07-09 DIAGNOSIS — R19.8 IRREGULAR BOWEL HABITS: ICD-10-CM

## 2025-07-09 LAB
ALBUMIN SERPL BCG-MCNC: 4.3 G/DL (ref 3.5–5.2)
ALP SERPL-CCNC: 82 U/L (ref 40–150)
ALT SERPL W P-5'-P-CCNC: 41 U/L (ref 0–50)
ANION GAP SERPL CALCULATED.3IONS-SCNC: 10 MMOL/L (ref 7–15)
AST SERPL W P-5'-P-CCNC: 40 U/L (ref 0–45)
BILIRUB SERPL-MCNC: 0.4 MG/DL
BILIRUBIN DIRECT (ROCHE PRO & PURE): 0.15 MG/DL (ref 0–0.45)
BUN SERPL-MCNC: 15.9 MG/DL (ref 6–20)
CALCIUM SERPL-MCNC: 9.7 MG/DL (ref 8.8–10.4)
CHLORIDE SERPL-SCNC: 105 MMOL/L (ref 98–107)
CREAT SERPL-MCNC: 0.86 MG/DL (ref 0.51–0.95)
EGFRCR SERPLBLD CKD-EPI 2021: 82 ML/MIN/1.73M2
ERYTHROCYTE [DISTWIDTH] IN BLOOD BY AUTOMATED COUNT: 11.7 % (ref 10–15)
GLUCOSE SERPL-MCNC: 99 MG/DL (ref 70–99)
HBV CORE AB SERPL QL IA: NONREACTIVE
HBV SURFACE AB SERPL IA-ACNC: <3.5 M[IU]/ML
HBV SURFACE AB SERPL IA-ACNC: NONREACTIVE M[IU]/ML
HBV SURFACE AG SERPL QL IA: NONREACTIVE
HCO3 SERPL-SCNC: 24 MMOL/L (ref 22–29)
HCT VFR BLD AUTO: 39.8 % (ref 35–47)
HCV AB SERPL QL IA: NONREACTIVE
HGB BLD-MCNC: 13.8 G/DL (ref 11.7–15.7)
INR PPP: 0.96 (ref 0.85–1.15)
MCH RBC QN AUTO: 29.7 PG (ref 26.5–33)
MCHC RBC AUTO-ENTMCNC: 34.7 G/DL (ref 31.5–36.5)
MCV RBC AUTO: 86 FL (ref 78–100)
PLATELET # BLD AUTO: 218 10E3/UL (ref 150–450)
POTASSIUM SERPL-SCNC: 4.4 MMOL/L (ref 3.4–5.3)
PROT SERPL-MCNC: 7.1 G/DL (ref 6.4–8.3)
PROTHROMBIN TIME: 12.8 SECONDS (ref 11.8–14.8)
RBC # BLD AUTO: 4.64 10E6/UL (ref 3.8–5.2)
SODIUM SERPL-SCNC: 139 MMOL/L (ref 135–145)
WBC # BLD AUTO: 4.5 10E3/UL (ref 4–11)

## 2025-07-09 PROCEDURE — 86704 HEP B CORE ANTIBODY TOTAL: CPT

## 2025-07-09 PROCEDURE — 36415 COLL VENOUS BLD VENIPUNCTURE: CPT

## 2025-07-09 PROCEDURE — 85027 COMPLETE CBC AUTOMATED: CPT

## 2025-07-09 PROCEDURE — 82248 BILIRUBIN DIRECT: CPT

## 2025-07-09 PROCEDURE — 86706 HEP B SURFACE ANTIBODY: CPT

## 2025-07-09 PROCEDURE — 86803 HEPATITIS C AB TEST: CPT

## 2025-07-09 PROCEDURE — 87340 HEPATITIS B SURFACE AG IA: CPT

## 2025-07-09 PROCEDURE — 80053 COMPREHEN METABOLIC PANEL: CPT

## 2025-07-09 PROCEDURE — 85610 PROTHROMBIN TIME: CPT

## 2025-07-09 RX ORDER — OMEPRAZOLE 40 MG/1
40 CAPSULE, DELAYED RELEASE ORAL DAILY
Qty: 90 CAPSULE | Refills: 0 | Status: SHIPPED | OUTPATIENT
Start: 2025-07-09

## 2025-07-09 ASSESSMENT — PAIN SCALES - GENERAL: PAINLEVEL_OUTOF10: NO PAIN (0)

## 2025-07-09 NOTE — NURSING NOTE
"Do you have a history of colon cancer in your immediate family? NO    If yes who: negative     And what age  were they diagnosed:       No chief complaint on file.      Vitals:    07/09/25 1037   BP: 123/82   Pulse: 61   SpO2: 95%   Weight: 81.8 kg (180 lb 6.4 oz)   Height: 1.651 m (5' 5\")       Body mass index is 30.02 kg/m .    Gregory Wheeler MA    "

## 2025-07-09 NOTE — PROGRESS NOTES
GI CLINIC VISIT    ASSESSMENT/PLAN:  Orquidea Arevalo is a 49 year old year old female with PMHx of endometrial stromal sarcoma (s/p full hysterectomy w/ debulking monitored 6 months), chronic pain (w/ regular marijuana use x 4y), s/p bladder sling, high tone pelvic floor dysfunction, MDD following with the [facility] group for IBS-C and dyspepsia.     #IBS-C, per [facility]   #high tone pelvic floor dysfunction   10/2024 CTAP W contrast w/o evidence of intraluminal masses or bowel obstruction. Basic labs are normal including CBC, metabolic panel, and TSH. Celiac serologies were NEG with intact total IgA. Her colonoscopy is UTD - done 7/2021 to cecum with excellent prep. Two 3-4 mm polyps taken from ascending (TA, SSA) and findings of non-bleeding internal hemorrhoids on retroflexion. She previously followed with pelvic floor PT for high tone pelvic floor dysfunction.     Ddx for stooling pattern includes malabsorption, IMO vs other.    Plan  Now longer constipated but having irregular bowel patterns with varying consistencies and sensation of incomplete evacuation   -bowel clean out then lower magnesium to 300 mg at bedtime. If she has more constipation, OK to increase to 600 mg at bedtime. Linzess quoted at 500$ per mo.   -never heard from Pelvic Floor team, re-referral to [facility].   -continue good hydration and dietary fiber goal of 25-35g, gradually  -Cont PRN bentyl for cramping (not had to use). Reviewed SE, particularly constipation w/ regular use     Future consideration  1.Health psyD consult - may benefit from consultation   2. Discuss GI RD consult in future for overview of current diet - eating lower low fodmap foods. Unclear where she in reintroduction   3. Alternate secretagogue  4. Breath test for IMO    #heartburn  #dyspepsia   #occasional nausea w/o emesis   CTAP 4/2025 with normal gallbladder, recent LFTs with mild elevation in ALT but not of alk phos. No dilation of biliary duct. Father  required CCY for symptomatic gallstones. Bloat and dyspepetic sx worsen with fatty meals which she tries to avoid.  H. pylori stool antigen negative.     Sx could be due to GERD with ddx of functional disease, biliary disease. Retained stools are thought to be compounding these upper GI sx too    Plan  -bowel clean out then daily magnesium  citrate 300 mg   -daily omeprazole 40 mg x 90 d  -HIDA    Future consideration  -If heartburn, dyspeptic sx remain despite omeprazole trial, proceed with EGD w/ pH monitoring (so long as no nickel allergy)     #globus  She is describing globus sensation in the evening of dry swallows (not of swallows w/ food/pills/liquids). These sx are not present in the daytime and do not prevent her from eating/hydrating     Plan  -PPI trial per above    Future consideration  -ENT consult (for globus)    No orders of the defined types were placed in this encounter.    Colorectal cancer screenin2026, 3-4 mm TA and SSA    RTC - 10-12wk or sooner or PRN     Thank you for this consultation.  It was a pleasure to participate in the care of this patient; please contact me with any further questions.     Lynda De Los Santos PA-C    Follow up: As planned above. Today, I personally spent 40  minutes spent on the date of the encounter doing chart review, history and exam, documentation and further activities per the note.    HPI  Orquidea Arevalo is a 49 year old year old female with PMHx of endometrial stromal sarcoma (s/p full hysterectomy w/ debulking monitored 6 months), chronic pain (w/ regular marijuana use x 4y), s/p bladder sling, high tone pelvic floor dysfunction, MDD following with the [facility] group for IBS-C and dyspepsia.     Initial appt with me 25  1. Shares dx with IBS in  - had a lot of constipation, bloating, and abd pain    She is currently dealing with bloating, low back pain, pressure on bladder and concerned about urinary control (she had bladder sling )    -feels bloated  all the time, especially at night   -does have lower abd pain that improves/resolves w/ defecation   -tried low fodmap diet which she feels helped but doesn't resolve sx . Removing almonds has been helpful w/ cutting down gas   -shares did food allergy testing that was negative/unremarkable.     Does struggle with abd pain - usually lower abdomen  -she feels the stool going through and feels it delia   -does also have upper abd pain too   -usually with a BM, these pains go away   -she does endorse relationship of these pain to certain food triggers (onions, garlic),   -When pain is bad, she can't empty bladder as effectively    BM - constipation pattern, occasional diarrhea (with urgency)   Can go every 3-4d w/o a BM,however in last few weeks, passing 1 soft small  volume stool daily, can have a day of clustered BMs too  -endorses incomplete evacuation   -on toilet for few minutes but can stay for a longer time too   -does strain, tried toilet stool with equivocal results   -no bloody or black stools     Meds  Went on miralax but got diarrhea   Did not try magnesium yet   Primary had Rx'd linzess but it was too expensive. She meets deductible next week.     Saw pelvic health PT (bladder) who told her constipation will affect her urinary stream too. That provider rec'd toilet stool. Therapy occurred last summer     CScope 2021 with a few polyps with 5 year recall   TTG Ab - IgA NEG but no total IgA  No FHX of CRC, celiac dz    Weight / appetite - stable   Does eat gluten    2. Does have nausea but no vomiting     3. Does have heartburn troubles 2-3x a week   Taking 2 tums a few times a day . Not tried pepcid  No dysphagia , odynphagia or melena     4. At night, she experiences a fullness to the back of her throat. With swallowing, it gets better.   -in the evening, finds that she needs to concentrate more while swallowing. Denies similar troubles in the daytime   -she denies coughing/gagging/spitting up w/ a  swallow  -never had obstruction     Today July 9, 2025  At our last visit, celiac serologies (NEG) and h.pylori (NEG)  Referred to pelvic floor PT   Linzess 72 mcg cost prohibitive  Elevated GGT, mild elevated ALT and speckled FRED, to see hepatology next wk     Started with 2 magnesium gummy (600 mg, mag citrate)   -she is having some irregular bowel patterns, BM still feel incomplete movements, can be clustered, with a lot of urgency and varied consistency (skinny soft BM to pudding consistency; rarely will she see a nicely formed stool). No longer having constipation like before but will still go 1-2d w/o a BM  -will see light colored stools, to see hepatology next week to establish care   -no bloody or black stools   -stooling 4-5x a week  -BM frequency anywhere from 0-4 BMs  having a lot of bloating, especially at night; feels very full and can become painful (tightness, especially R side of abdomen), it's not painful but she feels it is very tight. This painful bloat also worsens with eating fatty foods too. Father required CCY for stones.   No relationship of this bloat to bowel movements. No other abd pain.   Can have some satiation. Endorses poor appetite. No unintentional weight loss.   Heartburn - started pepcid PRN instead of TUMs, finds it does help. Having nightly heartburn sensation now.   -she experiences a fullness to the back of her throat, usually at night and worse when she lays down to go to bed  -in the evening, finds that she needs to concentrate more while swallowing but denies similar troubles in the daytime. If she does not concentrate, she starts panicking and feels she is choking (this occurs with swallowing saliva only, not occurring with foods/pills/liquids)  -she doesn't think Pepcid helped with sensation of fullness / needing to concentrate more with swallowing   -she denies coughing/gagging/spitting up w/ a swallow  -after she swallows, she denies subjective sensation of dysphagia, or  odynphagia   -never had obstruction   Nausea occurs a few times weekly, no emesis   Pelvic Floor - never got a call to schedule an appt   No other questions or concerns today     Family Hx  Dad required CCY for gallstones   No other known family history or GI related malignancy (esophageal, gastric, pancreatic, liver or colon) or family history of IBD/celiac disease.     Social Hx   Occasional use of NSAIDs - once monthly    Occasional ETOH, once monthly  Never tobacco products   Does make her own edibles x regular use  nightly for 4 yr to help with insomnia   No other recreational drug use - also does have medical marijuana     PROBLEM LIST  Patient Active Problem List    Diagnosis Date Noted    Lupus miliaris disseminatus faciei 04/16/2025     Priority: Medium    Anti-cardiolipin antibody positive 10/04/2024     Priority: Medium     Pt with hx of anti-c antibody 2/10/2020 - done at [facility].       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 Surveillance to include a pelvic and rectal exam with provider visit.  No pap smear indicated.   -  tumor affected iliac vein on R side - s/p clips thought initially to be DVT       Elevated serum creatinine 02/19/2024     Priority: Medium    Chronic midline low back pain without sciatica 02/19/2024     Priority: Medium     Continue followed by chiropractor and Flexeril as needed.  Refilling today  - cyclobenzaprine 10mg      Fatigue, unspecified type 02/19/2024     Priority: Medium     Sleep study completed 5/20/2024 normal      History of breast augmentation 02/19/2024     Priority: Medium     Done in 2007 bilaterally.  The saline implants.  History of rupture and left side      Dizziness 11/20/2023     Priority: Medium    Migraine without aura 11/20/2023     Priority: Medium     7/26/24 - Headaches: triggers: lack of sleep, stress  HA - nausea, photo and phono-sensitive  Helps: qulipta 60mg PRN (from allergist - has samples), closing eyes, laying down, take a nap, ibuprofen doesn't help.  Zofran helpful for nausea.  Emgality for 1 month - still HA while on this 1/2024    - never started topamax - from [facility]       Tinnitus, left 11/20/2023     Priority: Medium    Functional urinary incontinence 10/26/2023     Priority: Medium    Major depressive disorder, single episode, moderate (H) 05/19/2023     Priority: Medium     Patient followed for all mental health conditions by psychiatrist.   [facility]      She feels she is stable on Zoloft 75 mg daily, Wellbutrin 150 mg extended release daily as well as Ativan as needed.      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 Surveillance to include a pelvic and rectal exam with provider visit.  No pap smear indicated.       No longer  on letrozole due to side effects  Cannot be on Estradiol replacement        IBS (irritable bowel syndrome) 06/01/2015     Priority: Medium    Generalized anxiety disorder 08/05/2011     Priority: Medium     [facility] and [facility]   Sertraline 75mg  And ativan PRN          PERTINENT PAST MEDICAL HISTORY:  Past Medical History:   Diagnosis Date    Arthritis 2020    Joint pain in fingers, ankles, back, wrists, shoukders, etc    Atypical squamous cells of undetermined significance (ASCUS) on Papanicolaou smear of cervix 10/23/2019    Formatting of this note might be different from the original.  02/04/2010 ASCUS/HPV Negative.    10/31/2012 ASCUS/HPV Negative    08/23/2019 ASCUS/HPV Negative   Plan: Pap/HPV due 8/2020    Atypical squamous cells of undetermined significance (ASCUS) on Papanicolaou smear of cervix 10/23/2019    02/04/2010 ASCUS/HPV Negative.     10/31/2012 ASCUS/HPV Negative     08/23/2019 ASCUS/HPV Negative      Pap/HPV due 8/2020   Cervix removed - monitored by oncology - every 6 months and CT every year       Cancer (H) 12/4/2019    Endometrial Stromal Sarcoma    Depressive disorder 5/2023    Major depressive elisode and anxiety    Hydronephrosis 3/26/2020    Lymphadenopathy 01/20/2020       PREVIOUS SURGERIES:  Past Surgical History:   Procedure Laterality Date    ABDOMEN SURGERY  2019 and 2029    Hysterectomy and tumour removal - BL oopherectomy    BREAST SURGERY  2007    Breast augmentation    COLONOSCOPY  2021    Polyps removed-repeat in 5 years    CYSTOSCOPY, SLING TRANSVAGINAL N/A 10/21/2024    Procedure: RETROPUBIC MIDURETHRAL SLING, CYSTOSCOPY;  Surgeon: Basia Tavares MD;  Location: [facility] OR    GENITOURINARY SURGERY  2001    Tibal ligation    HYSTERECTOMY VAGINAL, BILATERAL SALPINGO-OOPHERECTOMY, COMBINED      December 2019- and Feb 2020    HYSTERECTOMY, PAP NO LONGER INDICATED      at 45yo       ALLERGIES:     Allergies   Allergen Reactions     Ciprofloxacin Dizziness and Unknown     Dizziness       PERTINENT MEDICATIONS:    Current Outpatient Medications:     acetaminophen (TYLENOL) 500 MG tablet, Take 500-1,000 mg by mouth every 6 hours as needed for mild pain, Disp: , Rfl:     buPROPion (WELLBUTRIN XL) 300 MG 24 hr tablet, [facility], Disp: , Rfl:     cyclobenzaprine (FLEXERIL) 10 MG tablet, Take 1 tablet (10 mg) by mouth nightly as needed for muscle spasms, Disp: 30 tablet, Rfl: 3    dicyclomine (BENTYL) 10 MG capsule, Take 1 capsule (10 mg) by mouth 4 times daily (before meals and nightly)., Disp: 30 capsule, Rfl: 0    ibuprofen (ADVIL/MOTRIN) 200 MG tablet, Take 200 mg by mouth every 4 hours as needed for pain, Disp: , Rfl:     linaclotide (LINZESS) 72 MCG capsule, Take 1 capsule (72 mcg) by mouth every morning (before breakfast). (Patient not taking: Reported on 7/9/2025), Disp: 30 capsule, Rfl: 0    LORazepam (ATIVAN) 0.5 MG tablet, Take 0.5 mg by mouth as needed for anxiety [facility], Disp: , Rfl:     meclizine (ANTIVERT) 25 MG tablet, Take 1 tablet (25 mg) by mouth 3 times daily as needed for dizziness., Disp: 20 tablet, Rfl: 1    ondansetron (ZOFRAN) 4 MG tablet, Take 1 tablet (4 mg) by mouth every 8 hours as needed for nausea or vomiting., Disp: 30 tablet, Rfl: 4    rosuvastatin (CRESTOR) 40 MG tablet, Take 1 tablet (40 mg) by mouth daily., Disp: 90 tablet, Rfl: 3    sertraline (ZOLOFT) 50 MG tablet, Take 75 mg by mouth daily [facility], Disp: , Rfl:     SOCIAL HISTORY:  Social History     Socioeconomic History    Marital status:      Spouse name: Not on file    Number of children: Not on file    Years of education: Not on file    Highest education level: Not on file   Occupational History    Not on file   Tobacco Use    Smoking status: Never     Passive exposure: Past    Smokeless tobacco: Never   Vaping  Use    Vaping status: Never Used   Substance and Sexual Activity    Alcohol use: Yes     Comment: Occasional 2x month    Drug use: Never    Sexual activity: Yes     Partners: Male     Birth control/protection: Post-menopausal   Other Topics Concern    Parent/sibling w/ CABG, MI or angioplasty before 65F 55M? No   Social History Narrative    Not on file     Social Drivers of Health     Financial Resource Strain: Low Risk  (4/16/2025)    Financial Resource Strain     Within the past 12 months, have you or your family members you live with been unable to get utilities (heat, electricity) when it was really needed?: No   Food Insecurity: Low Risk  (4/16/2025)    Food Insecurity     Within the past 12 months, did you worry that your food would run out before you got money to buy more?: No     Within the past 12 months, did the food you bought just not last and you didn t have money to get more?: No   Transportation Needs: Low Risk  (4/16/2025)    Transportation Needs     Within the past 12 months, has lack of transportation kept you from medical appointments, getting your medicines, non-medical meetings or appointments, work, or from getting things that you need?: No   Physical Activity: Unknown (4/16/2025)    Exercise Vital Sign     Days of Exercise per Week: 4 days     Minutes of Exercise per Session: Not on file   Stress: Stress Concern Present (4/16/2025)    [employer] [place] [employer] - Occupational Stress Questionnaire     Feeling of Stress : Rather much   Social Connections: Unknown (4/16/2025)    Social Connection and Isolation Panel [NHANES]     Frequency of Communication with Friends and Family: Not on file     Frequency of Social Gatherings with Friends and Family: Once a week     Attends [employer] Services: Not on file     Active Member of Clubs or Organizations: Not on file     Attends Club or Organization Meetings: Not on file     Marital Status: Not on file   Interpersonal Safety: Low Risk   "(4/16/2025)    Interpersonal Safety     Do you feel physically and emotionally safe where you currently live?: Yes     Within the past 12 months, have you been hit, slapped, kicked or otherwise physically hurt by someone?: No     Within the past 12 months, have you been humiliated or emotionally abused in other ways by your partner or ex-partner?: No   Housing Stability: Low Risk  (4/16/2025)    Housing Stability     Do you have housing? : Yes     Are you worried about losing your housing?: No       FAMILY HISTORY:  Family History   Problem Relation Age of Onset    Hypertension Mother     Depression Mother     Obesity Mother     Heart Surgery Mother 70        PPM placement  - Martin in the 40s and then 130s?    Atrial fibrillation Mother     Retinal detachment Mother     Hypertension Father     Other Cancer Father         Basal cell on face and neck    Substance Abuse Father     Mental Illness Brother         Boderline personality disorder    Substance Abuse Brother     Obesity Brother     Anxiety Disorder Maternal Grandmother     Diabetes Maternal Grandfather     Cancer Paternal Grandfather         ?       Past/family/social history reviewed and no changes    PHYSICAL EXAMINATION:  Vitals reviewed: /82   Pulse 61   Ht 1.651 m (5' 5\")   Wt 81.8 kg (180 lb 6.4 oz)   LMP  (LMP Unknown)   SpO2 95%   BMI 30.02 kg/m    Constitutional: aaox3, cooperative, pleasant, not dyspneic/diaphoretic, no acute distress  Eyes: Sclera anicteric/injected  Ears/nose/mouth/throat: hearing intact  Neck: supple, active ROM w/o limitation or pain   CV: No edema  Respiratory: Unlabored breathing  Abd:  Nondistended, dullness to percussion throughout, mild discomfort to hypogastric, mid abdomen, no peritoneal signs, neg Sunshine's sign  Skin: warm, perfused, no jaundice  Psych: Normal affect  MSK: Normal gait     PERTINENT STUDIES:   Lab Results   Component Value Date    WBC 4.5 07/09/2025    WBC 4.1 10/04/2024    WBC 5.7 02/19/2024 "    HGB 13.8 07/09/2025    HGB 13.9 10/04/2024    HGB 14.6 02/19/2024     07/09/2025     10/04/2024     02/19/2024    CHOL 315 (H) 04/16/2025    TRIG 127 04/16/2025    TRIG 87 03/01/2021     04/16/2025    ALT 59 (H) 04/29/2025    ALT 58 (H) 02/19/2024    ALT 65 (H) 10/12/2023    AST 39 04/29/2025    AST 39 02/19/2024    AST 50 (H) 10/12/2023     04/29/2025     10/04/2024     02/19/2024    BUN 13.4 04/29/2025    BUN 15.0 10/04/2024    BUN 11.5 02/19/2024    CO2 28 04/29/2025    CO2 28 10/04/2024    CO2 28 02/19/2024    TSH 2.29 02/19/2024    INR 0.9 06/06/2022        PREVIOUS ENDOSCOPY    No results found for this or any previous visit.    Martin Christy M.D. - 07/23/2021  9:10 AM CDT   Formatting of this note might be different from the original.   [facility]   Patient Name: Orquidea Arevalo   Procedure Date: 7/23/2021 9:10 AM   MRN: [id number]   YOB: 1975   Age: 45   Gender: Female   Procedure:               Colonoscopy   Providers:               Courtney Rivera (Ordering                            Provider)   Referring Provider:      Courtney Lo   Pre-op Diagnoses:        Hematochezia   Post-op Diagnoses:        - One 2 mm polyp in the cecum, removed with a cold biopsy forceps.        Resected and retrieved.        - Two 3 to 4 mm polyps in the ascending colon, removed with a cold        snare. Resected and retrieved.        - Non-bleeding internal hemorrhoids.   Recommendation:       - Discharge patient to home.        - High fiber diet indefinitely.        - Use fiber, for example Citrucel, Fibercon, Konsyl or Metamucil.        - Await pathology results.        - Repeat colonoscopy in 5 years for surveillance.        - Return to primary care physician PRN.   Findings:       The perianal and digital rectal examinations were normal.        A 2 mm polyp was found in the cecum. The polyp was sessile. The polyp         was removed with a cold biopsy forceps. Resection and retrieval were        complete. Estimated blood loss was minimal.        Two sessile polyps were found in the ascending colon. The polyps were 3        to 4 mm in size. These polyps were removed with a cold snare. Resection        and retrieval were complete. Estimated blood loss was minimal.        Non-bleeding internal hemorrhoids were found during retroflexion. The        hemorrhoids were small and Grade I (internal hemorrhoids that do not        prolapse).   Medicines:       Propofol per Anesthesia   Estimated Blood Loss:    Estimated blood loss was minimal.   Complications:           No immediate complications. Estimated blood loss:                            Minimal.   Procedure Details:       The patient was seen, evaluated, and history                            reviewed. Airway and heart and lung exams were                            performed and were satisfactory for planned sedation                            care.                            The risks, benefits and alternatives for the                            procedure and sedation were discussed and informed                            consent was obtained. A procedural pause was                            conducted in the presence of assisting personnel to                            verify the correct patient identity and procedure to                            be performed. Throughout the procedure, the                            patient's blood pressure, pulse, and oxygen                            saturations were monitored continuously.                            The 01 [id number] was introduced under direct                            vision through the anus and advanced to the cecum,                            identified by appendiceal orifice and ileocecal                            valve. The colonoscopy was somewhat difficult due to                            a tortuous  "colon. Successful completion of the                            procedure was aided by using scope torsion. The                            patient tolerated the procedure well. The quality of                            the bowel preparation was evaluated using the BBPS                            ([place] Bowel Preparation Scale) with scores of:                            Right Colon = 3, Transverse Colon = 3 and Left Colon                            = 3 (entire mucosa seen well with no residual                            staining, small fragments of stool or opaque                            liquid). The total BBPS score equals 9.   Sedation:       Anesthesia was administered by an anesthesia professional. The following        parameters were monitored: oxygen saturation, heart rate, blood        pressure, respiratory rate, EKG, adequacy of pulmonary ventilation, and        response to care.   Martin Christy,   7/23/2021 9:54:34 AM   This report has been signed electronically.   Number of Addenda: 0   Note Initiated On: 7/23/2021 9:10 AM     Component 3 yr ago   Case Number [id number]   Report electronically signed by Sheba Oneil M.D.   Specimen Received A. Cecum polyp  B. Ascending colon polyps x 2   Clinical History Blood in stool   Gross Description A:   Received in a container labeled \"cecum polyp\" is a 0.3  cm in greatest dimension portion of tissue. All submitted  in cassette A1. B:  Received in a container labeled  \"ascending colon polyps x2\" are two tissues, 0.3 cm to 1.5  cm. All submitted in cassette B1.    [id number]   Interpretation FINAL DIAGNOSIS  A. Colon, cecum, polyp, biopsy:  Normal colonic mucosa.    B. Colon, ascending, polyps, biopsy:  Tubular adenoma and  sessile serrated adenoma.     "

## 2025-07-09 NOTE — PATIENT INSTRUCTIONS
It was a pleasure taking care of you today.  I've included a brief summary of our discussion and care plan from today's visit below.  Please review this information with your primary care provider.  _______________________________________________________________________    My recommendations are summarized as follows:    Pelvic Floor therapy evaluation [facility]   HIDA scan to check functioning of gallbladder   Start of daily omeprazole given the heartburn symptoms - take on empty stomach 30-60 min before first meal of the day   Let's have you perform a bowel clean out then resume daily magnesium 300 mg at night     Bowel cleanout  --  ONE Miralax bottle (238 g, 14 caps) and TWO 32 ounce Gatorade or other clear liquid (64 ounces). Mix the two. Allow to sit in fridge for 30-60 minutes to allow the Miralax to fully dissolve. Then drink 1 glass every 15-30 minutes over the next 3-4 hours. You should experience loose bowel movements that are watery in consistency when complete.  -- The day after the cleanout, immediately resume magnesium citrate (300 mg at night)    Return to GI Clinic in 10-12 wk to review your progress.     Please see below for any additional questions and scheduling guidelines.    Sign up for [employer]: [employer] patient portal serves as a secure platform for accessing your medical records from the [facility]. Additionally, [employer] facilitates easy, timely, and secure messaging with your care team. If you have not signed up, you may do so by using the provided code or calling [phone].    Coordinating your care after your visit:  There are multiple options for scheduling your follow-up care based on your provider's recommendation.    How do I schedule a follow-up clinic appointment:   After your appointment, you may receive scheduling assistance with the Clinic Coordinators by having a seat in the waiting room and a Clinic Coordinator will call you up to  schedule.  Virtual visits or after you leave the clinic:  Your provider has placed a follow-up order in the [facility] portal for scheduling your return appointment. A member of the scheduling team will contact you to schedule.  [facility] Scheduling: Timely scheduling through [employer] is advised to ensure appointment availability.   Call to schedule: You may schedule your follow-up appointment(s) by calling [phone], option 1.    How do I schedule my endoscopy or colonoscopy procedure:  If a procedure, such as a colonoscopy or upper endoscopy was ordered by your provider, the scheduling team will contact you to schedule this procedure. Or you may choose to call to schedule at   [phone], option 1.  Please allow 20-30 minutes when scheduling a procedure.    How do I get my blood work done? To get your blood work done, you need to schedule a lab appointment at an [facility]. There are multiple ways to schedule:   At the clinic: The Clinic Coordinator you meet after your visit can help you schedule a lab appointment.   [facility] scheduling: [facility] offers online lab scheduling at all [facility] laboratory locations.   Call to schedule: You can call [phone] to schedule your lab appointment.    How do I schedule my imaging study: To schedule imaging studies, such as CT scans, ultrasounds, MRIs, or X-rays, contact Imaging Services at [phone].    How do I schedule a referral to another doctor: If your provider recommended a referral to another specialist(s), the referral order was placed by your provider. You will receive a phone call to schedule this referral, or you may choose to call the number attached to the referral to self-schedule.    For Post-Visit Question(s):  For any inquiries following today's visit:  Please utilize [employer] messaging and allow 48 hours for reply or contact the Call Center during normal business hours at [phone], option 3.  For Emergent After-hours  questions, contact the On-Call GI Fellow through the [facility]  at [phone].  In addition, you may contact your Nurse directly using the provided contact information.    Test Results:  Test results will be accessible via [employer] in compliance with the 21st Century Cures Act. This means that your results will be available to you at the same time as your provider. Often you may see your results before your provider does. Results are reviewed by staff within two weeks with communication follow-up. Results may be released in the patient portal prior to your care team review.    Prescription Refill(s):  Medication prescribed by your provider will be addressed during your visit. For future refills, please coordinate with your pharmacy. If you have not had a recent clinic visit or routine labs, for your safety, your provider may not be able to refill your prescription.     Sincerely,    Lynda De Los Santos PA-C  Gastroenterology

## 2025-07-09 NOTE — LETTER
7/9/2025      Orquidea Arevalo  [address] [id number]      Dear Colleague,    Thank you for referring your patient, Orquidea Arevalo, to the [facility] [place]. Please see a copy of my visit note below.      GI CLINIC VISIT    ASSESSMENT/PLAN:  Orquidea Arevalo is a 49 year old year old female with PMHx of endometrial stromal sarcoma (s/p full hysterectomy w/ debulking monitored 6 months), chronic pain (w/ regular marijuana use x 4y), s/p bladder sling, high tone pelvic floor dysfunction, MDD following with the [facility] GI group for IBS-C and dyspepsia.     #IBS-C, per [facility]   #high tone pelvic floor dysfunction   10/2024 CTAP W contrast w/o evidence of intraluminal masses or bowel obstruction. Basic labs are normal including CBC, metabolic panel, and TSH. Celiac serologies were NEG with intact total IgA. Her colonoscopy is UTD - done 7/2021 to cecum with excellent prep. Two 3-4 mm polyps taken from ascending (TA, SSA) and findings of non-bleeding internal hemorrhoids on retroflexion. She previously followed with pelvic floor PT for high tone pelvic floor dysfunction.     Ddx for stooling pattern includes malabsorption, IMO vs other.    Plan  Now longer constipated but having irregular bowel patterns with varying consistencies and sensation of incomplete evacuation   -bowel clean out then lower magnesium to 300 mg at bedtime. If she has more constipation, OK to increase to 600 mg at bedtime. Linzess quoted at 500$ per mo.   -never heard from Pelvic Floor team, re-referral to [facility].   -continue good hydration and dietary fiber goal of 25-35g, gradually  -Cont PRN bentyl for cramping (not had to use). Reviewed SE, particularly constipation w/ regular use     Future consideration  1.Health psyD consult - may benefit from consultation   2. Discuss GI RD consult in future for overview of current diet - eating lower low fodmap foods. Unclear where she in  reintroduction   3. Alternate secretagogue  4. Breath test for IMO    #heartburn  #dyspepsia   #occasional nausea w/o emesis   CTAP 2025 with normal gallbladder, recent LFTs with mild elevation in ALT but not of alk phos. No dilation of biliary duct. Father required CCY for symptomatic gallstones. Bloat and dyspepetic sx worsen with fatty meals which she tries to avoid.  H. pylori stool antigen negative.     Sx could be due to GERD with ddx of functional disease, biliary disease. Retained stools are thought to be compounding these upper GI sx too    Plan  -bowel clean out then daily magnesium  citrate 300 mg   -daily omeprazole 40 mg x 90 d  -HIDA    Future consideration  -If heartburn, dyspeptic sx remain despite omeprazole trial, proceed with EGD w/ pH monitoring (so long as no nickel allergy)     #globus  She is describing globus sensation in the evening of dry swallows (not of swallows w/ food/pills/liquids). These sx are not present in the daytime and do not prevent her from eating/hydrating     Plan  -PPI trial per above    Future consideration  -ENT consult (for globus)    No orders of the defined types were placed in this encounter.    Colorectal cancer screenin2026, 3-4 mm TA and SSA    RTC - 10-12wk or sooner or PRN     Thank you for this consultation.  It was a pleasure to participate in the care of this patient; please contact me with any further questions.     Lynda De Los Santos PA-C    Follow up: As planned above. Today, I personally spent 40  minutes spent on the date of the encounter doing chart review, history and exam, documentation and further activities per the note.    HPI  Orquidea Arevalo is a 49 year old year old female with PMHx of endometrial stromal sarcoma (s/p full hysterectomy w/ debulking monitored 6 months), chronic pain (w/ regular marijuana use x 4y), s/p bladder sling, high tone pelvic floor dysfunction, MDD following with the [facility] GI group for IBS-C and dyspepsia.     Initial  appt with me 4/4/25  1. Shares dx with IBS in 2015 - had a lot of constipation, bloating, and abd pain    She is currently dealing with bloating, low back pain, pressure on bladder and concerned about urinary control (she had bladder sling 2024)    -feels bloated all the time, especially at night   -does have lower abd pain that improves/resolves w/ defecation   -tried low fodmap diet which she feels helped but doesn't resolve sx . Removing almonds has been helpful w/ cutting down gas   -shares did food allergy testing that was negative/unremarkable.     Does struggle with abd pain - usually lower abdomen  -she feels the stool going through and feels it delia   -does also have upper abd pain too   -usually with a BM, these pains go away   -she does endorse relationship of these pain to certain food triggers (onions, garlic),   -When pain is bad, she can't empty bladder as effectively    BM - constipation pattern, occasional diarrhea (with urgency)   Can go every 3-4d w/o a BM,however in last few weeks, passing 1 soft small  volume stool daily, can have a day of clustered BMs too  -endorses incomplete evacuation   -on toilet for few minutes but can stay for a longer time too   -does strain, tried toilet stool with equivocal results   -no bloody or black stools     Meds  Went on miralax but got diarrhea   Did not try magnesium yet   Primary had Rx'd linzess but it was too expensive. She meets deductible next week.     Saw pelvic health PT (bladder) who told her constipation will affect her urinary stream too. That provider rec'd toilet stool. Therapy occurred last summer     CScope 2021 with a few polyps with 5 year recall   TTG Ab - IgA NEG but no total IgA  No FHX of CRC, celiac dz    Weight / appetite - stable   Does eat gluten    2. Does have nausea but no vomiting     3. Does have heartburn troubles 2-3x a week   Taking 2 tums a few times a day . Not tried pepcid  No dysphagia , odynphagia or melena     4.  At night, she experiences a fullness to the back of her throat. With swallowing, it gets better.   -in the evening, finds that she needs to concentrate more while swallowing. Denies similar troubles in the daytime   -she denies coughing/gagging/spitting up w/ a swallow  -never had obstruction     Today July 9, 2025  At our last visit, celiac serologies (NEG) and h.pylori (NEG)  Referred to pelvic floor PT   Linzess 72 mcg cost prohibitive  Elevated GGT, mild elevated ALT and speckled FRED, to see hepatology next wk     Started with 2 magnesium gummy (600 mg, mag citrate)   -she is having some irregular bowel patterns, BM still feel incomplete movements, can be clustered, with a lot of urgency and varied consistency (skinny soft BM to pudding consistency; rarely will she see a nicely formed stool). No longer having constipation like before but will still go 1-2d w/o a BM  -will see light colored stools, to see hepatology next week to establish care   -no bloody or black stools   -stooling 4-5x a week  -BM frequency anywhere from 0-4 BMs  having a lot of bloating, especially at night; feels very full and can become painful (tightness, especially R side of abdomen), it's not painful but she feels it is very tight. This painful bloat also worsens with eating fatty foods too. Father required CCY for stones.   No relationship of this bloat to bowel movements. No other abd pain.   Can have some satiation. Endorses poor appetite. No unintentional weight loss.   Heartburn - started pepcid PRN instead of TUMs, finds it does help. Having nightly heartburn sensation now.   -she experiences a fullness to the back of her throat, usually at night and worse when she lays down to go to bed  -in the evening, finds that she needs to concentrate more while swallowing but denies similar troubles in the daytime. If she does not concentrate, she starts panicking and feels she is choking (this occurs with swallowing saliva only, not  occurring with foods/pills/liquids)  -she doesn't think Pepcid helped with sensation of fullness / needing to concentrate more with swallowing   -she denies coughing/gagging/spitting up w/ a swallow  -after she swallows, she denies subjective sensation of dysphagia, or odynphagia   -never had obstruction   Nausea occurs a few times weekly, no emesis   Pelvic Floor - never got a call to schedule an appt   No other questions or concerns today     Family Hx  Dad required CCY for gallstones   No other known family history or GI related malignancy (esophageal, gastric, pancreatic, liver or colon) or family history of IBD/celiac disease.     Social Hx   Occasional use of NSAIDs - once monthly    Occasional ETOH, once monthly  Never tobacco products   Does make her own edibles x regular use  nightly for 4 yr to help with insomnia   No other recreational drug use - also does have medical marijuana     PROBLEM LIST  Patient Active Problem List    Diagnosis Date Noted     Lupus miliaris disseminatus faciei 04/16/2025     Priority: Medium     Anti-cardiolipin antibody positive 10/04/2024     Priority: Medium     Pt with hx of anti-c antibody 2/10/2020 - done at [facility].        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 Surveillance to include a pelvic and rectal exam with provider visit.  No pap smear indicated.   - tumor affected iliac vein on R side - s/p clips thought initially to be DVT        Elevated serum creatinine 02/19/2024     Priority: Medium     Chronic midline low back pain without sciatica 02/19/2024     Priority: Medium     Continue followed by chiropractor and Flexeril as needed.  Refilling today  - cyclobenzaprine 10mg       Fatigue, unspecified type 02/19/2024     Priority: Medium     Sleep study completed 5/20/2024 normal       History of breast augmentation 02/19/2024     Priority: Medium     Done in 2007 bilaterally.  The saline implants.  History of rupture and left side       Dizziness 11/20/2023     Priority: Medium     Migraine without aura 11/20/2023     Priority: Medium     7/26/24 - Headaches: triggers: lack of sleep, stress  HA - nausea, photo and phono-sensitive  Helps: qulipta 60mg PRN (from allergist - has samples), closing eyes, laying down, take a nap, ibuprofen doesn't help.  Zofran helpful for nausea.  Emgality for 1 month - still HA while on this 1/2024    - never started topamax - [facility]        Tinnitus, left 11/20/2023     Priority: Medium     Functional urinary incontinence 10/26/2023     Priority: Medium     Major depressive disorder, single episode, moderate (H) 05/19/2023     Priority: Medium     Patient followed for all mental health conditions by psychiatrist.   [facility]      She feels she is stable on Zoloft 75 mg daily, Wellbutrin 150 mg extended release daily as well as Ativan as needed.       Blood in stool 03/17/2021     Priority: Medium     Endometrial stromal sarcoma (H) 01/13/2020     Priority: Medium     Check 1.24 for gyn SURV        Uterine leiomyoma 10/23/2019     Priority: Medium     Followed by oncology - Other Endometrial - Every 3 months for 2 years, then every 6 months for an additional 3 years, then annually. Surveillance to include a pelvic and rectal exam with provider visit.  No pap smear indicated.       No longer on letrozole due to side effects  Cannot be on Estradiol replacement         IBS (irritable bowel syndrome) 06/01/2015     Priority: Medium     Generalized anxiety disorder 08/05/2011     Priority: Medium     [facility] and [facility]   Sertraline 75mg  And ativan PRN          PERTINENT PAST MEDICAL HISTORY:  Past Medical History:   Diagnosis Date     Arthritis 2020    Joint pain in fingers, ankles, back, wrists, shoukders, etc     Atypical squamous cells of undetermined significance (ASCUS) on Papanicolaou smear of cervix 10/23/2019    Formatting of this note might be different from the original.  02/04/2010 ASCUS/HPV Negative.    10/31/2012 ASCUS/HPV Negative    08/23/2019 ASCUS/HPV Negative   Plan: Pap/HPV due 8/2020     Atypical squamous cells of undetermined significance (ASCUS) on Papanicolaou smear of cervix 10/23/2019    02/04/2010 ASCUS/HPV Negative.     10/31/2012 ASCUS/HPV Negative     08/23/2019 ASCUS/HPV Negative      Pap/HPV due 8/2020   Cervix removed - monitored by oncology - every 6 months and CT every year        Cancer (H) 12/4/2019    Endometrial Stromal Sarcoma     Depressive disorder 5/2023    Major depressive elisode and anxiety     Hydronephrosis 3/26/2020     Lymphadenopathy 01/20/2020       PREVIOUS SURGERIES:  Past Surgical History:   Procedure Laterality Date     ABDOMEN SURGERY  2019 and 2029    Hysterectomy and tumour removal - BL oopherectomy     BREAST SURGERY  2007    Breast augmentation     COLONOSCOPY  2021    Polyps removed-repeat in 5 years     CYSTOSCOPY, SLING TRANSVAGINAL N/A 10/21/2024    Procedure: RETROPUBIC MIDURETHRAL SLING, CYSTOSCOPY;  Surgeon: Anusha  Basia Claros MD;  [place] [facility] [place]     GENITOURINARY SURGERY  2001    Tibal ligation     HYSTERECTOMY VAGINAL, BILATERAL SALPINGO-OOPHERECTOMY, COMBINED      December 2019- and Feb 2020     HYSTERECTOMY, PAP NO LONGER INDICATED      at 43yo       ALLERGIES:     Allergies   Allergen Reactions     Ciprofloxacin Dizziness and Unknown     Dizziness       PERTINENT MEDICATIONS:    Current Outpatient Medications:      acetaminophen (TYLENOL) 500 MG tablet, Take 500-1,000 mg by mouth every 6 hours as needed for mild pain, Disp: , Rfl:      buPROPion (WELLBUTRIN XL) 300 MG 24 hr tablet, [facility], Disp: , Rfl:      cyclobenzaprine (FLEXERIL) 10 MG tablet, Take 1 tablet (10 mg) by mouth nightly as needed for muscle spasms, Disp: 30 tablet, Rfl: 3     dicyclomine (BENTYL) 10 MG capsule, Take 1 capsule (10 mg) by mouth 4 times daily (before meals and nightly)., Disp: 30 capsule, Rfl: 0     ibuprofen (ADVIL/MOTRIN) 200 MG tablet, Take 200 mg by mouth every 4 hours as needed for pain, Disp: , Rfl:      linaclotide (LINZESS) 72 MCG capsule, Take 1 capsule (72 mcg) by mouth every morning (before breakfast). (Patient not taking: Reported on 7/9/2025), Disp: 30 capsule, Rfl: 0     LORazepam (ATIVAN) 0.5 MG tablet, Take 0.5 mg by mouth as needed for anxiety [facility], Disp: , Rfl:      meclizine (ANTIVERT) 25 MG tablet, Take 1 tablet (25 mg) by mouth 3 times daily as needed for dizziness., Disp: 20 tablet, Rfl: 1     ondansetron (ZOFRAN) 4 MG tablet, Take 1 tablet (4 mg) by mouth every 8 hours as needed for nausea or vomiting., Disp: 30 tablet, Rfl: 4     rosuvastatin (CRESTOR) 40 MG tablet, Take 1 tablet (40 mg) by mouth daily., Disp: 90 tablet, Rfl: 3     sertraline (ZOLOFT) 50 MG tablet, Take 75 mg by mouth daily [facility], Disp: , Rfl:     SOCIAL HISTORY:  Social History     Socioeconomic  History     Marital status:      Spouse name: Not on file     Number of children: Not on file     Years of education: Not on file     Highest education level: Not on file   Occupational History     Not on file   Tobacco Use     Smoking status: Never     Passive exposure: Past     Smokeless tobacco: Never   Vaping Use     Vaping status: Never Used   Substance and Sexual Activity     Alcohol use: Yes     Comment: Occasional 2x month     Drug use: Never     Sexual activity: Yes     Partners: Male     Birth control/protection: Post-menopausal   Other Topics Concern     Parent/sibling w/ CABG, MI or angioplasty before 65F 55M? No   Social History Narrative     Not on file     Social Drivers of Health     Financial Resource Strain: Low Risk  (4/16/2025)    Financial Resource Strain      Within the past 12 months, have you or your family members you live with been unable to get utilities (heat, electricity) when it was really needed?: No   Food Insecurity: Low Risk  (4/16/2025)    Food Insecurity      Within the past 12 months, did you worry that your food would run out before you got money to buy more?: No      Within the past 12 months, did the food you bought just not last and you didn t have money to get more?: No   Transportation Needs: Low Risk  (4/16/2025)    Transportation Needs      Within the past 12 months, has lack of transportation kept you from medical appointments, getting your medicines, non-medical meetings or appointments, work, or from getting things that you need?: No   Physical Activity: Unknown (4/16/2025)    Exercise Vital Sign      Days of Exercise per Week: 4 days      Minutes of Exercise per Session: Not on file   Stress: Stress Concern Present (4/16/2025)    [place] [employer] - Occupational Stress Questionnaire      Feeling of Stress : Rather much   Social Connections: Unknown (4/16/2025)    Social Connection and Isolation Panel [NHANES]      Frequency of  "Communication with Friends and Family: Not on file      Frequency of Social Gatherings with Friends and Family: Once a week      Attends Muslim Services: Not on file      Active Member of Clubs or Organizations: Not on file      Attends Club or Organization Meetings: Not on file      Marital Status: Not on file   Interpersonal Safety: Low Risk  (4/16/2025)    Interpersonal Safety      Do you feel physically and emotionally safe where you currently live?: Yes      Within the past 12 months, have you been hit, slapped, kicked or otherwise physically hurt by someone?: No      Within the past 12 months, have you been humiliated or emotionally abused in other ways by your partner or ex-partner?: No   Housing Stability: Low Risk  (4/16/2025)    Housing Stability      Do you have housing? : Yes      Are you worried about losing your housing?: No       FAMILY HISTORY:  Family History   Problem Relation Age of Onset     Hypertension Mother      Depression Mother      Obesity Mother      Heart Surgery Mother 70        PPM placement  - Martin in the 40s and then 130s?     Atrial fibrillation Mother      Retinal detachment Mother      Hypertension Father      Other Cancer Father         Basal cell on face and neck     Substance Abuse Father      Mental Illness Brother         Boderline personality disorder     Substance Abuse Brother      Obesity Brother      Anxiety Disorder Maternal Grandmother      Diabetes Maternal Grandfather      Cancer Paternal Grandfather         ?       Past/family/social history reviewed and no changes    PHYSICAL EXAMINATION:  Vitals reviewed: /82   Pulse 61   Ht 1.651 m (5' 5\")   Wt 81.8 kg (180 lb 6.4 oz)   LMP  (LMP Unknown)   SpO2 95%   BMI 30.02 kg/m    Constitutional: aaox3, cooperative, pleasant, not dyspneic/diaphoretic, no acute distress  Eyes: Sclera anicteric/injected  Ears/nose/mouth/throat: hearing intact  Neck: supple, active ROM w/o limitation or pain   CV: No " edema  Respiratory: Unlabored breathing  Abd:  Nondistended, dullness to percussion throughout, mild discomfort to hypogastric, mid abdomen, no peritoneal signs, neg Sunshine's sign  Skin: warm, perfused, no jaundice  Psych: Normal affect  MSK: Normal gait     PERTINENT STUDIES:   Lab Results   Component Value Date    WBC 4.5 07/09/2025    WBC 4.1 10/04/2024    WBC 5.7 02/19/2024    HGB 13.8 07/09/2025    HGB 13.9 10/04/2024    HGB 14.6 02/19/2024     07/09/2025     10/04/2024     02/19/2024    CHOL 315 (H) 04/16/2025    TRIG 127 04/16/2025    TRIG 87 03/01/2021     04/16/2025    ALT 59 (H) 04/29/2025    ALT 58 (H) 02/19/2024    ALT 65 (H) 10/12/2023    AST 39 04/29/2025    AST 39 02/19/2024    AST 50 (H) 10/12/2023     04/29/2025     10/04/2024     02/19/2024    BUN 13.4 04/29/2025    BUN 15.0 10/04/2024    BUN 11.5 02/19/2024    CO2 28 04/29/2025    CO2 28 10/04/2024    CO2 28 02/19/2024    TSH 2.29 02/19/2024    INR 0.9 06/06/2022        PREVIOUS ENDOSCOPY    No results found for this or any previous visit.    Martin Christy M.D. - 07/23/2021  9:10 AM CDT   Formatting of this note might be different from the original.   [facility]   GI   Patient Name: Orquidea Arevalo   Procedure Date: 7/23/2021 9:10 AM   MRN: [id number]   YOB: 1975   Age: 45   Gender: Female   Procedure:               Colonoscopy   Providers:               Courtney Rivera (Ordering                            Provider)   Referring Provider:      Courtney Lo   Pre-op Diagnoses:        Hematochezia   Post-op Diagnoses:        - One 2 mm polyp in the cecum, removed with a cold biopsy forceps.        Resected and retrieved.        - Two 3 to 4 mm polyps in the ascending colon, removed with a cold        snare. Resected and retrieved.        - Non-bleeding internal hemorrhoids.   Recommendation:       - Discharge patient to home.        - High fiber  diet indefinitely.        - Use fiber, for example Citrucel, Fibercon, Konsyl or Metamucil.        - Await pathology results.        - Repeat colonoscopy in 5 years for surveillance.        - Return to primary care physician PRN.   Findings:       The perianal and digital rectal examinations were normal.        A 2 mm polyp was found in the cecum. The polyp was sessile. The polyp        was removed with a cold biopsy forceps. Resection and retrieval were        complete. Estimated blood loss was minimal.        Two sessile polyps were found in the ascending colon. The polyps were 3        to 4 mm in size. These polyps were removed with a cold snare. Resection        and retrieval were complete. Estimated blood loss was minimal.        Non-bleeding internal hemorrhoids were found during retroflexion. The        hemorrhoids were small and Grade I (internal hemorrhoids that do not        prolapse).   Medicines:       Propofol per Anesthesia   Estimated Blood Loss:    Estimated blood loss was minimal.   Complications:           No immediate complications. Estimated blood loss:                            Minimal.   Procedure Details:       The patient was seen, evaluated, and history                            reviewed. Airway and heart and lung exams were                            performed and were satisfactory for planned sedation                            care.                            The risks, benefits and alternatives for the                            procedure and sedation were discussed and informed                            consent was obtained. A procedural pause was                            conducted in the presence of assisting personnel to                            verify the correct patient identity and procedure to                            be performed. Throughout the procedure, the                            patient's blood pressure, pulse, and oxygen                            saturations  "were monitored continuously.                            The 01 [id number] was introduced under direct                            vision through the anus and advanced to the cecum,                            identified by appendiceal orifice and ileocecal                            valve. The colonoscopy was somewhat difficult due to                            a tortuous colon. Successful completion of the                            procedure was aided by using scope torsion. The                            patient tolerated the procedure well. The quality of                            the bowel preparation was evaluated using the BBPS                            ([place] Bowel Preparation Scale) with scores of:                            Right Colon = 3, Transverse Colon = 3 and Left Colon                            = 3 (entire mucosa seen well with no residual                            staining, small fragments of stool or opaque                            liquid). The total BBPS score equals 9.   Sedation:       Anesthesia was administered by an anesthesia professional. The following        parameters were monitored: oxygen saturation, heart rate, blood        pressure, respiratory rate, EKG, adequacy of pulmonary ventilation, and        response to care.   Martin Christy,   7/23/2021 9:54:34 AM   This report has been signed electronically.   Number of Addenda: 0   Note Initiated On: 7/23/2021 9:10 AM     Component 3 yr ago   Case Number [id number]   Report electronically signed by Sheba Oneil M.D.   Specimen Received A. Cecum polyp  B. Ascending colon polyps x 2   Clinical History Blood in stool   Gross Description A:   Received in a container labeled \"cecum polyp\" is a 0.3  cm in greatest dimension portion of tissue. All submitted  in cassette A1. B:  Received in a container labeled  \"ascending colon polyps x2\" are two tissues, 0.3 cm to 1.5  cm. All submitted in cassette B1.    [id number] "   Interpretation FINAL DIAGNOSIS  A. Colon, cecum, polyp, biopsy:  Normal colonic mucosa.    B. Colon, ascending, polyps, biopsy:  Tubular adenoma and  sessile serrated adenoma.       Again, thank you for allowing me to participate in the care of your patient.        Sincerely,        Lynda De Los Santos PA-C    Electronically signed

## 2025-07-14 ENCOUNTER — HOSPITAL ENCOUNTER (OUTPATIENT)
Dept: NUCLEAR MEDICINE | Facility: HOSPITAL | Age: 50
Discharge: HOME | End: 2025-07-14
Attending: PHYSICIAN ASSISTANT | Admitting: RADIOLOGY
Payer: COMMERCIAL

## 2025-07-14 ENCOUNTER — PRE VISIT (OUTPATIENT)
Dept: GASTROENTEROLOGY | Facility: CLINIC | Age: 50
End: 2025-07-14
Payer: COMMERCIAL

## 2025-07-14 ENCOUNTER — DOCUMENTATION ONLY (OUTPATIENT)
Dept: GASTROENTEROLOGY | Facility: CLINIC | Age: 50
End: 2025-07-14
Payer: COMMERCIAL

## 2025-07-14 DIAGNOSIS — R10.13 DYSPEPSIA: ICD-10-CM

## 2025-07-14 PROCEDURE — 250N000011 HC RX IP 250 OP 636: Performed by: PHYSICIAN ASSISTANT

## 2025-07-14 PROCEDURE — 343N000001 HC RX 343 MED OP 636: Performed by: PHYSICIAN ASSISTANT

## 2025-07-14 PROCEDURE — 78227 HEPATOBIL SYST IMAGE W/DRUG: CPT

## 2025-07-14 PROCEDURE — A9537 TC99M MEBROFENIN: HCPCS | Performed by: PHYSICIAN ASSISTANT

## 2025-07-14 RX ORDER — KIT FOR THE PREPARATION OF TECHNETIUM TC 99M MEBROFENIN 45 MG/10ML
5-12 INJECTION, POWDER, LYOPHILIZED, FOR SOLUTION INTRAVENOUS ONCE
Status: COMPLETED | OUTPATIENT
Start: 2025-07-14 | End: 2025-07-14

## 2025-07-14 RX ADMIN — MEBROFENIN 8.5 MILLICURIE: 45 INJECTION, POWDER, LYOPHILIZED, FOR SOLUTION INTRAVENOUS at 12:50

## 2025-07-14 RX ADMIN — SINCALIDE 1.6 MCG: 5 INJECTION, POWDER, LYOPHILIZED, FOR SOLUTION INTRAVENOUS at 13:58

## 2025-07-17 ENCOUNTER — PATIENT OUTREACH (OUTPATIENT)
Dept: CARE COORDINATION | Facility: CLINIC | Age: 50
End: 2025-07-17
Payer: COMMERCIAL

## 2025-07-22 ENCOUNTER — LAB (OUTPATIENT)
Dept: LAB | Facility: CLINIC | Age: 50
End: 2025-07-22
Payer: COMMERCIAL

## 2025-07-22 ENCOUNTER — OFFICE VISIT (OUTPATIENT)
Dept: AUDIOLOGY | Facility: CLINIC | Age: 50
End: 2025-07-22
Payer: COMMERCIAL

## 2025-07-22 DIAGNOSIS — H93.13 TINNITUS, BILATERAL: ICD-10-CM

## 2025-07-22 DIAGNOSIS — H93.12 TINNITUS OF LEFT EAR: Primary | ICD-10-CM

## 2025-07-22 DIAGNOSIS — R74.01 ELEVATED ALT MEASUREMENT: ICD-10-CM

## 2025-07-22 DIAGNOSIS — R42 DIZZINESS: ICD-10-CM

## 2025-07-22 LAB
CK SERPL-CCNC: 170 U/L (ref 26–192)
CRP SERPL-MCNC: <3 MG/L
ERYTHROCYTE [SEDIMENTATION RATE] IN BLOOD BY WESTERGREN METHOD: 14 MM/HR (ref 0–30)
FERRITIN SERPL-MCNC: 291 NG/ML (ref 6–175)
IRON BINDING CAPACITY (ROCHE): 247 UG/DL (ref 240–430)
IRON SATN MFR SERPL: 32 % (ref 15–46)
IRON SERPL-MCNC: 78 UG/DL (ref 37–145)
TSH SERPL DL<=0.005 MIU/L-ACNC: 1.5 UIU/ML (ref 0.3–4.2)

## 2025-07-22 PROCEDURE — 82103 ALPHA-1-ANTITRYPSIN TOTAL: CPT | Performed by: PHYSICIAN ASSISTANT

## 2025-07-22 PROCEDURE — 86160 COMPLEMENT ANTIGEN: CPT | Performed by: PHYSICIAN ASSISTANT

## 2025-07-22 PROCEDURE — 85652 RBC SED RATE AUTOMATED: CPT | Performed by: PATHOLOGY

## 2025-07-22 PROCEDURE — 82550 ASSAY OF CK (CPK): CPT | Performed by: PATHOLOGY

## 2025-07-22 PROCEDURE — 36415 COLL VENOUS BLD VENIPUNCTURE: CPT | Performed by: PATHOLOGY

## 2025-07-22 PROCEDURE — 86140 C-REACTIVE PROTEIN: CPT | Performed by: PATHOLOGY

## 2025-07-22 PROCEDURE — 83540 ASSAY OF IRON: CPT | Performed by: PATHOLOGY

## 2025-07-22 PROCEDURE — 83550 IRON BINDING TEST: CPT | Performed by: PATHOLOGY

## 2025-07-22 PROCEDURE — 99000 SPECIMEN HANDLING OFFICE-LAB: CPT | Performed by: PATHOLOGY

## 2025-07-22 PROCEDURE — 84443 ASSAY THYROID STIM HORMONE: CPT | Performed by: PATHOLOGY

## 2025-07-22 PROCEDURE — 86258 DGP ANTIBODY EACH IG CLASS: CPT | Performed by: PHYSICIAN ASSISTANT

## 2025-07-22 PROCEDURE — 86225 DNA ANTIBODY NATIVE: CPT | Performed by: PHYSICIAN ASSISTANT

## 2025-07-22 PROCEDURE — 82728 ASSAY OF FERRITIN: CPT | Performed by: PATHOLOGY

## 2025-07-22 NOTE — PROGRESS NOTES
AUDIOLOGY REPORT    SUMMARY: Audiology visit completed. See audiogram for results.      RECOMMENDATIONS: Follow-up with ENT.      I was present with the patient for the entire audiology appointment including all procedures/testing performed by the AuD student, and agree with the student's assessment and plan as documented.       Val Shaffer, CCC-A  Clinical Audiologist  [place] #[id number]

## 2025-07-22 NOTE — Clinical Note
Hi Elena- VNG and rotary chair testing completed yesterday showed no peripheral indications. There were significant central findings, including disconjugate eye movement, abnormal pursuit and abnormal caloric fixation. Please see my note for details. Looks like she is not currently scheduled for any follow up with ENT.  Thanks, Brandon

## 2025-07-22 NOTE — PROGRESS NOTES
"AUDIOLOGY REPORT-BALANCE ASSESSMENT    SUBJECTIVE: Orquidea Arevalo, 49 year old, was seen in Audiology at the [facility] on 7/22/2025, for videonystagmography (VNG) and rotational chair testing, referred by Elena Landaverde M.D.  The patient reports onset of ringing in the left ear approximately 5 years ago.  She reports onset of dizziness was in January 2023 for which she was seen in the emergency room.  She reports being diagnosed with peripheral vertigo and she has continued to have episodes of dizziness and imbalance since that time.  She reports having significant stress at the time including being laid off from work.  She describes the dizziness as a sensation of rocking or floating.  Episodes will last for minutes and occur daily, sometimes multiple times per day.  They can be triggered by busy visual environments, looking up, quick head turns, or bright lights.  She also feels a constant sense of imbalance and reports having to focus on the ground more when walking.  This is worse in the darkness.  Sitting still helps her feel better.  She occasionally takes meclizine when she is feeling more severe symptoms, and Zofran which helps with nausea.  She also reports episodes which she describes as a \"zap\" that can feel shocking or disorienting. These are momentary.  She can have multiple episodes per day and they happen most frequently when she is laying down in bed.  They can also happen with head turns.  She also reports random episodes of muscle twitching in her face (nose and eyes).  She denies any falls.  She reports a history of motion sickness that has worsened with age.  She denies dizziness triggered by loud sounds, pressure changes or exertion.  She reports history of anxiety and depression which she feels is well-managed at this time with talk therapy and medication.  She reports doing a course of physical therapy at [facility] previously.  She " does note some improvement in symptoms with the physical therapy, and would like to try somewhere closer to her home.    She reports seeing a neurologist previously who thought her symptoms may be caused by vestibular migraine.  She has tried oral migraine medication and injections to help with symptoms.  She did see some improvement with the medication.  She reports visual symptoms of blurred vision which can happen when she is reading or trying to focus on something.  She reports she can easily lose her place or skip words while reading.  She also reports occasional double vision.  She reports history of Lasik eye surgery in 2007.  She reports a history of endometrial cancer with surgery to remove the tumor.  She denies history of chemotherapy or radiation.  Family history is positive for hearing loss in her father (likely related to noise exposure), and for vertigo in her grandmother (unknown cause).  Family history is negative for migraines.    Most recent hearing evaluation completed today revealed normal hearing bilaterally with a 20 dB air-bone gap noted at 4000 Hz in the right ear.  The patient reports sensitivity to high-pitched sounds, constant left tinnitus, occasional bilateral aural fullness and popping in the ears.  Occasional bilateral otalgia.  She denies history of ear surgeries and noise exposure.  Orquidea has not taken any antivestibular medications in the past 48 hours. She does report taking allergy medication (Zyrtec), and one cup of coffee this morning, which is her typical daily consumption.  Discussed the possibility of this affecting test results, patient would like to proceed with testing.    OBJECTIVE:  Falls Risk Screening  Falls Risk Completed by: Audiology  Have you fallen 2 or more times in the past year? No  Have you fallen and had an injury in the past year? No  Is the patient receiving Physical Therapy services? No  Fall Screen Comments:     Dizziness Handicap Inventory (DHI):  50/100; moderate perceived impairment    Rotational chair testing:   Sinusoidal harmonic acceleration test:  Spontaneous nystagmus: Absent  Phase: Normal at 0.01, 0.02, 0.08, and 0.32 Hz  Gain: Normal at 0.01, 0.02, 0.08, and 0.32 Hz  Symmetry: Normal at 0.01, 0.02, 0.08, and 0.32 Hz  Spectral Purity: Normal at 0.01, 0.02, 0.08, and 0.32 Hz  Overall rotational chair test: Normal at 0.01, 0.02, 0.08, and 0.32 Hz    Videonystagmography (VNG) testing:  Prescreening:  Tympanograms: normal eardrum mobility bilaterally (performed during hearing evaluation prior to VNG). Note: this test is completed to determine the status of the middle ear before irrigations are completed.  Ocular range of motion and ocular counter roll: Normal  Cross/cover: Normal  Head Thrust: Negative   Note: During calibration, a disconjugate eye movement was noted where the left eye moved inward.  The patient reports this was noted at a previous ophthalmology exam and she will be following up with neurology for this.    Nystagmus Tests:  Gaze-Horizontal with fixation:    Center: No nystagmus. Intermittent periods of disconjugate eye movement (left eye moving inward)    Right: No nystagmus   Left: No nystagmus  Gaze-Vertical with fixation:   Up: No nystagmus. Intermittent periods of disconjugate eye movement (left eye moving inward)    Down: No nystagmus  Gaze with fixation denied: Intermittent disconjugate eye movement noted throughout (left eye moves inward)   Center: No nystagmus   Right: No nystagmus   Left: No nystagmus   Up: No nystagmus  High Frequency Headshake:   Horizontal: Negative; no nystagmus.  Patient reports feeling spacey post head shake   Vertical: Negative; no nystagmus.  Patient reports slight spacey feeling post head shake    [place]-Hallpike Head Right: Negative for PC-BPPV. No nystagmus.  Patient reported head pressure supine, and spacey feeling throughout.  [place]-Hallpike Head Left: Negative for PC-BPPV. No nystagmus.  No symptoms  "supine, rocking sensation upon sitting.  Roll Test Head Right: Negative for HC-BPPV. No nystagmus or symptoms   Roll Test Head Left: Negative for HC-BPPV. No nystagmus or symptoms     Positional Testing: No consistent nystagmus.  Convergence spasms noted intermittently throughout all positions (left eye more than right).  Patient reported seeing \"flicker\" of white light intermittently throughout, which did not seem to correlate with convergence.  Positionals: Supine: See notes above  Positionals: Body Right: See notes above  Positionals: Body Left: See notes above  Positionals: Pre-Caloric: See notes above    Oculomotor Tests:  Saccades: Normal  Anti-saccades: Patient had some difficulty initially, but able to perform task consistently on repeat   Pursuit: Abnormal: Saccadic pursuit.  No improvement on repeat    Calorics :  (Tested at 44 degrees and 30 degrees Celsius for 30 seconds for warm and cool water, respectively):  Right Warm Eye Speed: 40 degrees per second right beating  Left Warm Eye Speed: 39 degrees per second left beating.  Abnormal fixation noted.  Right Cool Eye Speed: 23 degrees per second left beating.  Abnormal fixation noted.  Left Cool Eye Speed: 18 degrees per second right beating  Difference between ear: 5% left hypofunction. (Greater than 25% considered clinically significant.)  Fixation Index: Abnormal; for both left beating conditions  Overall caloric test: Normal    Post-Calorics Otoscopy: Normal    ASSESSMENT:  1. Indications of central vestibular system involvement noted on today's exam were as follows:   - Disconjugate eye movement noted intermittently throughout testing with and without fixation (left eye moving inward and convergence spasms with vision denied)  - Abnormal Pursuit testing; repeatable  - Abnormal Caloric fixation for both left beating conditions    2. There were no significant indications of peripheral vestibular system involvement noted on today's exam. "     PLAN:  Follow-up with Elena Landaverde M.D. regarding today's results and for medical management.  Please call this clinic at [phone] with questions regarding these results or recommendations.       Marc Wayne.  Licensed Audiologist  [place] # [id number]

## 2025-07-23 ENCOUNTER — OFFICE VISIT (OUTPATIENT)
Dept: SURGERY | Facility: CLINIC | Age: 50
End: 2025-07-23
Attending: PHYSICIAN ASSISTANT
Payer: COMMERCIAL

## 2025-07-23 VITALS — BODY MASS INDEX: 30.04 KG/M2 | WEIGHT: 180.5 LBS

## 2025-07-23 DIAGNOSIS — K80.50 BILIARY COLIC SYMPTOM: Primary | ICD-10-CM

## 2025-07-23 DIAGNOSIS — R10.13 DYSPEPSIA: ICD-10-CM

## 2025-07-23 LAB
C3 SERPL-MCNC: 110 MG/DL (ref 81–157)
C4 SERPL-MCNC: 27 MG/DL (ref 13–39)
DSDNA AB SER-ACNC: 0.8 IU/ML
GLIADIN IGA SER-ACNC: 0.6 U/ML
GLIADIN IGG SER-ACNC: 0.7 U/ML

## 2025-07-23 PROCEDURE — 99204 OFFICE O/P NEW MOD 45 MIN: CPT | Performed by: SURGERY

## 2025-07-23 NOTE — PROGRESS NOTES
HPI:  Orquidea Arevalo is a 49 year old female who was referred to me by Sheba aJne for evaluation of biliary hyperkinesia.  Patient has a long history of abdominal pain.  She has been diagnosed with irritable bowel syndrome and struggles with intermittent cramping, diarrhea, and constipation.  She was recently seen for longstanding mild LFT elevations.  In the course of this workup she described right sided abdominal pain that is potentially food related that lasts usually several hours and then subsides.  A HIDA scan was performed that showed an ejection fraction of 89%.  The patient also notes that when she was given the dose of CCK for the scan, her symptoms were reproduced.    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  She is currently disease-free and does not currently undergoing any specific treatments, simply surveillance.    Allergies:Ciprofloxacin    Past Medical History:   Diagnosis Date    Arthritis 2020    Joint pain in fingers, ankles, back, wrists, shoukders, etc    Atypical squamous cells of undetermined significance (ASCUS) on Papanicolaou smear of cervix 10/23/2019    Formatting of this note might be different from the original.  02/04/2010 ASCUS/HPV Negative.    10/31/2012 ASCUS/HPV Negative    08/23/2019 ASCUS/HPV Negative   Plan: Pap/HPV due 8/2020    Atypical squamous cells of undetermined significance (ASCUS) on Papanicolaou smear of cervix 10/23/2019    02/04/2010 ASCUS/HPV Negative.     10/31/2012 ASCUS/HPV Negative     08/23/2019 ASCUS/HPV Negative      Pap/HPV due 8/2020   Cervix removed - monitored by oncology - every 6 months and CT every year       Cancer (H) 12/4/2019    Endometrial Stromal Sarcoma    Depressive disorder 5/2023    Major depressive elisode and anxiety    Hydronephrosis 3/26/2020    Lymphadenopathy 01/20/2020       Past Surgical History:   Procedure Laterality Date     ABDOMEN SURGERY  2019 and 2029    Hysterectomy and tumour removal - BL oopherectomy    BREAST SURGERY  2007    Breast augmentation    COLONOSCOPY  2021    Polyps removed-repeat in 5 years    CYSTOSCOPY  6-17-22    Due to large ampunts of blood in urine    CYSTOSCOPY, SLING TRANSVAGINAL N/A 10/21/2024    Procedure: RETROPUBIC MIDURETHRAL SLING, CYSTOSCOPY;  Surgeon: Basia Tavares MD;  Location: [facility] OR    GENITOURINARY SURGERY  2001    Tibal ligation    HYSTERECTOMY VAGINAL, BILATERAL SALPINGO-OOPHERECTOMY, COMBINED      December 2019- and Feb 2020    HYSTERECTOMY, PAP NO LONGER INDICATED      at 43yo       Current Outpatient Medications   Medication Sig Dispense Refill    acetaminophen (TYLENOL) 500 MG tablet Take 500-1,000 mg by mouth every 6 hours as needed for mild pain      APPLE CIDER VINEGAR PO Take 1,000 mg by mouth daily. gummies, also contains B-12      buPROPion (WELLBUTRIN XL) 300 MG 24 hr tablet [facility]      cyclobenzaprine (FLEXERIL) 10 MG tablet Take 1 tablet (10 mg) by mouth nightly as needed for muscle spasms 30 tablet 3    LORazepam (ATIVAN) 0.5 MG tablet Take 0.5 mg by mouth as needed for anxiety [facility]      MAGNESIUM CITRATE PO Take 300 mg by mouth daily.      meclizine (ANTIVERT) 25 MG tablet Take 1 tablet (25 mg) by mouth 3 times daily as needed for dizziness. 20 tablet 1    omeprazole (PRILOSEC) 40 MG DR capsule Take 1 capsule (40 mg) by mouth daily. 90 capsule 0    ondansetron (ZOFRAN) 4 MG tablet Take 1 tablet (4 mg) by mouth every 8 hours as needed for nausea or vomiting. 30 tablet 4    rosuvastatin (CRESTOR) 40 MG tablet Take 1 tablet (40 mg) by mouth daily. 90 tablet 3    sertraline (ZOLOFT) 50 MG tablet Take 75 mg by mouth daily [facility]      dicyclomine (BENTYL) 10 MG capsule Take 1 capsule (10 mg) by mouth 4 times daily (before meals and  nightly). (Patient not taking: Reported on 7/23/2025) 30 capsule 0    linaclotide (LINZESS) 72 MCG capsule Take 1 capsule (72 mcg) by mouth every morning (before breakfast). (Patient not taking: Reported on 7/23/2025) 30 capsule 0       Family History   Problem Relation Age of Onset    Hypertension Mother     Depression Mother     Obesity Mother     Heart Surgery Mother 70        PPM placement  - Martin in the 40s and then 130s?    Atrial fibrillation Mother     Retinal detachment Mother     Hypertension Father     Other Cancer Father         Basal cell on face and neck    Substance Abuse Father     Arthritis Father     Kidney Disease Father     Hearing Loss Father     Mental Illness Brother         Boderline personality disorder    Substance Abuse Brother     Obesity Brother     Anxiety Disorder Maternal Grandmother     Heart Disease Maternal Grandmother     Diabetes Maternal Grandfather     Cancer Paternal Grandfather         ?    Kidney Disease Paternal Grandmother     Heart Disease Other         Aunt        reports that she has never smoked. She has been exposed to tobacco smoke. She has never used smokeless tobacco. She reports current alcohol use. She reports that she does not use drugs.      Wt 81.9 kg (180 lb 8 oz)   LMP  (LMP Unknown)   BMI 30.04 kg/m    Body mass index is 30.04 kg/m .    EXAM:  GENERAL: Well developed female in NAD  HEENT: Pupils are round and reactive, sclera are anicteric.   NECK:  No obvious masses or deformities  CV: RRR  PULM: Non-labored breathing on RA  NEURO: No obvious deficits noted.  ABD: Soft and nondistended.  Well-healed lower midline incision.  Mild tenderness on the right side and epigastrium.  EXT: No edema, no obvious deformities or any other abnormalities    LABS:   WBC Count   Date Value Ref Range Status   07/09/2025 4.5 4.0 - 11.0 10e3/uL Final      Liver Function Studies -   Recent Labs   Lab Test 07/09/25  0826   PROTTOTAL 7.1   ALBUMIN 4.3   BILITOTAL 0.4   ALKPHOS  "82   AST 40   ALT 41       IMAGES:   CT reviewed.  Fairly normal-appearing gallbladder on that image.  HIDA scan shows a an ejection fraction of 89%.    Assessment/Plan:    Orquidea Arevalo is a 49 year old female presenting with intermittent right-sided abdominal pain that is food related and with an elevated ejection fraction on recent HIDA scan.  The symptoms are consistent with biliary hyperkinesia.  Literature would suggest that patients with this constellation of symptoms is an elevated ejection fraction can benefit from cholecystectomy.       The details of a laparoscopic cholecystectomy were then reviewed including a discussion of the risks of bleeding, infection, injury to bile ducts and other structures, bile leak, and retained stone.  Additionally emphasized that for this patient, perhaps her biggest \"risk\" is that this may be a nontherapeutic intervention and not improve her symptoms.  She understands this and is willing to proceed despite this possibility.  The patient consents to proceed and we will work on scheduling.      Martin Acosta MD  General Surgeon  [facility]  Surgery [address]  [address] [place] [id number]?  Office: [phone]    "

## 2025-07-23 NOTE — LETTER
7/23/2025      Orquidea Arevalo  [address] [id number]      Dear Colleague,    Thank you for referring your patient, Orquidea Arevalo, to the [facility] AND [facility]RICS [facility] [place]. Please see a copy of my visit note below.    HPI:  Orquidea Arevalo is a 49 year old female who was referred to me by Sheba Jane for evaluation of biliary hyperkinesia.  Patient has a long history of abdominal pain.  She has been diagnosed with irritable bowel syndrome and struggles with intermittent cramping, diarrhea, and constipation.  She was recently seen for longstanding mild LFT elevations.  In the course of this workup she described right sided abdominal pain that is potentially food related that lasts usually several hours and then subsides.  A HIDA scan was performed that showed an ejection fraction of 89%.  The patient also notes that when she was given the dose of CCK for the scan, her symptoms were reproduced.    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  She is currently disease-free and does not currently undergoing any specific treatments, simply surveillance.    Allergies:Ciprofloxacin    Past Medical History:   Diagnosis Date     Arthritis 2020    Joint pain in fingers, ankles, back, wrists, shoukders, etc     Atypical squamous cells of undetermined significance (ASCUS) on Papanicolaou smear of cervix 10/23/2019    Formatting of this note might be different from the original.  02/04/2010 ASCUS/HPV Negative.    10/31/2012 ASCUS/HPV Negative    08/23/2019 ASCUS/HPV Negative   Plan: Pap/HPV due 8/2020     Atypical squamous cells of undetermined significance (ASCUS) on Papanicolaou smear of cervix 10/23/2019    02/04/2010 ASCUS/HPV Negative.     10/31/2012 ASCUS/HPV Negative     08/23/2019 ASCUS/HPV Negative      Pap/HPV due 8/2020   Cervix removed - monitored by oncology - every 6  months and CT every year        Cancer (H) 12/4/2019    Endometrial Stromal Sarcoma     Depressive disorder 5/2023    Major depressive elisode and anxiety     Hydronephrosis 3/26/2020     Lymphadenopathy 01/20/2020       Past Surgical History:   Procedure Laterality Date     ABDOMEN SURGERY  2019 and 2029    Hysterectomy and tumour removal - BL oopherectomy     BREAST SURGERY  2007    Breast augmentation     COLONOSCOPY  2021    Polyps removed-repeat in 5 years     CYSTOSCOPY  6-17-22    Due to large ampunts of blood in urine     CYSTOSCOPY, SLING TRANSVAGINAL N/A 10/21/2024    Procedure: RETROPUBIC MIDURETHRAL SLING, CYSTOSCOPY;  Surgeon: Basia Tavares MD;  Location: [facility] OR     GENITOURINARY SURGERY  2001    Tibal ligation     HYSTERECTOMY VAGINAL, BILATERAL SALPINGO-OOPHERECTOMY, COMBINED      December 2019- and Feb 2020     HYSTERECTOMY, PAP NO LONGER INDICATED      at 43yo       Current Outpatient Medications   Medication Sig Dispense Refill     acetaminophen (TYLENOL) 500 MG tablet Take 500-1,000 mg by mouth every 6 hours as needed for mild pain       APPLE CIDER VINEGAR PO Take 1,000 mg by mouth daily. gummies, also contains B-12       buPROPion (WELLBUTRIN XL) 300 MG 24 hr tablet [facility]       cyclobenzaprine (FLEXERIL) 10 MG tablet Take 1 tablet (10 mg) by mouth nightly as needed for muscle spasms 30 tablet 3     LORazepam (ATIVAN) 0.5 MG tablet Take 0.5 mg by mouth as needed for anxiety [facility]       MAGNESIUM CITRATE PO Take 300 mg by mouth daily.       meclizine (ANTIVERT) 25 MG tablet Take 1 tablet (25 mg) by mouth 3 times daily as needed for dizziness. 20 tablet 1     omeprazole (PRILOSEC) 40 MG DR capsule Take 1 capsule (40 mg) by mouth daily. 90 capsule 0     ondansetron (ZOFRAN) 4 MG tablet Take 1 tablet (4 mg) by mouth every 8 hours as needed for nausea or vomiting. 30 tablet 4     rosuvastatin  (CRESTOR) 40 MG tablet Take 1 tablet (40 mg) by mouth daily. 90 tablet 3     sertraline (ZOLOFT) 50 MG tablet Take 75 mg by mouth daily [facility]       dicyclomine (BENTYL) 10 MG capsule Take 1 capsule (10 mg) by mouth 4 times daily (before meals and nightly). (Patient not taking: Reported on 7/23/2025) 30 capsule 0     linaclotide (LINZESS) 72 MCG capsule Take 1 capsule (72 mcg) by mouth every morning (before breakfast). (Patient not taking: Reported on 7/23/2025) 30 capsule 0       Family History   Problem Relation Age of Onset     Hypertension Mother      Depression Mother      Obesity Mother      Heart Surgery Mother 70        PPM placement  - Martin in the 40s and then 130s?     Atrial fibrillation Mother      Retinal detachment Mother      Hypertension Father      Other Cancer Father         Basal cell on face and neck     Substance Abuse Father      Arthritis Father      Kidney Disease Father      Hearing Loss Father      Mental Illness Brother         Boderline personality disorder     Substance Abuse Brother      Obesity Brother      Anxiety Disorder Maternal Grandmother      Heart Disease Maternal Grandmother      Diabetes Maternal Grandfather      Cancer Paternal Grandfather         ?     Kidney Disease Paternal Grandmother      Heart Disease Other         Aunt        reports that she has never smoked. She has been exposed to tobacco smoke. She has never used smokeless tobacco. She reports current alcohol use. She reports that she does not use drugs.      Wt 81.9 kg (180 lb 8 oz)   LMP  (LMP Unknown)   BMI 30.04 kg/m    Body mass index is 30.04 kg/m .    EXAM:  GENERAL: Well developed female in NAD  HEENT: Pupils are round and reactive, sclera are anicteric.   NECK:  No obvious masses or deformities  CV: RRR  PULM: Non-labored breathing on RA  NEURO: No obvious deficits noted.  ABD: Soft and nondistended.  Well-healed lower midline incision.  Mild tenderness  "on the right side and epigastrium.  EXT: No edema, no obvious deformities or any other abnormalities    LABS:   WBC Count   Date Value Ref Range Status   07/09/2025 4.5 4.0 - 11.0 10e3/uL Final      Liver Function Studies -   Recent Labs   Lab Test 07/09/25  0826   PROTTOTAL 7.1   ALBUMIN 4.3   BILITOTAL 0.4   ALKPHOS 82   AST 40   ALT 41       IMAGES:   CT reviewed.  Fairly normal-appearing gallbladder on that image.  HIDA scan shows a an ejection fraction of 89%.    Assessment/Plan:    Orquidea Arevalo is a 49 year old female presenting with intermittent right-sided abdominal pain that is food related and with an elevated ejection fraction on recent HIDA scan.  The symptoms are consistent with biliary hyperkinesia.  Literature would suggest that patients with this constellation of symptoms is an elevated ejection fraction can benefit from cholecystectomy.       The details of a laparoscopic cholecystectomy were then reviewed including a discussion of the risks of bleeding, infection, injury to bile ducts and other structures, bile leak, and retained stone.  Additionally emphasized that for this patient, perhaps her biggest \"risk\" is that this may be a nontherapeutic intervention and not improve her symptoms.  She understands this and is willing to proceed despite this possibility.  The patient consents to proceed and we will work on scheduling.      Martin Acosta MD  General Surgeon  [facility]  Surgery [address] [address]?  Office: [phone]      Again, thank you for allowing me to participate in the care of your patient.        Sincerely,        Martin Acsota MD    Electronically signed"

## 2025-07-24 ENCOUNTER — TELEPHONE (OUTPATIENT)
Dept: SURGERY | Facility: CLINIC | Age: 50
End: 2025-07-24
Payer: COMMERCIAL

## 2025-07-24 ENCOUNTER — HOSPITAL ENCOUNTER (OUTPATIENT)
Facility: AMBULATORY SURGERY CENTER | Age: 50
End: 2025-07-24
Attending: SURGERY
Payer: COMMERCIAL

## 2025-07-24 DIAGNOSIS — K80.50 BILIARY COLIC SYMPTOM: ICD-10-CM

## 2025-07-24 DIAGNOSIS — R42 DIZZINESS: Primary | ICD-10-CM

## 2025-07-24 DIAGNOSIS — G89.18 ACUTE POST-OPERATIVE PAIN: Primary | ICD-10-CM

## 2025-07-24 LAB — A1AT SERPL-MCNC: 107 MG/DL (ref 90–200)

## 2025-07-24 NOTE — LETTER
Pre-op Physical: Schedule a pre-op physical with your primary care doctor. The pre-op physical must be 10-30 days before surgery and since it is required by anesthesia, your surgery will be cancelled if it's not done.      Surgery Date: 8/5/2025     Location: [place] [address]    Approximate Arrival Time: 7:30 am  (Unless instructed differently by the pre-op call nurse)     Post op Appointment: a nurse will contact you 5-7 days following surgery    Pre-Surgical Tasks:     Review all medications with your primary care or prescribing physician; they will advise you which meds to stop and when, and when you can resume taking.  Certain medications like blood thinners and weight loss medications need to be stopped in advance of surgery to proceed safely.      Blood thinners including but not exclusive to drugs like Xarelto, Eliquis, Warfarin and Aspirin, should be stopped five days before surgery, if your prescribing provider agrees. Follow your provider's advice on stopping blood thinners because they know you best.  If you are unsure if your medication is a blood thinner, ask your prescribing provider.    Weight loss medications: There are multiple medications being used for weight management and diabetes today, and the list is growing.  Phentermine, Ozempic, Wegovy, Trulicity, and other similar medications need to be stopped one week before surgery to avoid being cancelled.  Victoza and Saxenda can be continued longer but must be stopped one full day before surgery.  Please ask your prescribing provider for advice.    Diabetic medications: in addition to the medications talked about above that are used for either weight loss or diabetes, some people are on insulin that may require adjustment.  Please discuss managing diabetic medications with your prescribing doctor as these medications may require modification prior to surgery.     Please shower the  evening before and morning of surgery with Hibiclens soap.  This can be found at your local pharmacy.     Fasting instructions will be provided by the pre-op nurse who will call you 1-3 days before surgery.  Typically, we advise normal food up to 8 hours before you arrive for surgery. Clear liquids only from then until 2 hours before you arrive surgery, then nothing at all by mouth.  The nurse will review your specific instructions with you at the call.      Smoking impacts your body's ability to heal properly so we advise patients to quit if possible before surgery.  Plastic Surgery patients are required to be nicotine free for at least 8 weeks before surgery.      You will need an adult to drive you home and stay with you 24 hours after surgery. Public transportation or Medical Van Services are not permitted.    Visitor restrictions are subject to change, please verify with the pre-op nurse when they call how many people are permitted to accompany you.    We always encourage you to notify your insurance any time you have medical tests or procedures scheduled including surgery. The number is usually right on the back of your insurance card. To obtain pricing for surgery, please call  [employer] [facility] at [phone] or email [email or web address].        Call our office if you have any questions! Thank you!       Elizabeth Al MA  [facility]   (General Surgery/ ENT/ Plastics)  Direct Office: [phone]      Electronically signed

## 2025-07-24 NOTE — TELEPHONE ENCOUNTER
Spoke with patient today to schedule surgery as ordered by the provider.    WC/MVA Related?: No    Went over details/instructions as written on the letter.    Pre Op By: H&P by Primary MD  Post Op Scheduled : Not applicable    Medications:  Blood Thinners? No  Weight Loss Meds? No  Diabetes Meds? No    Surgery Letter sent via [employer]      (Please see LETTERS TAB in chart to retrieve a copy of this letter)

## 2025-08-04 ENCOUNTER — OFFICE VISIT (OUTPATIENT)
Dept: NEUROLOGY | Facility: CLINIC | Age: 50
End: 2025-08-04
Attending: NURSE PRACTITIONER
Payer: COMMERCIAL

## 2025-08-04 VITALS — DIASTOLIC BLOOD PRESSURE: 76 MMHG | HEART RATE: 58 BPM | SYSTOLIC BLOOD PRESSURE: 115 MMHG

## 2025-08-04 DIAGNOSIS — H93.12 TINNITUS, LEFT: ICD-10-CM

## 2025-08-04 DIAGNOSIS — R11.0 NAUSEA: ICD-10-CM

## 2025-08-04 DIAGNOSIS — H53.143 PHOTOPHOBIA OF BOTH EYES: ICD-10-CM

## 2025-08-04 DIAGNOSIS — R09.89 JUGULAR VENOUS DISTENSION: Primary | ICD-10-CM

## 2025-08-04 DIAGNOSIS — R42 VERTIGO: ICD-10-CM

## 2025-08-04 PROCEDURE — 3078F DIAST BP <80 MM HG: CPT | Performed by: PSYCHIATRY & NEUROLOGY

## 2025-08-04 PROCEDURE — 99417 PROLNG OP E/M EACH 15 MIN: CPT | Performed by: PSYCHIATRY & NEUROLOGY

## 2025-08-04 PROCEDURE — 3074F SYST BP LT 130 MM HG: CPT | Performed by: PSYCHIATRY & NEUROLOGY

## 2025-08-04 PROCEDURE — 99205 OFFICE O/P NEW HI 60 MIN: CPT | Performed by: PSYCHIATRY & NEUROLOGY

## 2025-08-04 PROCEDURE — G2211 COMPLEX E/M VISIT ADD ON: HCPCS | Performed by: PSYCHIATRY & NEUROLOGY

## 2025-08-05 ENCOUNTER — DOCUMENTATION ONLY (OUTPATIENT)
Dept: OTHER | Facility: CLINIC | Age: 50
End: 2025-08-05

## 2025-08-05 DIAGNOSIS — R09.89 JUGULAR VENOUS DISTENSION: Primary | ICD-10-CM

## 2025-08-05 PROCEDURE — 47562 LAPAROSCOPIC CHOLECYSTECTOMY: CPT | Performed by: SURGERY

## 2025-08-05 RX ORDER — DIAZEPAM 5 MG/1
5 TABLET ORAL
Qty: 1 TABLET | Refills: 0 | Status: SHIPPED | OUTPATIENT
Start: 2025-08-05

## 2025-08-05 RX ORDER — OXYCODONE HYDROCHLORIDE 5 MG/1
2.5-5 TABLET ORAL EVERY 6 HOURS PRN
Qty: 6 TABLET | Refills: 0 | Status: SHIPPED | OUTPATIENT
Start: 2025-08-05 | End: 2025-08-08

## 2025-08-11 ENCOUNTER — MYC MEDICAL ADVICE (OUTPATIENT)
Dept: SURGERY | Facility: CLINIC | Age: 50
End: 2025-08-11
Payer: COMMERCIAL

## 2025-08-20 ENCOUNTER — PRE VISIT (OUTPATIENT)
Dept: OTOLARYNGOLOGY | Facility: CLINIC | Age: 50
End: 2025-08-20

## (undated) DEVICE — SUCTION MANIFOLD NEPTUNE 2 SYS 4 PORT 0702-020-000

## (undated) DEVICE — SU DERMABOND ADVANCED .7ML DNX12

## (undated) DEVICE — GLOVE BIOGEL PI MICRO SZ 6.5 48565

## (undated) DEVICE — DRAPE GYN/UROLOGY FLUID POUCH TUR 29455

## (undated) DEVICE — CATH FOLEY 16FR 5ML SILASTIC COATED 33616 LATEX

## (undated) DEVICE — RETR RING LINA SEASTAR SQUARE 7X7" 4000-S-Q

## (undated) DEVICE — PACK MINOR CUSTOM ASC

## (undated) DEVICE — BAG URINARY DRAIN 4000ML LF 153509

## (undated) DEVICE — SU VICRYL+ 3-0 27IN SH UND VCP416H

## (undated) DEVICE — RETR ELASTIC STAYS LONE STAR SHARP 5MM 8/PACK 3311-8G

## (undated) DEVICE — SOL NACL 0.9% IRRIG 3000ML BAG 2B7477

## (undated) DEVICE — LINEN TOWEL PACK X5 5464

## (undated) DEVICE — SOL NACL 0.9% IRRIG 500ML BOTTLE 2F7123

## (undated) DEVICE — SUCTION MANIFOLD NEPTUNE 2 SYS 1 PORT 702-025-000

## (undated) DEVICE — TUBING IRRIG CYSTO/BLADDER SET 81" LF 2C4040

## (undated) DEVICE — SU MONOCRYL 4-0 PS-2 27" UND Y426H

## (undated) DEVICE — SOL NACL 0.9% INJ 1000ML BAG 2B1324X

## (undated) DEVICE — DRSG TELFA ISLAND 4X8" 7541

## (undated) DEVICE — NDL 18GA 1.5" 305196

## (undated) DEVICE — PREP DYNA-HEX 4% CHG SCRUB 4OZ BOTTLE MDS098710

## (undated) DEVICE — TUBING SUCTION 12"X1/4" N612

## (undated) RX ORDER — CEPHALEXIN 500 MG/1
CAPSULE ORAL
Status: DISPENSED
Start: 2024-04-25

## (undated) RX ORDER — ONDANSETRON 2 MG/ML
INJECTION INTRAMUSCULAR; INTRAVENOUS
Status: DISPENSED
Start: 2024-10-21

## (undated) RX ORDER — ACETAMINOPHEN 325 MG/1
TABLET ORAL
Status: DISPENSED
Start: 2024-10-21

## (undated) RX ORDER — LIDOCAINE HYDROCHLORIDE 20 MG/ML
JELLY TOPICAL
Status: DISPENSED
Start: 2024-10-21

## (undated) RX ORDER — SULFAMETHOXAZOLE AND TRIMETHOPRIM 800; 160 MG/1; MG/1
TABLET ORAL
Status: DISPENSED
Start: 2024-10-25

## (undated) RX ORDER — LIDOCAINE HYDROCHLORIDE 20 MG/ML
JELLY TOPICAL
Status: DISPENSED
Start: 2024-04-25

## (undated) RX ORDER — SINCALIDE 5 UG/5ML
INJECTION, POWDER, LYOPHILIZED, FOR SOLUTION INTRAVENOUS
Status: DISPENSED
Start: 2025-07-14

## (undated) RX ORDER — OXYCODONE HYDROCHLORIDE 5 MG/1
TABLET ORAL
Status: DISPENSED
Start: 2024-10-21

## (undated) RX ORDER — PROPOFOL 10 MG/ML
INJECTION, EMULSION INTRAVENOUS
Status: DISPENSED
Start: 2024-10-21

## (undated) RX ORDER — CEFAZOLIN SODIUM 2 G/50ML
SOLUTION INTRAVENOUS
Status: DISPENSED
Start: 2024-10-21

## (undated) RX ORDER — FENTANYL CITRATE 50 UG/ML
INJECTION, SOLUTION INTRAMUSCULAR; INTRAVENOUS
Status: DISPENSED
Start: 2024-10-21

## (undated) RX ORDER — WATER 10 ML/10ML
INJECTION INTRAMUSCULAR; INTRAVENOUS; SUBCUTANEOUS
Status: DISPENSED
Start: 2025-07-14